# Patient Record
Sex: FEMALE | Race: BLACK OR AFRICAN AMERICAN | NOT HISPANIC OR LATINO | Employment: OTHER | URBAN - METROPOLITAN AREA
[De-identification: names, ages, dates, MRNs, and addresses within clinical notes are randomized per-mention and may not be internally consistent; named-entity substitution may affect disease eponyms.]

---

## 2017-11-13 DIAGNOSIS — R94.5 LIVER FUNCTION STUDY, ABNORMAL: Primary | ICD-10-CM

## 2017-11-13 DIAGNOSIS — E78.5 HYPERLIPIDEMIA: ICD-10-CM

## 2017-11-13 DIAGNOSIS — E11.9 DM (DIABETES MELLITUS): ICD-10-CM

## 2017-11-21 ENCOUNTER — HOSPITAL ENCOUNTER (OUTPATIENT)
Dept: RADIOLOGY | Facility: OTHER | Age: 52
Discharge: HOME | End: 2017-11-21
Attending: INTERNAL MEDICINE
Payer: COMMERCIAL

## 2017-11-21 DIAGNOSIS — E78.5 HYPERLIPIDEMIA: ICD-10-CM

## 2017-11-21 DIAGNOSIS — R94.5 LIVER FUNCTION STUDY, ABNORMAL: ICD-10-CM

## 2017-11-21 DIAGNOSIS — E11.9 DM (DIABETES MELLITUS): ICD-10-CM

## 2017-11-21 PROCEDURE — 76700 US EXAM ABDOM COMPLETE: CPT | Mod: 26,,, | Performed by: RADIOLOGY

## 2017-11-21 PROCEDURE — 76700 US EXAM ABDOM COMPLETE: CPT | Mod: TC

## 2017-12-16 ENCOUNTER — OFFICE VISIT (OUTPATIENT)
Dept: URGENT CARE | Facility: CLINIC | Age: 52
End: 2017-12-16
Payer: COMMERCIAL

## 2017-12-16 VITALS
DIASTOLIC BLOOD PRESSURE: 86 MMHG | SYSTOLIC BLOOD PRESSURE: 137 MMHG | RESPIRATION RATE: 16 BRPM | TEMPERATURE: 98 F | WEIGHT: 144 LBS | HEART RATE: 88 BPM | OXYGEN SATURATION: 98 % | BODY MASS INDEX: 21.33 KG/M2 | HEIGHT: 69 IN

## 2017-12-16 DIAGNOSIS — R51.9 ACUTE NONINTRACTABLE HEADACHE, UNSPECIFIED HEADACHE TYPE: Primary | ICD-10-CM

## 2017-12-16 DIAGNOSIS — J02.9 PHARYNGITIS, UNSPECIFIED ETIOLOGY: ICD-10-CM

## 2017-12-16 DIAGNOSIS — H57.89 REDNESS OF EYE, RIGHT: ICD-10-CM

## 2017-12-16 PROCEDURE — 96372 THER/PROPH/DIAG INJ SC/IM: CPT | Mod: S$GLB,,, | Performed by: EMERGENCY MEDICINE

## 2017-12-16 PROCEDURE — 99203 OFFICE O/P NEW LOW 30 MIN: CPT | Mod: 25,S$GLB,, | Performed by: EMERGENCY MEDICINE

## 2017-12-16 RX ORDER — KETOROLAC TROMETHAMINE 30 MG/ML
30 INJECTION, SOLUTION INTRAMUSCULAR; INTRAVENOUS
Status: COMPLETED | OUTPATIENT
Start: 2017-12-16 | End: 2017-12-16

## 2017-12-16 RX ORDER — ATORVASTATIN CALCIUM 40 MG/1
40 TABLET, FILM COATED ORAL DAILY
COMMUNITY
End: 2019-10-31 | Stop reason: CLARIF

## 2017-12-16 RX ORDER — NORETHINDRONE ACETATE AND ETHINYL ESTRADIOL 1.5-30(21)
1 KIT ORAL DAILY
COMMUNITY
End: 2022-04-07

## 2017-12-16 RX ORDER — METFORMIN HYDROCHLORIDE 500 MG/1
500 TABLET ORAL DAILY
COMMUNITY
End: 2021-02-17 | Stop reason: ALTCHOICE

## 2017-12-16 RX ORDER — BUTALBITAL, ACETAMINOPHEN AND CAFFEINE 50; 325; 40 MG/1; MG/1; MG/1
1 TABLET ORAL EVERY 4 HOURS PRN
COMMUNITY
End: 2019-10-31 | Stop reason: CLARIF

## 2017-12-16 RX ORDER — BUTALBITAL, ACETAMINOPHEN AND CAFFEINE 50; 325; 40 MG/1; MG/1; MG/1
1 TABLET ORAL EVERY 4 HOURS PRN
Qty: 20 TABLET | Refills: 0 | Status: SHIPPED | OUTPATIENT
Start: 2017-12-16 | End: 2018-01-15

## 2017-12-16 RX ORDER — CEFDINIR 300 MG/1
300 CAPSULE ORAL EVERY 12 HOURS
Qty: 14 CAPSULE | Refills: 0 | Status: SHIPPED | OUTPATIENT
Start: 2017-12-16 | End: 2017-12-23

## 2017-12-16 RX ORDER — CIPROFLOXACIN HYDROCHLORIDE 3 MG/ML
2 SOLUTION/ DROPS OPHTHALMIC EVERY 4 HOURS
Qty: 10 ML | Refills: 0 | Status: SHIPPED | OUTPATIENT
Start: 2017-12-16 | End: 2017-12-23

## 2017-12-16 RX ADMIN — KETOROLAC TROMETHAMINE 30 MG: 30 INJECTION, SOLUTION INTRAMUSCULAR; INTRAVENOUS at 04:12

## 2017-12-16 NOTE — PROGRESS NOTES
Subjective:       Patient ID: Teresa Trujillo is a 52 y.o. female.    Vitals:    12/16/17 1541   BP: 137/86   Pulse: 88   Resp: 16   Temp: 98.2 °F (36.8 °C)       Chief Complaint: Eye Problem    Pt states right eye redness and irritation and headache x 2 days. Reports sore throat, sinus congestion, head congestion and headache, as well as right sided eye redness, with light sensitivity (that does resolve with the alcaine drops). No hx of glaucoma, no hx of migraines, does occasionally wear contact but none recently.      Eye Problem    The right eye is affected. This is a new problem. The current episode started yesterday. The problem occurs constantly. The problem has been gradually worsening. The patient is experiencing no pain. Associated symptoms include an eye discharge (clear drainage), eye redness and photophobia. Pertinent negatives include no blurred vision, double vision, fever, nausea or vomiting. She has tried eye drops for the symptoms. The treatment provided mild relief.     Review of Systems   Constitution: Negative for chills and fever.   HENT: Positive for congestion, odynophagia and sore throat.    Eyes: Positive for discharge (clear drainage), photophobia, redness and visual disturbance (mild blurriness from the drainage and lightsensitivity. able to fully open after alcaiune with resolution of her synmptoms). Negative for blurred vision, double vision, pain and vision loss in right eye.   Musculoskeletal: Negative for joint pain, joint swelling, myalgias and neck pain.   Gastrointestinal: Negative for abdominal pain, nausea and vomiting.   Genitourinary: Negative for dysuria and hematuria.   Neurological: Positive for headaches. Negative for brief paralysis, difficulty with concentration, dizziness, focal weakness, loss of balance, numbness and sensory change.   Psychiatric/Behavioral: Negative for altered mental status.       Objective:      Physical Exam   Constitutional: She is oriented to  person, place, and time. She appears well-developed and well-nourished. No distress.   HENT:   Head: Normocephalic and atraumatic.   Right Ear: Hearing, external ear and ear canal normal. No drainage or tenderness. Tympanic membrane is not injected, not erythematous, not retracted and not bulging. No middle ear effusion.   Left Ear: Hearing, external ear and ear canal normal. No drainage or tenderness. Tympanic membrane is not injected, not erythematous, not retracted and not bulging.  No middle ear effusion.   Nose: Nose normal. No mucosal edema or rhinorrhea. Right sinus exhibits no maxillary sinus tenderness and no frontal sinus tenderness. Left sinus exhibits no maxillary sinus tenderness and no frontal sinus tenderness.   Mouth/Throat: Mucous membranes are normal. No dental abscesses or uvula swelling. No oropharyngeal exudate, posterior oropharyngeal edema or posterior oropharyngeal erythema. No tonsillar exudate.   Posterior op erythema  Sinus ttp of the maxillary sinuses bilaterally and the right frontal sinuses  No pain on palpation of the globe, periorbital rim nor periorbital region  Fluorescein exam shows no foreign bodies, shows no corneal abrasion  See MDM    Eyes: Conjunctivae and EOM are normal. Pupils are equal, round, and reactive to light. Right eye exhibits no discharge. Left eye exhibits no discharge.   Cardiovascular: Normal rate and regular rhythm.  Exam reveals no gallop and no friction rub.    No murmur heard.  Pulmonary/Chest: Breath sounds normal. No respiratory distress. She has no wheezes. She has no rales. She exhibits no tenderness.   Abdominal: Bowel sounds are normal. There is no tenderness.   Lymphadenopathy:        Head (right side): No submental adenopathy present.        Head (left side): No submental adenopathy present.     She has no cervical adenopathy.        Right cervical: No superficial cervical and no posterior cervical adenopathy present.       Left cervical: No  superficial cervical and no posterior cervical adenopathy present.   Neurological: She is alert and oriented to person, place, and time.   Skin: Skin is warm and dry. No rash noted.   Nursing note and vitals reviewed.      With painful red eye. With symptoms of pharyngitis, sinus infection and resolution of symptoms with alcaine drops, lack of corneal abrasion and fb, ddx includes viral vs bacterial conjunctivity, but also includes increased intra-ocular pressures. She does not have a cloudy cornea,eomi, and visual acuity normal and intact without deficits after alcain drops placed, leading towards superficial corneal/conjunctival irritation rather than increased iop or acute angle closure glaucome, but explained to the patientm her daughter, and her  that if she has worse headache, return of eye pain or worse eye pain, or weakness in the arms or legs, change or loss of vision or failure to improve in 24-36 hours that she would need to go direclt yot he er for slit lmp and specifically iop mneasurments. She understands this and voiced this to me and will see her eye doctor on Monday.  Assessment:       1. Acute nonintractable headache, unspecified headache type    2. Redness of eye, right    3. Pharyngitis, unspecified etiology        Plan:       Teresa was seen today for eye problem.    Diagnoses and all orders for this visit:    Acute nonintractable headache, unspecified headache type    Redness of eye, right    Pharyngitis, unspecified etiology    Other orders  -     ketorolac injection 30 mg; Inject 30 mg into the muscle one time.  -     butalbital-acetaminophen-caffeine -40 mg (FIORICET, ESGIC) -40 mg per tablet; Take 1 tablet by mouth every 4 (four) hours as needed for Pain.  -     ciprofloxacin HCl (CILOXAN) 0.3 % ophthalmic solution; Place 2 drops into the right eye every 4 (four) hours.  -     cefdinir (OMNICEF) 300 MG capsule; Take 1 capsule (300 mg total) by mouth every 12 (twelve)  "hours.          Patient Instructions   AS DISCUSSED IN LENGTH THE ONLY WAS TO RULE OUT ACUTE GLAUCOME IS TO HAVE INTRA-OCULAR PRESSURES READ. WE ARE UNABLE TO DO THIS IN THE CLINIC AT THIS TIME, AND THIS WOULD BE RULE OUT AND DONE IN THE EMERGENCY ROOM OR OPHTHALMOLOGY OFFICE.    IF NOT MUCH IMPROVED AND CERTAINLY IF WORSE DESPITE TREATMENT, PLEASE GO DIRECTLY TO THE EMERGENCY ROOM FOR FURTHER WORKUP AND TREATMENT AND RULING OUT GLAUCOMA. IF YOU HAVE WORSE HEADACHE, LOSS OF VISION, WORSE EYE PAIN, OR WORSENING OF CONDITION.    OTHERWISE, FOLLOW UP WITH OPHTHALMOLOGIST ON Monday FOR FULL EXAM AND PRESSURE CHECK.    30 MG TORADOL GIVEN IN CLINIC  FIORICET RX FOR HEADACHE  CILOXAN DROPS RX FOR THE RIGHT EYE  Headache, Unspecified    A number of things can cause headaches. The cause of your headache isnt clear. But it doesnt seem to be a sign of any serious illness.  You could have a tension headache or a migraine headache.  Stress can cause a tension headache. This can happen if you tense the muscles of your shoulders, neck, and scalp without knowing it. If this stress lasts long enough, you may develop a tension headache.  It is not clear why migraines occur, but certain things called" triggers" can raise the risk of having a migraine attack. Migraine triggers may include emotional stress or depression, or by hormone changes during the menstrual cycle. Other triggers include birth control pills and other medicines, alcohol or caffeine, foods with tyramine (such as aged cheese, wine), eyestrain, weather changes, missed meals, and lack of sleep or oversleeping.  Other causes of headache include:  · Viral illness with high fever  · Head injury with concussion  · Sinus, ear, or throat infection  · Dental pain and jaw joint (TMJ) pain  More serious but less common causes of headache include stroke, brain hemorrhage, brain tumor, meningitis, and encephalitis.  Home care  Follow these tips when taking care of yourself at " home:  · Dont drive yourself home if you were given pain medicine for your headache. Instead, have someone else drive you home. Try to sleep when you get home. You should feel much better when you wake up.  · Apply heat to the back of your neck to ease a neck muscle spasm. Take care of a migraine headache by putting an ice pack on your forehead or at the base of your skull.  · If you have nausea or vomiting, eat a light diet until your headache eases.  · If you have a migraine headache, use sunglasses when in the daylight or around bright indoor lighting until your symptoms get better. Bright glaring light can make this type of headache worse.  Follow-up care  Follow up with your healthcare provider, or as advised. Talk with your provider if you have frequent headaches. He or she can help figure out a treatment plan. By knowing the earliest signs of headache, and starting treatment right away, you may be able to stop the pain yourself.  When to seek medical advice  Call your healthcare provider right away if any of these occur:  · Your head pain suddenly gets worse after sexual intercourse or strenuous activity  · Your head pain doesnt get better within 24 hours  · You arent able to keep liquids down (repeated vomiting)  · Fever of 100.4ºF (38ºC) or higher, or as directed by your healthcare provider  · Stiff neck  · Extreme drowsiness, confusion, or fainting  · Dizziness or dizziness with spinning sensation (vertigo)  · Weakness in an arm or leg or one side of your face  · You have trouble talking or seeing  Date Last Reviewed: 8/1/2016 © 2000-2017 [employer]. [address] [place] [address], [place] [address]. All rights reserved. This information is not intended as a substitute for professional medical care. Always follow your healthcare professional's instructions.        Understanding Red Eye: Causes    Do your eyes sometimes get red and irritated? This could be a sign of irritation or infection. The  inside of your eyelid and the white of your eye are covered with a membrane called the conjunctiva. When your eye is irritated or infected, the blood vessels in this membrane swell. This condition is called conjunctivitis. It is also known as red eye or pink eye.  Irritation  Allergies and environmental irritants are common causes of inflamed eyes. Other causes can include:  · Injuries  · Objects in the eye  · Some eye drops  · Makeup  · Contact lenses  · Diseases inside the eye  Infection  Viruses and bacteria can cause eye infections. An infection may begin in your eye. It can also start somewhere else in your body, such as your nose or throat. Sexually transmitted diseases can also cause eye infections.  Date Last Reviewed: 8/9/2015  © 4983-1365 [employer]. [address] [place] [address], [place] [address]. All rights reserved. This information is not intended as a substitute for professional medical care. Always follow your healthcare professional's instructions.

## 2017-12-16 NOTE — PATIENT INSTRUCTIONS
"AS DISCUSSED IN LENGTH THE ONLY WAS TO RULE OUT ACUTE GLAUCOME IS TO HAVE INTRA-OCULAR PRESSURES READ. WE ARE UNABLE TO DO THIS IN THE CLINIC AT THIS TIME, AND THIS WOULD BE RULE OUT AND DONE IN THE EMERGENCY ROOM OR OPHTHALMOLOGY OFFICE.    IF NOT MUCH IMPROVED AND CERTAINLY IF WORSE DESPITE TREATMENT, PLEASE GO DIRECTLY TO THE EMERGENCY ROOM FOR FURTHER WORKUP AND TREATMENT AND RULING OUT GLAUCOMA. IF YOU HAVE WORSE HEADACHE, LOSS OF VISION, WORSE EYE PAIN, OR WORSENING OF CONDITION.    OTHERWISE, FOLLOW UP WITH OPHTHALMOLOGIST ON Monday FOR FULL EXAM AND PRESSURE CHECK.    30 MG TORADOL GIVEN IN CLINIC  FIORICET RX FOR HEADACHE  CILOXAN DROPS RX FOR THE RIGHT EYE  Headache, Unspecified    A number of things can cause headaches. The cause of your headache isnt clear. But it doesnt seem to be a sign of any serious illness.  You could have a tension headache or a migraine headache.  Stress can cause a tension headache. This can happen if you tense the muscles of your shoulders, neck, and scalp without knowing it. If this stress lasts long enough, you may develop a tension headache.  It is not clear why migraines occur, but certain things called" triggers" can raise the risk of having a migraine attack. Migraine triggers may include emotional stress or depression, or by hormone changes during the menstrual cycle. Other triggers include birth control pills and other medicines, alcohol or caffeine, foods with tyramine (such as aged cheese, wine), eyestrain, weather changes, missed meals, and lack of sleep or oversleeping.  Other causes of headache include:  · Viral illness with high fever  · Head injury with concussion  · Sinus, ear, or throat infection  · Dental pain and jaw joint (TMJ) pain  More serious but less common causes of headache include stroke, brain hemorrhage, brain tumor, meningitis, and encephalitis.  Home care  Follow these tips when taking care of yourself at home:  · Dont drive yourself home if " you were given pain medicine for your headache. Instead, have someone else drive you home. Try to sleep when you get home. You should feel much better when you wake up.  · Apply heat to the back of your neck to ease a neck muscle spasm. Take care of a migraine headache by putting an ice pack on your forehead or at the base of your skull.  · If you have nausea or vomiting, eat a light diet until your headache eases.  · If you have a migraine headache, use sunglasses when in the daylight or around bright indoor lighting until your symptoms get better. Bright glaring light can make this type of headache worse.  Follow-up care  Follow up with your healthcare provider, or as advised. Talk with your provider if you have frequent headaches. He or she can help figure out a treatment plan. By knowing the earliest signs of headache, and starting treatment right away, you may be able to stop the pain yourself.  When to seek medical advice  Call your healthcare provider right away if any of these occur:  · Your head pain suddenly gets worse after sexual intercourse or strenuous activity  · Your head pain doesnt get better within 24 hours  · You arent able to keep liquids down (repeated vomiting)  · Fever of 100.4ºF (38ºC) or higher, or as directed by your healthcare provider  · Stiff neck  · Extreme drowsiness, confusion, or fainting  · Dizziness or dizziness with spinning sensation (vertigo)  · Weakness in an arm or leg or one side of your face  · You have trouble talking or seeing  Date Last Reviewed: 8/1/2016  © 9522-4381 [employer]. [address] [place] [address], [place] [address]. All rights reserved. This information is not intended as a substitute for professional medical care. Always follow your healthcare professional's instructions.        Understanding Red Eye: Causes    Do your eyes sometimes get red and irritated? This could be a sign of irritation or infection. The inside of your eyelid and the white of  your eye are covered with a membrane called the conjunctiva. When your eye is irritated or infected, the blood vessels in this membrane swell. This condition is called conjunctivitis. It is also known as red eye or pink eye.  Irritation  Allergies and environmental irritants are common causes of inflamed eyes. Other causes can include:  · Injuries  · Objects in the eye  · Some eye drops  · Makeup  · Contact lenses  · Diseases inside the eye  Infection  Viruses and bacteria can cause eye infections. An infection may begin in your eye. It can also start somewhere else in your body, such as your nose or throat. Sexually transmitted diseases can also cause eye infections.  Date Last Reviewed: 8/9/2015  © 4879-2569 [employer]. [address] [place] [address], [place] [address]. All rights reserved. This information is not intended as a substitute for professional medical care. Always follow your healthcare professional's instructions.

## 2018-02-02 ENCOUNTER — TELEPHONE (OUTPATIENT)
Dept: TRANSPLANT | Facility: CLINIC | Age: 53
End: 2018-02-02

## 2018-03-24 ENCOUNTER — OFFICE VISIT (OUTPATIENT)
Dept: URGENT CARE | Facility: CLINIC | Age: 53
End: 2018-03-24
Payer: COMMERCIAL

## 2018-03-24 VITALS
OXYGEN SATURATION: 99 % | TEMPERATURE: 98 F | RESPIRATION RATE: 18 BRPM | SYSTOLIC BLOOD PRESSURE: 156 MMHG | HEART RATE: 83 BPM | BODY MASS INDEX: 21.03 KG/M2 | WEIGHT: 142 LBS | DIASTOLIC BLOOD PRESSURE: 87 MMHG | HEIGHT: 69 IN

## 2018-03-24 DIAGNOSIS — R05.9 COUGH: ICD-10-CM

## 2018-03-24 DIAGNOSIS — J40 BRONCHITIS: Primary | ICD-10-CM

## 2018-03-24 PROCEDURE — 96372 THER/PROPH/DIAG INJ SC/IM: CPT | Mod: S$GLB,,, | Performed by: FAMILY MEDICINE

## 2018-03-24 PROCEDURE — 99214 OFFICE O/P EST MOD 30 MIN: CPT | Mod: 25,S$GLB,, | Performed by: FAMILY MEDICINE

## 2018-03-24 RX ORDER — AZITHROMYCIN 250 MG/1
TABLET, FILM COATED ORAL
Qty: 6 TABLET | Refills: 0 | Status: SHIPPED | OUTPATIENT
Start: 2018-03-24 | End: 2019-10-31 | Stop reason: CLARIF

## 2018-03-24 RX ORDER — BENZONATATE 200 MG/1
200 CAPSULE ORAL 3 TIMES DAILY PRN
Qty: 30 CAPSULE | Refills: 0 | Status: SHIPPED | OUTPATIENT
Start: 2018-03-24 | End: 2018-04-03

## 2018-03-24 RX ORDER — BETAMETHASONE SODIUM PHOSPHATE AND BETAMETHASONE ACETATE 3; 3 MG/ML; MG/ML
9 INJECTION, SUSPENSION INTRA-ARTICULAR; INTRALESIONAL; INTRAMUSCULAR; SOFT TISSUE
Status: COMPLETED | OUTPATIENT
Start: 2018-03-24 | End: 2018-03-24

## 2018-03-24 RX ADMIN — BETAMETHASONE SODIUM PHOSPHATE AND BETAMETHASONE ACETATE 9 MG: 3; 3 INJECTION, SUSPENSION INTRA-ARTICULAR; INTRALESIONAL; INTRAMUSCULAR; SOFT TISSUE at 01:03

## 2018-03-24 NOTE — PROGRESS NOTES
"Subjective:       Patient ID: Teresa Trujillo is a 53 y.o. female.    Vitals:  height is 5' 9" (1.753 m) and weight is 64.4 kg (142 lb). Her temperature is 98.3 °F (36.8 °C). Her blood pressure is 156/87 (abnormal) and her pulse is 83. Her respiration is 18 and oxygen saturation is 99%.     Chief Complaint: Sore Throat    Sinus congestion and coughing sore throat       Sore Throat    This is a new problem. The current episode started in the past 7 days. The problem has been gradually worsening. There has been no fever. The pain is at a severity of 5/10. The pain is mild. Associated symptoms include congestion and coughing. Pertinent negatives include no abdominal pain, ear pain, headaches, hoarse voice or shortness of breath. The treatment provided mild relief.     Review of Systems   Constitution: Negative for chills, fever and malaise/fatigue.   HENT: Positive for congestion and sore throat. Negative for ear pain and hoarse voice.    Eyes: Negative for discharge and redness.   Cardiovascular: Negative for chest pain, dyspnea on exertion and leg swelling.   Respiratory: Positive for cough. Negative for shortness of breath, sputum production and wheezing.    Musculoskeletal: Negative for myalgias.   Gastrointestinal: Negative for abdominal pain and nausea.   Neurological: Negative for headaches.       Objective:      Physical Exam   Constitutional: She is oriented to person, place, and time. She appears well-developed and well-nourished.   HENT:   Head: Normocephalic and atraumatic.   Right Ear: External ear normal.   Left Ear: External ear normal.   Mouth/Throat: Oropharynx is clear and moist.   Eyes: EOM are normal. Pupils are equal, round, and reactive to light.   Neck: Normal range of motion. Neck supple. No JVD present. No tracheal deviation present. No thyromegaly present.   Cardiovascular: Normal rate, regular rhythm and normal heart sounds.  Exam reveals no gallop and no friction rub.    No murmur " heard.  Pulmonary/Chest: Breath sounds normal. No respiratory distress. She has no wheezes. She has no rales. She exhibits no tenderness.   Abdominal: Soft. Bowel sounds are normal. She exhibits no distension and no mass. There is no tenderness. There is no rebound and no guarding. No hernia.   Musculoskeletal: Normal range of motion. She exhibits no edema, tenderness or deformity.   Lymphadenopathy:     She has no cervical adenopathy.   Neurological: She is alert and oriented to person, place, and time. She displays normal reflexes. No cranial nerve deficit. She exhibits normal muscle tone. Coordination normal.   Skin: Skin is warm. Capillary refill takes less than 2 seconds. No rash noted. No erythema. No pallor.   Psychiatric: She has a normal mood and affect. Her behavior is normal. Judgment and thought content normal.   Vitals reviewed.      Assessment:       1. Bronchitis    2. Cough        Plan:         Bronchitis  -     azithromycin (Z-NIKIA) 250 MG tablet; Take 2 tablets by mouth x 1 for day 1 Then take 1 tablet by mouth daily for day 2 - 5  Dispense: 6 tablet; Refill: 0  -     betamethasone acetate-betamethasone sodium phosphate injection 9 mg; Inject 1.5 mLs (9 mg total) into the muscle one time.    Cough  -     benzonatate (TESSALON) 200 MG capsule; Take 1 capsule (200 mg total) by mouth 3 (three) times daily as needed for Cough.  Dispense: 30 capsule; Refill: 0      Follow up with your doctor in a few days as needed.  Return to the urgent care or go to the ER if symptoms get worse.    Silverio Luna MD

## 2018-03-24 NOTE — PATIENT INSTRUCTIONS
What Is Acute Bronchitis?  Acute bronchitis is when the airways in your lungs (bronchial tubes) become red and swollen (inflamed). It is usually caused by a viral infection. But it can also occur because of a bacteria or allergen. Symptoms include a cough that produces yellow or greenish mucus and can last for days or sometimes weeks.  Inside healthy lungs    Air travels in and out of the lungs through the airways. The linings of these airways produce sticky mucus. This mucus traps particles that enter the lungs. Tiny structures called cilia then sweep the particles out of the airways.     Healthy airway: Airways are normally open. Air moves in and out easily.      Healthy cilia: Tiny, hairlike cilia sweep mucus and particles up and out of the airways.   Lungs with bronchitis  Bronchitis often occurs with a cold or the flu virus. The airways become inflamed (red and swollen). There is a deep hacking cough from the extra mucus. Other symptoms may include:  · Wheezing  · Chest discomfort  · Shortness of breath  · Mild fever  A second infection, this time due to bacteria, may then occur. And airways irritated by allergens or smoke are more likely to get infected.        Inflamed airway: Inflammation and extra mucus narrow the airway, causing shortness of breath.      Impaired cilia: Extra mucus impairs cilia, causing congestion and wheezing. Smoking makes the problem worse.   Making a diagnosis  A physical exam, health history, and certain tests help your healthcare provider make the diagnosis.  Health history  Your healthcare provider will ask you about your symptoms.  The exam  Your provider listens to your chest for signs of congestion. He or she may also check your ears, nose, and throat.  Possible tests  · A sputum test for bacteria. This requires a sample of mucus from your lungs.  · A nasal or throat swab. This tests to see if you have a bacterial infection.  · A chest X-ray. This is done if your healthcare  provider thinks you have pneumonia.  · Tests to check for an underlying condition. Other tests may be done to check for things such as allergies, asthma, or COPD (chronic obstructive pulmonary disease). You may need to see a specialist for more lung function testing.  Treating a cough  The main treatment for bronchitis is easing symptoms. Avoiding smoke, allergens, and other things that trigger coughing can often help. If the infection is bacterial, you may be given antibiotics. During the illness, it's important to get plenty of sleep. To ease symptoms:  · Dont smoke. Also avoid secondhand smoke.  · Use a humidifier. Or try breathing in steam from a hot shower. This may help loosen mucus.  · Drink a lot of water and juice. They can soothe the throat and may help thin mucus.  · Sit up or use extra pillows when in bed. This helps to lessen coughing and congestion.  · Ask your provider about using medicine. Ask about using cough medicine, pain and fever medicine, or a decongestant.  Antibiotics  Most cases of bronchitis are caused by cold or flu viruses. They dont need antibiotics to treat them, even if your mucus is thick and green or yellow. Antibiotics dont treat viral illness and antibiotics have not been shown to have any benefit in cases of acute bronchitis. Taking antibiotics when they are not needed increases your risk of getting an infection later that is antibiotic-resistant. Antibiotics can also cause severe cases of diarrhea that require other antibiotics to treat.  It is important that you accept your healthcare provider's opinion to not use antibiotics. Your provider will prescribe antibiotics if the infection is caused by bacteria. If they are prescribed:  · Take all of the medicine. Take the medicine until it is used up, even if symptoms have improved. If you dont, the bronchitis may come back.  · Take the medicines as directed. For instance, some medicines should be taken with food.  · Ask about  side effects. Ask your provider or pharmacist what side effects are common, and what to do about them.  Follow-up care  You should see your provider again in 2 to 3 weeks. By this time, symptoms should have improved. An infection that lasts longer may mean you have a more serious problem.  Prevention  · Avoid tobacco smoke. If you smoke, quit. Stay away from smoky places. Ask friends and family not to smoke around you, or in your home or car.  · Get checked for allergies.  · Ask your provider about getting a yearly flu shot. Also ask about pneumococcal or pneumonia shots.  · Wash your hands often. This helps reduce the chance of picking up viruses that cause colds and flu.  Call your healthcare provider if:  · Symptoms worsen, or you have new symptoms  · Breathing problems worsen or  become severe  · Symptoms dont get better within a week, or within 3 days of taking antibiotics   Date Last Reviewed: 2/1/2017  © 4202-2718 [employer]. [address] [place] [address]. All rights reserved. This information is not intended as a substitute for professional medical care. Always follow your healthcare professional's instructions.      Follow up with your doctor in a few days as needed.  Return to the urgent care or go to the ER if symptoms get worse.    Silverio Luna MD

## 2019-10-24 ENCOUNTER — OFFICE VISIT (OUTPATIENT)
Dept: URGENT CARE | Facility: CLINIC | Age: 54
End: 2019-10-24
Payer: COMMERCIAL

## 2019-10-24 VITALS
HEART RATE: 118 BPM | OXYGEN SATURATION: 98 % | TEMPERATURE: 101 F | DIASTOLIC BLOOD PRESSURE: 84 MMHG | WEIGHT: 142 LBS | RESPIRATION RATE: 16 BRPM | SYSTOLIC BLOOD PRESSURE: 153 MMHG | HEIGHT: 69 IN | BODY MASS INDEX: 21.03 KG/M2

## 2019-10-24 DIAGNOSIS — R50.9 FEVER, UNSPECIFIED FEVER CAUSE: Primary | ICD-10-CM

## 2019-10-24 DIAGNOSIS — R05.9 COUGH: ICD-10-CM

## 2019-10-24 DIAGNOSIS — J02.9 SORE THROAT: ICD-10-CM

## 2019-10-24 LAB
CTP QC/QA: YES
CTP QC/QA: YES
FLUAV AG NPH QL: NEGATIVE
FLUBV AG NPH QL: NEGATIVE
S PYO RRNA THROAT QL PROBE: NEGATIVE

## 2019-10-24 PROCEDURE — 99214 OFFICE O/P EST MOD 30 MIN: CPT | Mod: S$GLB,,, | Performed by: NURSE PRACTITIONER

## 2019-10-24 PROCEDURE — 3008F PR BODY MASS INDEX (BMI) DOCUMENTED: ICD-10-PCS | Mod: CPTII,S$GLB,, | Performed by: NURSE PRACTITIONER

## 2019-10-24 PROCEDURE — 87880 STREP A ASSAY W/OPTIC: CPT | Mod: QW,S$GLB,, | Performed by: NURSE PRACTITIONER

## 2019-10-24 PROCEDURE — 87804 POCT INFLUENZA A/B: ICD-10-PCS | Mod: 59,QW,S$GLB, | Performed by: NURSE PRACTITIONER

## 2019-10-24 PROCEDURE — 99214 PR OFFICE/OUTPT VISIT, EST, LEVL IV, 30-39 MIN: ICD-10-PCS | Mod: S$GLB,,, | Performed by: NURSE PRACTITIONER

## 2019-10-24 PROCEDURE — 3008F BODY MASS INDEX DOCD: CPT | Mod: CPTII,S$GLB,, | Performed by: NURSE PRACTITIONER

## 2019-10-24 PROCEDURE — 87880 POCT RAPID STREP A: ICD-10-PCS | Mod: QW,S$GLB,, | Performed by: NURSE PRACTITIONER

## 2019-10-24 PROCEDURE — 87804 INFLUENZA ASSAY W/OPTIC: CPT | Mod: 59,QW,S$GLB, | Performed by: NURSE PRACTITIONER

## 2019-10-24 RX ORDER — PROMETHAZINE HYDROCHLORIDE AND CODEINE PHOSPHATE 6.25; 1 MG/5ML; MG/5ML
5 SOLUTION ORAL NIGHTLY PRN
Qty: 30 ML | Refills: 0 | Status: SHIPPED | OUTPATIENT
Start: 2019-10-24 | End: 2019-10-31 | Stop reason: CLARIF

## 2019-10-24 RX ORDER — ACETAMINOPHEN 500 MG
1000 TABLET ORAL
Status: COMPLETED | OUTPATIENT
Start: 2019-10-24 | End: 2019-10-24

## 2019-10-24 RX ORDER — BENZONATATE 100 MG/1
200 CAPSULE ORAL 3 TIMES DAILY PRN
Qty: 30 CAPSULE | Refills: 0 | Status: SHIPPED | OUTPATIENT
Start: 2019-10-24 | End: 2019-10-31 | Stop reason: CLARIF

## 2019-10-24 RX ADMIN — Medication 1000 MG: at 02:10

## 2019-10-24 NOTE — PROGRESS NOTES
"Subjective:       Patient ID: Teresa Trujillo is a 54 y.o. female.    Vitals:    10/24/19 1337   BP: (!) 153/84   Pulse: (!) 118   Resp: 16   Temp: (!) 101.3 °F (38.5 °C)   SpO2: 98%   Weight: 64.4 kg (142 lb)   Height: 5' 9" (1.753 m)       Chief Complaint: Sore Throat    Pt states sore throat with cough, fever, chills, and body aches x 4 days. Pt states symptoms have intensified over the past 2 days.  Pt had motrin around 10:00 am yesterday. Denies sick contacts. No n/v/d. Tolerating PO intake without difficulty. No resp distress.    Sore Throat    This is a new problem. The current episode started in the past 7 days. The problem has been gradually worsening. There has been no fever. Associated symptoms include coughing, headaches and a hoarse voice. Pertinent negatives include no abdominal pain, diarrhea, shortness of breath or vomiting. Treatments tried: dayquil, mucinex, ibuprofen. The treatment provided mild relief.     Review of Systems   Constitution: Positive for chills and fever.   HENT: Positive for hoarse voice, odynophagia and sore throat.    Eyes: Negative for blurred vision.   Cardiovascular: Negative for chest pain.   Respiratory: Positive for cough and sputum production (intermittent). Negative for shortness of breath.    Skin: Negative for rash.   Musculoskeletal: Positive for myalgias. Negative for back pain and joint pain.   Gastrointestinal: Negative for abdominal pain, diarrhea, nausea and vomiting.   Neurological: Positive for headaches.   Psychiatric/Behavioral: The patient is not nervous/anxious.    All other systems reviewed and are negative.      Objective:      Physical Exam   Constitutional: She is oriented to person, place, and time. She appears well-developed and well-nourished. She is cooperative.  Non-toxic appearance. She does not appear ill. No distress.   HENT:   Head: Normocephalic and atraumatic.   Right Ear: Hearing, tympanic membrane, external ear and ear canal normal. "   Left Ear: Hearing, tympanic membrane, external ear and ear canal normal.   Nose: Mucosal edema (boggy turbinates) present. No rhinorrhea or nasal deformity. No epistaxis. Right sinus exhibits no maxillary sinus tenderness and no frontal sinus tenderness. Left sinus exhibits no maxillary sinus tenderness and no frontal sinus tenderness.   Mouth/Throat: Uvula is midline and mucous membranes are normal. No trismus in the jaw. Normal dentition. No uvula swelling. Posterior oropharyngeal erythema (cobblestoning) and tonsillar abscesses present. No oropharyngeal exudate or posterior oropharyngeal edema.   Eyes: Pupils are equal, round, and reactive to light. Conjunctivae, EOM and lids are normal. No scleral icterus.   Sclera clear bilat   Neck: Trachea normal, normal range of motion, full passive range of motion without pain and phonation normal. Neck supple.   Cardiovascular: Normal rate, regular rhythm, normal heart sounds, intact distal pulses and normal pulses.   Pulmonary/Chest: Effort normal and breath sounds normal. No respiratory distress. She has no wheezes. She has no rales.   Abdominal: Soft. Normal appearance and bowel sounds are normal. She exhibits no distension. There is no tenderness.   Musculoskeletal: Normal range of motion. She exhibits no edema or deformity.   Lymphadenopathy:     She has no cervical adenopathy.   Neurological: She is alert and oriented to person, place, and time. She exhibits normal muscle tone. Coordination normal.   Skin: Skin is warm, dry and intact. No rash noted. She is not diaphoretic. No pallor.   Psychiatric: She has a normal mood and affect. Her speech is normal and behavior is normal. Judgment and thought content normal. Cognition and memory are normal.   Nursing note and vitals reviewed.      Assessment:       1. Fever, unspecified fever cause    2. Sore throat    3. Cough        Plan:       Teresa was seen today for sore throat.    Diagnoses and all orders for this  visit:    Fever, unspecified fever cause  -     POCT Influenza A/B  -     acetaminophen tablet 1,000 mg    Sore throat  -     POCT rapid strep A    Cough  -     benzonatate (TESSALON PERLES) 100 MG capsule; Take 2 capsules (200 mg total) by mouth 3 (three) times daily as needed for Cough.  -     promethazine-codeine 6.25-10 mg/5 ml (PHENERGAN WITH CODEINE) 6.25-10 mg/5 mL syrup; Take 5 mLs by mouth nightly as needed for Cough.      Results for orders placed or performed in visit on 10/24/19   POCT Influenza A/B   Result Value Ref Range    Rapid Influenza A Ag Negative Negative    Rapid Influenza B Ag Negative Negative     Acceptable Yes    POCT rapid strep A   Result Value Ref Range    Rapid Strep A Screen Negative Negative     Acceptable Yes      Patient Instructions   Please drink plenty of fluids.  Please get plenty of rest.  Please return here or go to the Emergency Department for any concerns or worsening of condition.  If you were prescribed a narcotic medication, do not drive or operate heavy equipment or machinery while taking these medications.  If you do not have Hypertension or any history of palpitations, it is ok to take over the counter Sudafed or Mucinex D or Allegra-D or Claritin-D or Zyrtec-D.  If you do take one of the above, it is ok to combine that with plain over the counter Mucinex or Allegra or Claritin or Zyrtec.  If for example you are taking Zyrtec -D, you can combine that with Mucinex, but not Mucinex-D.  If you are taking Mucinex-D, you can combine that with plain Allegra or Claritin or Zyrtec.   If you do have Hypertension or palpitations, it is safe to take Coricidin HBP for relief of sinus symptoms.  If not allergic, please take over the counter Tylenol (Acetaminophen) and/or Motrin (Ibuprofen) as directed for control of pain and/or fever.  Please follow up with your primary care doctor or specialist as needed.    If you  smoke, please stop  smoking.      Viral Upper Respiratory Illness (Adult)  You have a viral upper respiratory illness (URI), which is another term for the common cold. This illness is contagious during the first few days. It is spread through the air by coughing and sneezing. It may also be spread by direct contact (touching the sick person and then touching your own eyes, nose, or mouth). Frequent handwashing will decrease risk of spread. Most viral illnesses go away within 7 to 10 days with rest and simple home remedies. Sometimes the illness may last for several weeks. Antibiotics will not kill a virus, and they are generally not prescribed for this condition.    Home care  · If symptoms are severe, rest at home for the first 2 to 3 days. When you resume activity, don't let yourself get too tired.  · Avoid being exposed to cigarette smoke (yours or others).  · You may use acetaminophen or ibuprofen to control pain and fever, unless another medicine was prescribed. (Note: If you have chronic liver or kidney disease, have ever had a stomach ulcer or gastrointestinal bleeding, or are taking blood-thinning medicines, talk with your healthcare provider before using these medicines.) Aspirin should never be given to anyone under 18 years of age who is ill with a viral infection or fever. It may cause severe liver or brain damage.  · Your appetite may be poor, so a light diet is fine. Avoid dehydration by drinking 6 to 8 glasses of fluids per day (water, soft drinks, juices, tea, or soup). Extra fluids will help loosen secretions in the nose and lungs.  · Over-the-counter cold medicines will not shorten the length of time youre sick, but they may be helpful for the following symptoms: cough, sore throat, and nasal and sinus congestion. (Note: Do not use decongestants if you have high blood pressure.)  Follow-up care  Follow up with your healthcare provider, or as advised.  When to seek medical advice  Call your healthcare provider right  away if any of these occur:  · Cough with lots of colored sputum (mucus)  · Severe headache; face, neck, or ear pain  · Difficulty swallowing due to throat pain  · Fever of 100.4°F (38°C)  Call 911, or get immediate medical care  Call emergency services right away if any of these occur:  · Chest pain, shortness of breath, wheezing, or difficulty breathing  · Coughing up blood  · Inability to swallow due to throat pain  Date Last Reviewed: 9/13/2015 © 2000-2017 [employer]. [address] [place] [id number]. All rights reserved. This information is not intended as a substitute for professional medical care. Always follow your healthcare professional's instructions.

## 2019-10-24 NOTE — PATIENT INSTRUCTIONS
Please drink plenty of fluids.  Please get plenty of rest.  Please return here or go to the Emergency Department for any concerns or worsening of condition.  If you were prescribed a narcotic medication, do not drive or operate heavy equipment or machinery while taking these medications.  If you do not have Hypertension or any history of palpitations, it is ok to take over the counter Sudafed or Mucinex D or Allegra-D or Claritin-D or Zyrtec-D.  If you do take one of the above, it is ok to combine that with plain over the counter Mucinex or Allegra or Claritin or Zyrtec.  If for example you are taking Zyrtec -D, you can combine that with Mucinex, but not Mucinex-D.  If you are taking Mucinex-D, you can combine that with plain Allegra or Claritin or Zyrtec.   If you do have Hypertension or palpitations, it is safe to take Coricidin HBP for relief of sinus symptoms.  If not allergic, please take over the counter Tylenol (Acetaminophen) and/or Motrin (Ibuprofen) as directed for control of pain and/or fever.  Please follow up with your primary care doctor or specialist as needed.    If you  smoke, please stop smoking.      Viral Upper Respiratory Illness (Adult)  You have a viral upper respiratory illness (URI), which is another term for the common cold. This illness is contagious during the first few days. It is spread through the air by coughing and sneezing. It may also be spread by direct contact (touching the sick person and then touching your own eyes, nose, or mouth). Frequent handwashing will decrease risk of spread. Most viral illnesses go away within 7 to 10 days with rest and simple home remedies. Sometimes the illness may last for several weeks. Antibiotics will not kill a virus, and they are generally not prescribed for this condition.    Home care  · If symptoms are severe, rest at home for the first 2 to 3 days. When you resume activity, don't let yourself get too tired.  · Avoid being exposed to  cigarette smoke (yours or others).  · You may use acetaminophen or ibuprofen to control pain and fever, unless another medicine was prescribed. (Note: If you have chronic liver or kidney disease, have ever had a stomach ulcer or gastrointestinal bleeding, or are taking blood-thinning medicines, talk with your healthcare provider before using these medicines.) Aspirin should never be given to anyone under 18 years of age who is ill with a viral infection or fever. It may cause severe liver or brain damage.  · Your appetite may be poor, so a light diet is fine. Avoid dehydration by drinking 6 to 8 glasses of fluids per day (water, soft drinks, juices, tea, or soup). Extra fluids will help loosen secretions in the nose and lungs.  · Over-the-counter cold medicines will not shorten the length of time youre sick, but they may be helpful for the following symptoms: cough, sore throat, and nasal and sinus congestion. (Note: Do not use decongestants if you have high blood pressure.)  Follow-up care  Follow up with your healthcare provider, or as advised.  When to seek medical advice  Call your healthcare provider right away if any of these occur:  · Cough with lots of colored sputum (mucus)  · Severe headache; face, neck, or ear pain  · Difficulty swallowing due to throat pain  · Fever of 100.4°F (38°C)  Call 911, or get immediate medical care  Call emergency services right away if any of these occur:  · Chest pain, shortness of breath, wheezing, or difficulty breathing  · Coughing up blood  · Inability to swallow due to throat pain  Date Last Reviewed: 9/13/2015 © 2000-2017 [employer]. [address] [place] [address], [place] [address]. All rights reserved. This information is not intended as a substitute for professional medical care. Always follow your healthcare professional's instructions.

## 2019-10-28 NOTE — H&P (VIEW-ONLY)
History & Physical    SUBJECTIVE:     History of Present Illness:  Patient is a 54 y.o. female with history of epigastric, RUQ pain. No fever, chills, jaundice.  U/S shows gallstones.       Review of patient's allergies indicates:  No Known Allergies    Current Outpatient Medications   Medication Sig Dispense Refill    atorvastatin (LIPITOR) 40 MG tablet Take 40 mg by mouth once daily.      azithromycin (Z-NIKIA) 250 MG tablet Take 2 tablets by mouth x 1 for day 1 Then take 1 tablet by mouth daily for day 2 - 5 (Patient not taking: Reported on 10/24/2019) 6 tablet 0    benzonatate (TESSALON PERLES) 100 MG capsule Take 2 capsules (200 mg total) by mouth 3 (three) times daily as needed for Cough. 30 capsule 0    butalbital-acetaminophen-caffeine -40 mg (FIORICET, ESGIC) -40 mg per tablet Take 1 tablet by mouth every 4 (four) hours as needed for Pain.      metFORMIN (GLUCOPHAGE) 500 MG tablet Take 500 mg by mouth 2 (two) times daily with meals.      norethindrone-ethinyl estradiol-iron (MICROGESTIN FE1.5/30) 1.5 mg-30 mcg (21)/75 mg (7) tablet Take 1 tablet by mouth once daily.      promethazine-codeine 6.25-10 mg/5 ml (PHENERGAN WITH CODEINE) 6.25-10 mg/5 mL syrup Take 5 mLs by mouth nightly as needed for Cough. 30 mL 0     No current facility-administered medications for this visit.        Past Medical History:   Diagnosis Date    Diabetes mellitus, type 2     Hyperlipidemia      History reviewed. No pertinent surgical history.  History reviewed. No pertinent family history.  Social History     Tobacco Use    Smoking status: Never Smoker    Smokeless tobacco: Never Used   Substance Use Topics    Alcohol use: Yes     Comment: casually    Drug use: No        Review of Systems:  Review of Systems   HENT: Negative.    Respiratory: Negative.    Cardiovascular: Negative.    Gastrointestinal: Negative.    Genitourinary: Negative.    Musculoskeletal: Negative.    Skin: Negative.        OBJECTIVE:  "    Vital Signs (Most Recent)  Pulse: 88 (10/28/19 1006)  BP: 135/81 (10/28/19 1006)  5' 9" (1.753 m)  67.6 kg (149 lb)     Physical Exam:  Physical Exam   Constitutional: She is oriented to person, place, and time. She appears well-developed and well-nourished.   HENT:   Head: Normocephalic and atraumatic.   Eyes: EOM are normal. No scleral icterus.   Neck: Normal range of motion. Neck supple.   Cardiovascular: Normal rate, regular rhythm and normal heart sounds.   Pulmonary/Chest: Effort normal and breath sounds normal.   Abdominal: Soft. Bowel sounds are normal. She exhibits no distension and no mass. There is no tenderness. There is no guarding.   Musculoskeletal: Normal range of motion.   Neurological: She is alert and oriented to person, place, and time.   Skin: Skin is warm and dry.       Laboratory      Diagnostic Results:  US Abdomen Complete   Order: [id number]   Status:  Final result   Visible to patient:  No (Not Released) Next appt:  None Dx:  Hyperlipidemia; DM (diabetes mellitus...   Details     Reading Physician Reading Date Result Priority   Damir Trujillo MD 11/21/2017       Narrative     Comparison: None    Technique: Ultrasound abdomen complete.    Findings: Visualized portion of the pancreas, aorta and IVC demonstrate no significant abnormality.  There are multiple mobile stones within the gallbladder.  There is no gallbladder wall thickening or inflammatory changes.  There is no evidence of sonographic Martinez sign.  Common bile duct is normal in caliber measuring 0.3 cm.  Liver is enlarged measuring 20.2 cm and extending below the costal margin.  Liver has a homogeneous echotexture with no focal lesions.  Right kidney measures 10.3 cm and left kidney measures 10.8 cm.  No evidence of hydronephrosis.  There is no ascites.  Spleen is normal in size measuring 7.4 cm.      Impression         Cholelithiasis.  No sonographic evidence of acute cholecystitis.    Hepatomegaly.      Electronically signed " by: VIRGIL TABOR MD  Date: 11/21/17  Time: 09:40                ASSESSMENT/PLAN:     Cholelithiasis     PLAN:Plan     Darleen

## 2019-10-31 ENCOUNTER — ANESTHESIA EVENT (OUTPATIENT)
Dept: SURGERY | Facility: OTHER | Age: 54
End: 2019-10-31
Payer: COMMERCIAL

## 2019-10-31 ENCOUNTER — HOSPITAL ENCOUNTER (OUTPATIENT)
Dept: PREADMISSION TESTING | Facility: OTHER | Age: 54
Discharge: HOME | End: 2019-10-31
Attending: SPECIALIST
Payer: COMMERCIAL

## 2019-10-31 VITALS
BODY MASS INDEX: 21.92 KG/M2 | DIASTOLIC BLOOD PRESSURE: 82 MMHG | TEMPERATURE: 99 F | HEART RATE: 75 BPM | SYSTOLIC BLOOD PRESSURE: 129 MMHG | OXYGEN SATURATION: 99 % | HEIGHT: 69 IN | WEIGHT: 148 LBS

## 2019-10-31 DIAGNOSIS — Z01.818 PREOP TESTING: Primary | ICD-10-CM

## 2019-10-31 LAB
ALBUMIN SERPL BCP-MCNC: 3.2 G/DL (ref 3.5–5.2)
ALP SERPL-CCNC: 75 U/L (ref 55–135)
ALT SERPL W/O P-5'-P-CCNC: 15 U/L (ref 10–44)
ANION GAP SERPL CALC-SCNC: 10 MMOL/L (ref 8–16)
AST SERPL-CCNC: 19 U/L (ref 10–40)
BASOPHILS # BLD AUTO: 0.04 K/UL (ref 0–0.2)
BASOPHILS NFR BLD: 0.8 % (ref 0–1.9)
BILIRUB SERPL-MCNC: 0.4 MG/DL (ref 0.1–1)
BUN SERPL-MCNC: 4 MG/DL (ref 6–20)
CALCIUM SERPL-MCNC: 9.4 MG/DL (ref 8.7–10.5)
CHLORIDE SERPL-SCNC: 103 MMOL/L (ref 95–110)
CO2 SERPL-SCNC: 27 MMOL/L (ref 23–29)
CREAT SERPL-MCNC: 0.7 MG/DL (ref 0.5–1.4)
DIFFERENTIAL METHOD: ABNORMAL
EOSINOPHIL # BLD AUTO: 0.1 K/UL (ref 0–0.5)
EOSINOPHIL NFR BLD: 1 % (ref 0–8)
ERYTHROCYTE [DISTWIDTH] IN BLOOD BY AUTOMATED COUNT: 13.8 % (ref 11.5–14.5)
EST. GFR  (AFRICAN AMERICAN): >60 ML/MIN/1.73 M^2
EST. GFR  (NON AFRICAN AMERICAN): >60 ML/MIN/1.73 M^2
GLUCOSE SERPL-MCNC: 143 MG/DL (ref 70–110)
HCT VFR BLD AUTO: 32.4 % (ref 37–48.5)
HGB BLD-MCNC: 10.2 G/DL (ref 12–16)
IMM GRANULOCYTES # BLD AUTO: 0.03 K/UL (ref 0–0.04)
IMM GRANULOCYTES NFR BLD AUTO: 0.6 % (ref 0–0.5)
LYMPHOCYTES # BLD AUTO: 1.8 K/UL (ref 1–4.8)
LYMPHOCYTES NFR BLD: 35 % (ref 18–48)
MCH RBC QN AUTO: 29 PG (ref 27–31)
MCHC RBC AUTO-ENTMCNC: 31.5 G/DL (ref 32–36)
MCV RBC AUTO: 92 FL (ref 82–98)
MONOCYTES # BLD AUTO: 0.7 K/UL (ref 0.3–1)
MONOCYTES NFR BLD: 12.9 % (ref 4–15)
NEUTROPHILS # BLD AUTO: 2.5 K/UL (ref 1.8–7.7)
NEUTROPHILS NFR BLD: 49.7 % (ref 38–73)
NRBC BLD-RTO: 0 /100 WBC
PLATELET # BLD AUTO: 233 K/UL (ref 150–350)
PMV BLD AUTO: 11.7 FL (ref 9.2–12.9)
POTASSIUM SERPL-SCNC: 3.4 MMOL/L (ref 3.5–5.1)
PROT SERPL-MCNC: 7.6 G/DL (ref 6–8.4)
RBC # BLD AUTO: 3.52 M/UL (ref 4–5.4)
SODIUM SERPL-SCNC: 140 MMOL/L (ref 136–145)
WBC # BLD AUTO: 5.11 K/UL (ref 3.9–12.7)

## 2019-10-31 PROCEDURE — 85025 COMPLETE CBC W/AUTO DIFF WBC: CPT

## 2019-10-31 PROCEDURE — 80053 COMPREHEN METABOLIC PANEL: CPT

## 2019-10-31 PROCEDURE — 36415 COLL VENOUS BLD VENIPUNCTURE: CPT

## 2019-10-31 RX ORDER — SODIUM CHLORIDE, SODIUM LACTATE, POTASSIUM CHLORIDE, CALCIUM CHLORIDE 600; 310; 30; 20 MG/100ML; MG/100ML; MG/100ML; MG/100ML
INJECTION, SOLUTION INTRAVENOUS CONTINUOUS
Status: CANCELLED | OUTPATIENT
Start: 2019-10-31

## 2019-10-31 RX ORDER — CELECOXIB 200 MG/1
400 CAPSULE ORAL
Status: CANCELLED | OUTPATIENT
Start: 2019-10-31 | End: 2019-10-31

## 2019-10-31 RX ORDER — LIDOCAINE HYDROCHLORIDE 10 MG/ML
0.5 INJECTION, SOLUTION EPIDURAL; INFILTRATION; INTRACAUDAL; PERINEURAL ONCE
Status: CANCELLED | OUTPATIENT
Start: 2019-10-31 | End: 2019-10-31

## 2019-10-31 RX ORDER — PREGABALIN 75 MG/1
75 CAPSULE ORAL
Status: CANCELLED | OUTPATIENT
Start: 2019-10-31 | End: 2019-10-31

## 2019-10-31 RX ORDER — MIDAZOLAM HYDROCHLORIDE 1 MG/ML
2 INJECTION INTRAMUSCULAR; INTRAVENOUS
Status: CANCELLED | OUTPATIENT
Start: 2019-10-31 | End: 2019-10-31

## 2019-10-31 RX ORDER — ACETAMINOPHEN 500 MG
1000 TABLET ORAL
Status: CANCELLED | OUTPATIENT
Start: 2019-10-31 | End: 2019-10-31

## 2019-10-31 NOTE — ANESTHESIA PREPROCEDURE EVALUATION
10/31/2019  Teresa Trujillo is a 54 y.o., female.    Anesthesia Evaluation    I have reviewed the Patient Summary Reports.    I have reviewed the Nursing Notes.   I have reviewed the Medications.     Review of Systems  Anesthesia Hx:  No problems with previous Anesthesia  History of prior surgery of interest to airway management or planning: Denies Family Hx of Anesthesia complications.   Denies Personal Hx of Anesthesia complications.   Social:  Non-Smoker    Hematology/Oncology:     Oncology Normal     EENT/Dental:EENT/Dental Normal   Cardiovascular:   Exercise tolerance: good    Pulmonary:  Pulmonary Normal    Renal/:  Renal/ Normal     Hepatic/GI:  Hepatic/GI Normal    Musculoskeletal:  Musculoskeletal Normal    Neurological:  Neurology Normal    Endocrine:   Diabetes, type 2    Dermatological:  Skin Normal    Psych:  Psychiatric Normal           Physical Exam  General:  Well nourished    Airway/Jaw/Neck:  Airway Findings: Mouth Opening: Normal Tongue: Normal  General Airway Assessment: Adult  Mallampati: I      Dental:  Dental Findings: In tact        Mental Status:  Mental Status Findings:  Cooperative, Alert and Oriented         Anesthesia Plan  Type of Anesthesia, risks & benefits discussed:  Anesthesia Type:  general  Patient's Preference:   Intra-op Monitoring Plan:   Intra-op Monitoring Plan Comments:   Post Op Pain Control Plan: multimodal analgesia and per primary service following discharge from PACU  Post Op Pain Control Plan Comments:   Induction:   IV  Beta Blocker:         Informed Consent: Patient understands risks and agrees with Anesthesia plan.  Questions answered. Anesthesia consent signed with patient.  ASA Score: 2     Day of Surgery Review of History & Physical:    H&P update referred to the surgeon.     Anesthesia Plan Notes: Labs today        Ready For Surgery From  Anesthesia Perspective.

## 2019-10-31 NOTE — DISCHARGE INSTRUCTIONS
PRE-ADMIT TESTING -  [phone]    [address]  [place] [facility]          Your surgery has been scheduled at [facility]. We are pleased to have the opportunity to serve you. For Further Information please call [phone].    On the day of surgery please report to the Information Desk on the 1st floor.    · CONTACT YOUR PHYSICIAN'S OFFICE THE DAY PRIOR TO YOUR SURGERY TO OBTAIN YOUR ARRIVAL TIME.     · The evening before surgery do not eat anything after 9 p.m. ( this includes hard candy, chewing gum and mints).  You may only have GATORADE, POWERADE AND WATER  from 9 p.m. until you leave your home.   DO NOT DRINK ANY LIQUIDS ON THE WAY TO THE HOSPITAL.      SPECIAL MEDICATION INSTRUCTIONS: TAKE medications checked off by the Anesthesiologist on your Medication List.    Angiogram Patients: Take medications as instructed by your physician, including aspirin.     Surgery Patients:    If you take ASPIRIN - Your PHYSICIAN/SURGEON will need to inform you IF/OR when you need to stop taking aspirin prior to your surgery.     Do Not take any medications containing IBUPROFEN.  Do Not Wear any make-up or dark nail polish   (especially eye make-up) to surgery. If you come to surgery with makeup on you will be required to remove the makeup or nail polish.    Do not shave your surgical area at least 5 days prior to your surgery. The surgical prep will be performed at the hospital according to Infection Control regulations.    Leave all valuables at home.   Do Not wear any jewelry or watches, including any metal in body piercings. Jewelry must be removed prior to coming to the hospital.  There is a possibility that rings that are unable to be removed may be cut off if they are on the surgical extremity.    Contact Lens must be removed before surgery. Either do not wear the contact lens or bring a case and solution for storage.  Please bring a container for eyeglasses or dentures as required.  Bring  any paperwork your physician has provided, such as consent forms,  history and physicals, doctor's orders, etc.   Bring comfortable clothes that are loose fitting to wear upon discharge. Take into consideration the type of surgery being performed.  Maintain your diet as advised per your physician the day prior to surgery.      Adequate rest the night before surgery is advised.   Park in the Parking lot behind the hospital or in the [facility] across the street from the parking lot. Parking is complimentary.  If you will be discharged the same day as your procedure, please arrange for a responsible adult to drive you home or to accompany you if traveling by taxi.   YOU WILL NOT BE PERMITTED TO DRIVE OR TO LEAVE THE HOSPITAL ALONE AFTER SURGERY.   It is strongly recommended that you arrange for someone to remain with you for the first 24 hrs following your surgery.    The Surgeon will speak to your family/visitor after your surgery regarding the outcome of your surgery and post op care.  The Surgeon may speak to you after your surgery, but there is a possibility you may not remember the details.  Please check with your family members regarding the conversation with the Surgeon.    We strongly recommend whoever is bringing you home be present for discharge instructions.  This will ensure a thorough understanding for your post op home care.      Thank you for your cooperation.  The Staff of [facility].                Bathing Instructions with Hibiclens     Shower the evening before and morning of your procedure with Hibiclens:   Wash your face with water and your regular face wash/soap   Apply Hibiclens directly on your skin or on a wet washcloth and wash gently. When showering: Move away from the shower stream when applying Hibiclens to avoid rinsing off too soon.   Rinse thoroughly with warm water   Do not dilute Hibiclens         Dry off as usual, do not use any deodorant,  powder, body lotions, perfume, after shave or cologne.

## 2019-11-07 ENCOUNTER — HOSPITAL ENCOUNTER (OUTPATIENT)
Facility: OTHER | Age: 54
Discharge: HOME | End: 2019-11-07
Attending: SPECIALIST | Admitting: SPECIALIST
Payer: COMMERCIAL

## 2019-11-07 ENCOUNTER — ANESTHESIA (OUTPATIENT)
Dept: SURGERY | Facility: OTHER | Age: 54
End: 2019-11-07
Payer: COMMERCIAL

## 2019-11-07 VITALS
BODY MASS INDEX: 21.92 KG/M2 | TEMPERATURE: 99 F | RESPIRATION RATE: 16 BRPM | OXYGEN SATURATION: 95 % | WEIGHT: 148 LBS | DIASTOLIC BLOOD PRESSURE: 57 MMHG | SYSTOLIC BLOOD PRESSURE: 114 MMHG | HEIGHT: 69 IN | HEART RATE: 96 BPM

## 2019-11-07 DIAGNOSIS — K82.9 GALLBLADDER ATTACK: Primary | ICD-10-CM

## 2019-11-07 LAB
B-HCG UR QL: NEGATIVE
CTP QC/QA: YES
POCT GLUCOSE: 129 MG/DL (ref 70–110)

## 2019-11-07 PROCEDURE — 90471 IMMUNIZATION ADMIN: CPT | Performed by: SPECIALIST

## 2019-11-07 PROCEDURE — 63600175 PHARM REV CODE 636 W HCPCS: Performed by: ANESTHESIOLOGY

## 2019-11-07 PROCEDURE — 71000015 HC POSTOP RECOV 1ST HR: Performed by: SPECIALIST

## 2019-11-07 PROCEDURE — 88304 TISSUE EXAM BY PATHOLOGIST: CPT | Performed by: PATHOLOGY

## 2019-11-07 PROCEDURE — 36000708 HC OR TIME LEV III 1ST 15 MIN: Performed by: SPECIALIST

## 2019-11-07 PROCEDURE — 63600175 PHARM REV CODE 636 W HCPCS: Performed by: NURSE ANESTHETIST, CERTIFIED REGISTERED

## 2019-11-07 PROCEDURE — 36000709 HC OR TIME LEV III EA ADD 15 MIN: Performed by: SPECIALIST

## 2019-11-07 PROCEDURE — 71000039 HC RECOVERY, EACH ADD'L HOUR: Performed by: SPECIALIST

## 2019-11-07 PROCEDURE — 27201423 OPTIME MED/SURG SUP & DEVICES STERILE SUPPLY: Performed by: SPECIALIST

## 2019-11-07 PROCEDURE — 63600175 PHARM REV CODE 636 W HCPCS: Performed by: SPECIALIST

## 2019-11-07 PROCEDURE — 90686 IIV4 VACC NO PRSV 0.5 ML IM: CPT | Performed by: SPECIALIST

## 2019-11-07 PROCEDURE — 37000008 HC ANESTHESIA 1ST 15 MINUTES: Performed by: SPECIALIST

## 2019-11-07 PROCEDURE — 82962 GLUCOSE BLOOD TEST: CPT | Performed by: SPECIALIST

## 2019-11-07 PROCEDURE — 25000003 PHARM REV CODE 250: Performed by: SPECIALIST

## 2019-11-07 PROCEDURE — 88304 TISSUE SPECIMEN TO PATHOLOGY - SURGERY: ICD-10-PCS | Mod: 26,,, | Performed by: PATHOLOGY

## 2019-11-07 PROCEDURE — 88304 TISSUE EXAM BY PATHOLOGIST: CPT | Mod: 26,,, | Performed by: PATHOLOGY

## 2019-11-07 PROCEDURE — 37000009 HC ANESTHESIA EA ADD 15 MINS: Performed by: SPECIALIST

## 2019-11-07 PROCEDURE — 25000003 PHARM REV CODE 250: Performed by: ANESTHESIOLOGY

## 2019-11-07 PROCEDURE — 71000033 HC RECOVERY, INTIAL HOUR: Performed by: SPECIALIST

## 2019-11-07 PROCEDURE — 71000016 HC POSTOP RECOV ADDL HR: Performed by: SPECIALIST

## 2019-11-07 PROCEDURE — 25000003 PHARM REV CODE 250: Performed by: NURSE ANESTHETIST, CERTIFIED REGISTERED

## 2019-11-07 PROCEDURE — 81025 URINE PREGNANCY TEST: CPT | Performed by: ANESTHESIOLOGY

## 2019-11-07 RX ORDER — NEOSTIGMINE METHYLSULFATE 1 MG/ML
INJECTION, SOLUTION INTRAVENOUS
Status: DISCONTINUED | OUTPATIENT
Start: 2019-11-07 | End: 2019-11-07

## 2019-11-07 RX ORDER — LIDOCAINE HYDROCHLORIDE 10 MG/ML
0.5 INJECTION, SOLUTION EPIDURAL; INFILTRATION; INTRACAUDAL; PERINEURAL ONCE
Status: DISCONTINUED | OUTPATIENT
Start: 2019-11-07 | End: 2019-11-07 | Stop reason: HOSPADM

## 2019-11-07 RX ORDER — OXYCODONE AND ACETAMINOPHEN 7.5; 325 MG/1; MG/1
1 TABLET ORAL EVERY 6 HOURS PRN
Qty: 28 TABLET | Refills: 0 | Status: SHIPPED | OUTPATIENT
Start: 2019-11-07 | End: 2020-01-03 | Stop reason: CLARIF

## 2019-11-07 RX ORDER — ACETAMINOPHEN 500 MG
1000 TABLET ORAL
Status: COMPLETED | OUTPATIENT
Start: 2019-11-07 | End: 2019-11-07

## 2019-11-07 RX ORDER — ACETAMINOPHEN 325 MG/1
650 TABLET ORAL EVERY 4 HOURS PRN
Status: DISCONTINUED | OUTPATIENT
Start: 2019-11-07 | End: 2019-11-07 | Stop reason: HOSPADM

## 2019-11-07 RX ORDER — MIDAZOLAM HYDROCHLORIDE 1 MG/ML
2 INJECTION INTRAMUSCULAR; INTRAVENOUS
Status: COMPLETED | OUTPATIENT
Start: 2019-11-07 | End: 2019-11-07

## 2019-11-07 RX ORDER — GLYCOPYRROLATE 0.2 MG/ML
INJECTION INTRAMUSCULAR; INTRAVENOUS
Status: DISCONTINUED | OUTPATIENT
Start: 2019-11-07 | End: 2019-11-07

## 2019-11-07 RX ORDER — MEPERIDINE HYDROCHLORIDE 25 MG/ML
12.5 INJECTION INTRAMUSCULAR; INTRAVENOUS; SUBCUTANEOUS ONCE AS NEEDED
Status: DISCONTINUED | OUTPATIENT
Start: 2019-11-07 | End: 2019-11-07 | Stop reason: HOSPADM

## 2019-11-07 RX ORDER — MIDAZOLAM HYDROCHLORIDE 1 MG/ML
INJECTION INTRAMUSCULAR; INTRAVENOUS
Status: DISCONTINUED | OUTPATIENT
Start: 2019-11-07 | End: 2019-11-07

## 2019-11-07 RX ORDER — ONDANSETRON 2 MG/ML
INJECTION INTRAMUSCULAR; INTRAVENOUS
Status: DISCONTINUED | OUTPATIENT
Start: 2019-11-07 | End: 2019-11-07

## 2019-11-07 RX ORDER — BUPIVACAINE HCL/EPINEPHRINE 0.25-.0005
VIAL (ML) INJECTION
Status: DISCONTINUED | OUTPATIENT
Start: 2019-11-07 | End: 2019-11-07 | Stop reason: HOSPADM

## 2019-11-07 RX ORDER — CELECOXIB 200 MG/1
400 CAPSULE ORAL
Status: COMPLETED | OUTPATIENT
Start: 2019-11-07 | End: 2019-11-07

## 2019-11-07 RX ORDER — LIDOCAINE HCL/PF 100 MG/5ML
SYRINGE (ML) INTRAVENOUS
Status: DISCONTINUED | OUTPATIENT
Start: 2019-11-07 | End: 2019-11-07

## 2019-11-07 RX ORDER — PREGABALIN 75 MG/1
75 CAPSULE ORAL
Status: COMPLETED | OUTPATIENT
Start: 2019-11-07 | End: 2019-11-07

## 2019-11-07 RX ORDER — PROPOFOL 10 MG/ML
VIAL (ML) INTRAVENOUS
Status: DISCONTINUED | OUTPATIENT
Start: 2019-11-07 | End: 2019-11-07

## 2019-11-07 RX ORDER — LIDOCAINE HYDROCHLORIDE 10 MG/ML
1 INJECTION, SOLUTION EPIDURAL; INFILTRATION; INTRACAUDAL; PERINEURAL ONCE
Status: ACTIVE | OUTPATIENT
Start: 2019-11-07

## 2019-11-07 RX ORDER — ROCURONIUM BROMIDE 10 MG/ML
INJECTION, SOLUTION INTRAVENOUS
Status: DISCONTINUED | OUTPATIENT
Start: 2019-11-07 | End: 2019-11-07

## 2019-11-07 RX ORDER — FENTANYL CITRATE 50 UG/ML
INJECTION, SOLUTION INTRAMUSCULAR; INTRAVENOUS
Status: DISCONTINUED | OUTPATIENT
Start: 2019-11-07 | End: 2019-11-07

## 2019-11-07 RX ORDER — SODIUM CHLORIDE 0.9 % (FLUSH) 0.9 %
3 SYRINGE (ML) INJECTION
Status: DISCONTINUED | OUTPATIENT
Start: 2019-11-07 | End: 2019-11-07 | Stop reason: HOSPADM

## 2019-11-07 RX ORDER — OXYCODONE HYDROCHLORIDE 5 MG/1
5 TABLET ORAL EVERY 4 HOURS PRN
Status: DISCONTINUED | OUTPATIENT
Start: 2019-11-07 | End: 2019-11-07 | Stop reason: HOSPADM

## 2019-11-07 RX ORDER — HYDROMORPHONE HYDROCHLORIDE 2 MG/ML
0.4 INJECTION, SOLUTION INTRAMUSCULAR; INTRAVENOUS; SUBCUTANEOUS EVERY 5 MIN PRN
Status: DISCONTINUED | OUTPATIENT
Start: 2019-11-07 | End: 2019-11-07 | Stop reason: HOSPADM

## 2019-11-07 RX ORDER — ONDANSETRON 2 MG/ML
4 INJECTION INTRAMUSCULAR; INTRAVENOUS DAILY PRN
Status: DISCONTINUED | OUTPATIENT
Start: 2019-11-07 | End: 2019-11-07 | Stop reason: HOSPADM

## 2019-11-07 RX ORDER — ONDANSETRON 8 MG/1
8 TABLET, ORALLY DISINTEGRATING ORAL EVERY 8 HOURS PRN
Status: DISCONTINUED | OUTPATIENT
Start: 2019-11-07 | End: 2019-11-07 | Stop reason: HOSPADM

## 2019-11-07 RX ORDER — OXYCODONE HYDROCHLORIDE 5 MG/1
10 TABLET ORAL EVERY 4 HOURS PRN
Status: DISCONTINUED | OUTPATIENT
Start: 2019-11-07 | End: 2019-11-07 | Stop reason: HOSPADM

## 2019-11-07 RX ORDER — SODIUM CHLORIDE, SODIUM LACTATE, POTASSIUM CHLORIDE, CALCIUM CHLORIDE 600; 310; 30; 20 MG/100ML; MG/100ML; MG/100ML; MG/100ML
INJECTION, SOLUTION INTRAVENOUS CONTINUOUS
Status: DISCONTINUED | OUTPATIENT
Start: 2019-11-07 | End: 2019-11-07 | Stop reason: HOSPADM

## 2019-11-07 RX ORDER — OXYCODONE HYDROCHLORIDE 5 MG/1
5 TABLET ORAL
Status: DISCONTINUED | OUTPATIENT
Start: 2019-11-07 | End: 2019-11-07 | Stop reason: HOSPADM

## 2019-11-07 RX ORDER — CEFAZOLIN SODIUM 2 G/50ML
2 SOLUTION INTRAVENOUS
Status: COMPLETED | OUTPATIENT
Start: 2019-11-07 | End: 2019-11-07

## 2019-11-07 RX ORDER — SODIUM CHLORIDE 9 MG/ML
INJECTION, SOLUTION INTRAVENOUS CONTINUOUS
Status: ACTIVE | OUTPATIENT
Start: 2019-11-07

## 2019-11-07 RX ADMIN — HYDROMORPHONE HYDROCHLORIDE 0.4 MG: 2 INJECTION, SOLUTION INTRAMUSCULAR; INTRAVENOUS; SUBCUTANEOUS at 09:11

## 2019-11-07 RX ADMIN — SODIUM CHLORIDE, SODIUM LACTATE, POTASSIUM CHLORIDE, AND CALCIUM CHLORIDE: 600; 310; 30; 20 INJECTION, SOLUTION INTRAVENOUS at 06:11

## 2019-11-07 RX ADMIN — CELECOXIB 400 MG: 200 CAPSULE ORAL at 06:11

## 2019-11-07 RX ADMIN — ROCURONIUM BROMIDE 40 MG: 10 INJECTION, SOLUTION INTRAVENOUS at 07:11

## 2019-11-07 RX ADMIN — CEFAZOLIN SODIUM 2 G: 2 SOLUTION INTRAVENOUS at 07:11

## 2019-11-07 RX ADMIN — OXYCODONE HYDROCHLORIDE 5 MG: 5 TABLET ORAL at 09:11

## 2019-11-07 RX ADMIN — HYDROMORPHONE HYDROCHLORIDE 0.4 MG: 2 INJECTION, SOLUTION INTRAMUSCULAR; INTRAVENOUS; SUBCUTANEOUS at 10:11

## 2019-11-07 RX ADMIN — PREGABALIN 75 MG: 75 CAPSULE ORAL at 06:11

## 2019-11-07 RX ADMIN — SODIUM CHLORIDE, SODIUM LACTATE, POTASSIUM CHLORIDE, AND CALCIUM CHLORIDE: 600; 310; 30; 20 INJECTION, SOLUTION INTRAVENOUS at 11:11

## 2019-11-07 RX ADMIN — GLYCOPYRROLATE 0.6 MG: 0.2 INJECTION, SOLUTION INTRAMUSCULAR; INTRAVENOUS at 09:11

## 2019-11-07 RX ADMIN — SODIUM CHLORIDE, SODIUM LACTATE, POTASSIUM CHLORIDE, AND CALCIUM CHLORIDE: 600; 310; 30; 20 INJECTION, SOLUTION INTRAVENOUS at 08:11

## 2019-11-07 RX ADMIN — FENTANYL CITRATE 50 MCG: 50 INJECTION, SOLUTION INTRAMUSCULAR; INTRAVENOUS at 08:11

## 2019-11-07 RX ADMIN — ROCURONIUM BROMIDE 5 MG: 10 INJECTION, SOLUTION INTRAVENOUS at 08:11

## 2019-11-07 RX ADMIN — ACETAMINOPHEN 1000 MG: 500 TABLET, FILM COATED ORAL at 06:11

## 2019-11-07 RX ADMIN — FENTANYL CITRATE 100 MCG: 50 INJECTION, SOLUTION INTRAMUSCULAR; INTRAVENOUS at 07:11

## 2019-11-07 RX ADMIN — LIDOCAINE HYDROCHLORIDE 100 MG: 20 INJECTION, SOLUTION INTRAVENOUS at 07:11

## 2019-11-07 RX ADMIN — NEOSTIGMINE METHYLSULFATE 5 MG: 1 INJECTION INTRAVENOUS at 09:11

## 2019-11-07 RX ADMIN — ONDANSETRON 4 MG: 2 INJECTION INTRAMUSCULAR; INTRAVENOUS at 07:11

## 2019-11-07 RX ADMIN — INFLUENZA VIRUS VACCINE 0.5 ML: 15; 15; 15; 15 SUSPENSION INTRAMUSCULAR at 11:11

## 2019-11-07 RX ADMIN — PROPOFOL 200 MG: 10 INJECTION, EMULSION INTRAVENOUS at 07:11

## 2019-11-07 RX ADMIN — FENTANYL CITRATE 50 MCG: 50 INJECTION, SOLUTION INTRAMUSCULAR; INTRAVENOUS at 07:11

## 2019-11-07 RX ADMIN — MIDAZOLAM HYDROCHLORIDE 2 MG: 1 INJECTION, SOLUTION INTRAMUSCULAR; INTRAVENOUS at 07:11

## 2019-11-07 RX ADMIN — MIDAZOLAM HYDROCHLORIDE 2 MG: 1 INJECTION, SOLUTION INTRAMUSCULAR; INTRAVENOUS at 06:11

## 2019-11-07 NOTE — PLAN OF CARE
Teresa Trujillo has met all discharge criteria from Phase II. Vital Signs are stable, ambulating  without difficulty. Discharge instructions given, patient verbalized understanding. Discharged from facility via wheelchair in stable condition.     Awaiting [facility] ambulance for

## 2019-11-07 NOTE — OP NOTE
[facility]  Operative Note    SUMMARY     Surgery Date: 11/7/2019     Surgeon(s) and Role:     * Juan Miguel Quinn Jr., MD - Primary    Assisting Surgeon: None    Pre-op Diagnosis:  Disease of gallbladder [K82.9]    Post-op Diagnosis:  Post-Op Diagnosis Codes:     * Disease of gallbladder [K82.9]    Procedure(s) (LRB):  CHOLECYSTECTOMY, LAPAROSCOPIC (N/A)    Anesthesia: General    Description of Procedure:  The patient brought the operating room and placed in supine position, the abdomen prepped draped in sterile fashion. Small incision made in left upper quadrant , a Veress needle inserted and a pneumoperitoneum achieved. A 5 mm Optiview trocar was inserted in right upper quadrant in the midclavicular.  An additional 5 mm trocar was inserted in the upper midline. A 3rd 5 mm trocar was inserted in the anterior axillary line in right upper quadrant. 10 mm Optiview trocar was inserted at the umbilicus. The gallbladder was visualized and seen to be chronically inflamed using blunt dissection adhesions to the gallbladder were taken down exposing the cystic artery and duct. These were doubly clamped proximally and then transected.  The gallbladder then removed from liver bed using electrocautery Bovie.  Under direct observation with the aid of an Endo-Catch the gallbladder was removed through the periumbilical port. The peritoneal cavity then inspected and irrigated. The pneumoperitoneum released. The fascia the umbilicus closed with 0 Vicryl. The skin with running 4 Monocryl. The patient tolerated the procedure well left the operating room in good condition. At the end the procedure all sponge lap and instrument counts were correct.    Estimated Blood Loss:  50 cc         Specimens:   Specimen (12h ago, onward)     Start     Ordered    11/07/19 0816  Specimen to Pathology - Surgery  Once     Comments:  1 gallbladder      11/07/19 0845                    SUMMARY     Admit Date:     Discharge Date and  Time:  11/07/2019 9:40 AM    Hospital Course (synopsis of major diagnoses, care, treatment, and services provided during the course of the hospital stay):  Benign     Final Diagnosis: Post-Op Diagnosis Codes:     * Disease of gallbladder [K82.9]    Disposition: Home or Self Care    Follow Up/Patient Instructions:     Medications:  Reconciled Home Medications:      Medication List      START taking these medications    oxyCODONE-acetaminophen 7.5-325 mg per tablet  Commonly known as:  PERCOCET  Take 1 tablet by mouth every 6 (six) hours as needed.        CONTINUE taking these medications    metFORMIN 500 MG tablet  Commonly known as:  GLUCOPHAGE  Take 500 mg by mouth 2 (two) times daily with meals.     norethindrone-ethinyl estradiol-iron 1.5 mg-30 mcg (21)/75 mg (7) tablet  Commonly known as:  MICROGESTIN FE1.5/30  Take 1 tablet by mouth once daily.          Discharge Procedure Orders   Diet general     Call MD for:  persistent nausea and vomiting     Call MD for:  severe uncontrolled pain     Call MD for:  redness, tenderness, or signs of infection (pain, swelling, redness, odor or green/yellow discharge around incision site)     Lifting restrictions   Order Comments: 10 Lb's     Wound care routine (specify)   Order Comments: Wound care routine:   Leave dressing intact  May shower     Follow-up Information     Juan Miguel Quinn Jr, MD.    Specialties:  Vascular Surgery, General Surgery  Contact information:  [address]  [address] [id number] [phone]

## 2019-11-07 NOTE — ANESTHESIA POSTPROCEDURE EVALUATION
Anesthesia Post Evaluation    Patient: Teresa Trujillo    Procedure(s) Performed: Procedure(s) (LRB):  CHOLECYSTECTOMY, LAPAROSCOPIC (N/A)    Final Anesthesia Type: general  Patient location during evaluation: PACU  Patient participation: Yes- Able to Participate  Level of consciousness: awake and alert  Post-procedure vital signs: reviewed and stable  Pain management: adequate  Airway patency: patent  PONV status at discharge: No PONV  Anesthetic complications: no      Cardiovascular status: blood pressure returned to baseline  Respiratory status: unassisted and spontaneous ventilation  Hydration status: euvolemic  Follow-up not needed.          Vitals Value Taken Time   /59 11/7/2019 10:50 AM   Temp 37.1 °C (98.8 °F) 11/7/2019 10:45 AM   Pulse 88 11/7/2019 10:59 AM   Resp 16 11/7/2019 10:35 AM   SpO2 95 % 11/7/2019 10:59 AM   Vitals shown include unvalidated device data.      No case tracking events are documented in the log.      Pain/Samantha Score: Pain Rating Prior to Med Admin: 9 (11/7/2019 10:20 AM)  Pain Rating Post Med Admin: 6 (11/7/2019 10:39 AM)  Samantha Score: 9 (11/7/2019 10:39 AM)

## 2019-11-07 NOTE — PLAN OF CARE
Teresa Trujillo has met all discharge criteria from Phase II. Vital Signs are stable, ambulating  without difficulty. Discharge instructions given, patient verbalized understanding. Discharged from facility via wheelchair in stable condition.

## 2019-11-07 NOTE — DISCHARGE INSTRUCTIONS
Discharge Instructions for Laparoscopic Cholecystectomy  You have had a procedure known as a laparoscopic cholecystectomy. A laparoscopic cholecystectomy is a procedure to remove your gallbladder. People who have this procedure usually recover more quickly and have less pain than with open gallbladder surgery (called open cholecystectomy). Many surgeons recommend a low-fat diet, avoiding fried food in particular, for the first month after surgery.   You can live a full and healthy life without your gallbladder. This includes eating the foods and doing the things you enjoyed before your gallbladder problems started.    Home care  Recommendations for home care include the following:   · Ask someone to drive you to your appointments for the next 3 days. Dont drive until you are no longer taking pain medicine and are able to step on the brake pedal without hesitation.   · Wash the skin around your incision daily with mild soap and water. It's OK to shower the day after your surgery.  · Eat your regular diet. It is wise to stay away from rich, greasy, or spicy food for a few days.  · Remember, it takes at least 1 week for you to get most of your strength and energy back.  · Make an office visit to talk to your healthcare provider if the following symptoms dont go away within a week after your surgery:  ¨ Fatigue  ¨ Pain around the incision  ¨ Diarrhea or constipation  ¨ Loss of appetite     When to call your healthcare provider  Call your healthcare provider immediately if you have any of the following:  · Yellowing of your eyes or skin (jaundice)  · Chills  · Fever of 100.4°F (38.0°C) or higher, or as directed by your healthcare provider   · Redness, swelling, increasing pain, pus, or a foul smell at the incision site  · Dark or rust-colored urine  · Stool that is brett-colored or light in color instead of brown  · Increasing belly pain  · Rectal bleeding  · Leg swelling or shortness of breath         Anesthesia:  After Your Surgery  Youve just had surgery. During surgery, you received medication called anesthesia to keep you comfortable and pain-free. After surgery, you may experience some pain or nausea. This is common. Here are some tips for feeling better and recovering after surgery.    Going home  Your doctor or nurse will show you how to take care of yourself when you go home. He or she will also answer your questions. Have an adult family member or friend drive you home. For the first 24 hours after your surgery:    · Do not drive or use heavy equipment.  · Do not make important decisions or sign legal documents.  · Avoid alcohol.  · Have someone stay with you, if needed. He or she can watch for problems and help keep you safe.  · Take your time getting up from a seated or lying position. You may experience dizziness for 24 hours.    Be sure to keep all follow-up appointments with your doctor. And rest after your procedure for as long as your doctor tells you to.    Coping with pain  If you have pain after surgery, pain medication will help you feel better. Take it as directed, before pain becomes severe. Also, ask your doctor or pharmacist about other ways to control pain, such as with heat, ice, and relaxation. And follow any other instructions your surgeon or nurse gives you.    URINARY RETENTION  Should you experience a decrease in your urine output or are unable to urinate following surgery, this can be due to the medications given during surgery.  We recommend you going to the nearest Emergency Department.    Tips for taking pain medication  To get the best relief possible, remember these points:    · Pain medications can upset your stomach. Taking them with a little food may help.  · Most pain relievers taken by mouth need at least 20 to 30 minutes to take effect.  · Taking medication on a schedule can help you remember to take it. Try to time your medication so that you can take it before beginning an  activity, such as dressing, walking, or sitting down for dinner.  · Constipation is a common side effect of pain medications. Contact your doctor before taking any medications like laxatives or stool softeners to help relieve constipation. Also ask about any dietary restrictions, because drinking lots of fluids and eating foods like fruits and vegetables that are high in fiber can also help. Remember, dont take laxatives unless your surgeon has prescribed them.  · Mixing alcohol and pain medication can cause dizziness and slow your breathing. It can even be fatal. Dont drink alcohol while taking pain medication.  · Pain medication can slow your reflexes. Dont drive or operate machinery while taking pain medication.    If your health care provider tells you to take acetaminophen to help relieve your pain, ask him or her how much you are supposed to take each day. (Acetaminophen is the generic name for Tylenol and other brand-name pain relievers.) Acetaminophen or other pain relievers may interact with your prescription medicines or other over-the-counter (OTC) drugs. Some prescription medications contain acetaminophen along with other active ingredients. Using both prescription and OTC acetaminophen for pain can cause you to overdose. The FDA recommends that you read the labels on your OTC medications carefully. This will help you to clearly understand the list of active ingredients, dosing instructions, and any warnings. It may also help you avoid taking too much acetaminophen. If you have questions or don't understand the information, ask your pharmacist or health care provider to explain it to you before you take the OTC medication.    Managing nausea  Some people have an upset stomach after surgery. This is often due to anesthesia, pain, pain medications, or the stress of surgery. The following tips will help you manage nausea and get good nutrition as you recover. If you were on a special diet before surgery,  ask your doctor if you should follow it during recovery. These tips may help:    · Dont push yourself to eat. Your body will tell you when to eat and how much.  · Start off with clear liquids and soup. They are easier to digest.  · Progress to semi-solid foods (mashed potatoes, applesauce, and gelatin) as you feel ready.  · Slowly move to solid foods. Dont eat fatty, rich, or spicy foods at first.  · Dont force yourself to have three large meals a day. Instead, eat smaller amounts more often.  · Take pain medications with a small amount of solid food, such as crackers or toast to avoid nausea.      Call your surgeon if    · You feel too sleepy, dizzy, or groggy (medication may be too strong).  · You have side effects like nausea, vomiting, or skin changes (rash, itching, or hives).     © 9296-7746 The "[employer]. [address] [place] [address], [place] [address]. All rights reserved. This information is not intended as a substitute for professional medical care. Always follow your healthcare professional's instructions.    PLEASE FOLLOW ANY OTHER INSTRUCTIONS PROVIDED TO YOU BY DR. PEGUERO!

## 2019-11-07 NOTE — TRANSFER OF CARE
"Anesthesia Transfer of Care Note    Patient: Teresa Trujillo    Procedure(s) Performed: Procedure(s) (LRB):  CHOLECYSTECTOMY, LAPAROSCOPIC (N/A)    Patient location: PACU    Anesthesia Type: general    Transport from OR: Transported from OR on 2-3 L/min O2 by NC with adequate spontaneous ventilation    Post pain: adequate analgesia    Post assessment: no apparent anesthetic complications    Post vital signs: stable    Level of consciousness: awake and alert    Nausea/Vomiting: no nausea/vomiting    Complications: none    Transfer of care protocol was followed      Last vitals:   Visit Vitals  BP (!) 141/77 (BP Location: Right arm, Patient Position: Lying)   Pulse 70   Temp 36.9 °C (98.5 °F) (Oral)   Resp 18   Ht 5' 9" (1.753 m)   Wt 67.1 kg (148 lb)   LMP 10/17/2019   SpO2 98%   Breastfeeding? No   BMI 21.86 kg/m²     "

## 2019-12-02 ENCOUNTER — HOSPITAL ENCOUNTER (EMERGENCY)
Facility: OTHER | Age: 54
Discharge: HOME | End: 2019-12-03
Attending: EMERGENCY MEDICINE
Payer: COMMERCIAL

## 2019-12-02 DIAGNOSIS — R10.11 RIGHT UPPER QUADRANT ABDOMINAL PAIN: ICD-10-CM

## 2019-12-02 DIAGNOSIS — K59.00 CONSTIPATION, UNSPECIFIED CONSTIPATION TYPE: Primary | ICD-10-CM

## 2019-12-02 DIAGNOSIS — R93.5 ABNORMAL CT OF THE ABDOMEN: ICD-10-CM

## 2019-12-02 LAB
ALBUMIN SERPL BCP-MCNC: 4.2 G/DL (ref 3.5–5.2)
ALP SERPL-CCNC: 72 U/L (ref 55–135)
ALT SERPL W/O P-5'-P-CCNC: 10 U/L (ref 10–44)
ANION GAP SERPL CALC-SCNC: 12 MMOL/L (ref 8–16)
AST SERPL-CCNC: 29 U/L (ref 10–40)
BASOPHILS # BLD AUTO: 0.05 K/UL (ref 0–0.2)
BASOPHILS NFR BLD: 1.1 % (ref 0–1.9)
BILIRUB SERPL-MCNC: 0.5 MG/DL (ref 0.1–1)
BILIRUB UR QL STRIP: NEGATIVE
BUN SERPL-MCNC: 6 MG/DL (ref 6–20)
CALCIUM SERPL-MCNC: 9.9 MG/DL (ref 8.7–10.5)
CHLORIDE SERPL-SCNC: 103 MMOL/L (ref 95–110)
CLARITY UR: CLEAR
CO2 SERPL-SCNC: 24 MMOL/L (ref 23–29)
COLOR UR: YELLOW
CREAT SERPL-MCNC: 0.8 MG/DL (ref 0.5–1.4)
DIFFERENTIAL METHOD: ABNORMAL
EOSINOPHIL # BLD AUTO: 0.3 K/UL (ref 0–0.5)
EOSINOPHIL NFR BLD: 7.4 % (ref 0–8)
ERYTHROCYTE [DISTWIDTH] IN BLOOD BY AUTOMATED COUNT: 13.7 % (ref 11.5–14.5)
EST. GFR  (AFRICAN AMERICAN): >60 ML/MIN/1.73 M^2
EST. GFR  (NON AFRICAN AMERICAN): >60 ML/MIN/1.73 M^2
GLUCOSE SERPL-MCNC: 100 MG/DL (ref 70–110)
GLUCOSE UR QL STRIP: NEGATIVE
HCT VFR BLD AUTO: 36.2 % (ref 37–48.5)
HGB BLD-MCNC: 11.3 G/DL (ref 12–16)
HGB UR QL STRIP: ABNORMAL
IMM GRANULOCYTES # BLD AUTO: 0.01 K/UL (ref 0–0.04)
IMM GRANULOCYTES NFR BLD AUTO: 0.2 % (ref 0–0.5)
KETONES UR QL STRIP: NEGATIVE
LEUKOCYTE ESTERASE UR QL STRIP: NEGATIVE
LIPASE SERPL-CCNC: 21 U/L (ref 4–60)
LYMPHOCYTES # BLD AUTO: 2.6 K/UL (ref 1–4.8)
LYMPHOCYTES NFR BLD: 57 % (ref 18–48)
MCH RBC QN AUTO: 29 PG (ref 27–31)
MCHC RBC AUTO-ENTMCNC: 31.2 G/DL (ref 32–36)
MCV RBC AUTO: 93 FL (ref 82–98)
MONOCYTES # BLD AUTO: 0.4 K/UL (ref 0.3–1)
MONOCYTES NFR BLD: 9.8 % (ref 4–15)
NEUTROPHILS # BLD AUTO: 1.1 K/UL (ref 1.8–7.7)
NEUTROPHILS NFR BLD: 24.5 % (ref 38–73)
NITRITE UR QL STRIP: NEGATIVE
NRBC BLD-RTO: 0 /100 WBC
PH UR STRIP: 6 [PH] (ref 5–8)
PLATELET # BLD AUTO: 247 K/UL (ref 150–350)
PLATELET BLD QL SMEAR: ABNORMAL
PMV BLD AUTO: 12.7 FL (ref 9.2–12.9)
POTASSIUM SERPL-SCNC: 4.8 MMOL/L (ref 3.5–5.1)
PROT SERPL-MCNC: 8.7 G/DL (ref 6–8.4)
PROT UR QL STRIP: NEGATIVE
RBC # BLD AUTO: 3.89 M/UL (ref 4–5.4)
SODIUM SERPL-SCNC: 139 MMOL/L (ref 136–145)
SP GR UR STRIP: <=1.005 (ref 1–1.03)
URN SPEC COLLECT METH UR: ABNORMAL
UROBILINOGEN UR STRIP-ACNC: NEGATIVE EU/DL
WBC # BLD AUTO: 4.47 K/UL (ref 3.9–12.7)

## 2019-12-02 PROCEDURE — 96374 THER/PROPH/DIAG INJ IV PUSH: CPT

## 2019-12-02 PROCEDURE — 63600175 PHARM REV CODE 636 W HCPCS: Performed by: EMERGENCY MEDICINE

## 2019-12-02 PROCEDURE — 85025 COMPLETE CBC W/AUTO DIFF WBC: CPT

## 2019-12-02 PROCEDURE — 80053 COMPREHEN METABOLIC PANEL: CPT

## 2019-12-02 PROCEDURE — 81003 URINALYSIS AUTO W/O SCOPE: CPT

## 2019-12-02 PROCEDURE — 99285 EMERGENCY DEPT VISIT HI MDM: CPT | Mod: 25

## 2019-12-02 PROCEDURE — 83690 ASSAY OF LIPASE: CPT

## 2019-12-02 RX ORDER — KETOROLAC TROMETHAMINE 30 MG/ML
30 INJECTION, SOLUTION INTRAMUSCULAR; INTRAVENOUS
Status: COMPLETED | OUTPATIENT
Start: 2019-12-02 | End: 2019-12-02

## 2019-12-02 RX ADMIN — KETOROLAC TROMETHAMINE 30 MG: 30 INJECTION, SOLUTION INTRAMUSCULAR; INTRAVENOUS at 11:12

## 2019-12-02 RX ADMIN — IOHEXOL 75 ML: 350 INJECTION, SOLUTION INTRAVENOUS at 11:12

## 2019-12-03 VITALS
OXYGEN SATURATION: 98 % | RESPIRATION RATE: 17 BRPM | DIASTOLIC BLOOD PRESSURE: 76 MMHG | SYSTOLIC BLOOD PRESSURE: 132 MMHG | HEIGHT: 69 IN | WEIGHT: 148 LBS | HEART RATE: 82 BPM | TEMPERATURE: 98 F | BODY MASS INDEX: 21.92 KG/M2

## 2019-12-03 PROCEDURE — 25500020 PHARM REV CODE 255: Performed by: EMERGENCY MEDICINE

## 2019-12-03 RX ORDER — NAPROXEN 500 MG/1
500 TABLET ORAL 2 TIMES DAILY WITH MEALS
Qty: 40 TABLET | Refills: 0 | Status: SHIPPED | OUTPATIENT
Start: 2019-12-03 | End: 2019-12-23

## 2019-12-03 NOTE — ED TRIAGE NOTES
Pt arrives to Ed with c/o right sided flank pain with onset Thursday. Pt reports taking OTC medications with no relief of symptoms. Pt lying in bed, respirations even, unlabored, eyes open spontaneously, NAD noted, AAOx4, answering questions appropriately.

## 2019-12-03 NOTE — DISCHARGE INSTRUCTIONS
Take magnesium citrate as directed for constipation.  Follow up with Dr. Quinn in 2 days for re-evaluation.  Take naproxen as needed for pain.

## 2019-12-03 NOTE — ED PROVIDER NOTES
Encounter Date: 2019    SCRIBE #1 NOTE: I, Brian Keith, am scribing for, and in the presence of, Dr. Moses.       History     Chief Complaint   Patient presents with    Flank Pain     lower back/ right flank pain for a few weeks. pt had gallstones and gallbladder removed in November. Denies fever, nv     Time seen by provider: 9:21 PM    This is a 54 y.o. female who presents with complaint of waxing and waning right sided flank pain that radiates underneath her right breast which began a few weeks ago. The patient reports pain was most severe four days ago. She reports laying down on her right side worsens the pain but denies alleviating factors. The patient reports decreased appetite but denies fever, chills, nausea, vomiting, diarrhea, or rash anywhere on her skin. She reports having a cholecystectomy on 2019 and following up on 2019 with no complications. The patient reports taking pain medication following the surgery but reports no relief in pain. She reports being seen today by Dr. Bernal because she thought the pain was a pulled muscle. She states having imaging done and told pain be related to her kidneys but denies being treated today. The patient reports she follows up with GI on .    The history is provided by the patient.     Review of patient's allergies indicates:   Allergen Reactions    Atorvastatin      Past Medical History:   Diagnosis Date    Diabetes mellitus, type 2     Hyperlipidemia      Past Surgical History:   Procedure Laterality Date     SECTION      LAPAROSCOPIC CHOLECYSTECTOMY N/A 2019    Procedure: CHOLECYSTECTOMY, LAPAROSCOPIC;  Surgeon: Juan Miguel Quinn Jr., MD;  Location: [facility];  Service: General;  Laterality: N/A;    LIVER BIOPSY       No family history on file.  Social History     Tobacco Use    Smoking status: Never Smoker    Smokeless tobacco: Never Used   Substance Use Topics    Alcohol use: Yes     Comment: casually    Drug use: No      Review of Systems   Constitutional: Positive for appetite change (Decreased.). Negative for chills and fever.   HENT: Negative for sore throat.    Respiratory: Negative for shortness of breath.    Cardiovascular: Negative for chest pain.   Gastrointestinal: Negative for diarrhea, nausea and vomiting.   Genitourinary: Positive for flank pain (Right.). Negative for dysuria.   Skin: Negative for rash.   Neurological: Negative for weakness.   Hematological: Does not bruise/bleed easily.   All other systems reviewed and are negative.      Physical Exam     Initial Vitals [12/02/19 2102]   BP Pulse Resp Temp SpO2   (!) 167/82 81 16 98.6 °F (37 °C) 99 %      MAP       --         Physical Exam    Nursing note and vitals reviewed.  Constitutional: She appears well-developed and well-nourished. She is not diaphoretic. No distress.   HENT:   Head: Normocephalic and atraumatic.   Mouth/Throat: Oropharynx is clear and moist.   Eyes: Conjunctivae and EOM are normal.   Neck: Normal range of motion. Neck supple.   Cardiovascular: Normal rate, regular rhythm and normal heart sounds.   Pulmonary/Chest: Breath sounds normal. No respiratory distress. She has no wheezes. She has no rhonchi. She has no rales.   Abdominal: Soft. There is no rebound and no guarding.   Mild epigastric, periumbilical, and suprapubic tenderness.   Musculoskeletal: Normal range of motion.   Right lumbar/lower thoracic tenderness to palpation.   Neurological: She is alert and oriented to person, place, and time.   Skin: Skin is warm and dry.   No overlying skin changes.         ED Course   Procedures  Labs Reviewed   CBC W/ AUTO DIFFERENTIAL - Abnormal; Notable for the following components:       Result Value    RBC 3.89 (*)     Hemoglobin 11.3 (*)     Hematocrit 36.2 (*)     Mean Corpuscular Hemoglobin Conc 31.2 (*)     Gran # (ANC) 1.1 (*)     Gran% 24.5 (*)     Lymph% 57.0 (*)     Platelet Estimate Clumped (*)     All other components within normal  limits   COMPREHENSIVE METABOLIC PANEL - Abnormal; Notable for the following components:    Total Protein 8.7 (*)     All other components within normal limits   URINALYSIS, REFLEX TO URINE CULTURE - Abnormal; Notable for the following components:    Specific Gravity, UA <=1.005 (*)     Occult Blood UA Trace (*)     All other components within normal limits    Narrative:     Preferred Collection Type->Urine, Clean Catch   LIPASE          Imaging Results          CT Abdomen Pelvis With Contrast (Final result)  Result time 12/02/19 23:36:08    Final result by Kathleen Austin MD (12/02/19 23:36:08)                 Impression:      Small amount of herniated liver into the right lower thoracic cavity.  Question any history of diaphragmatic trauma or injury.    Cholecystectomy    Moderately large colonic stool.      Electronically signed by: Kathleen Austin  Date:    12/02/2019  Time:    23:36             Narrative:    EXAMINATION:  CT OF ABDOMEN PELVIS WITH    CLINICAL HISTORY:  Abdominal distension;ruq pain;    TECHNIQUE:  5 mm enhanced axial images were obtained from the lung bases through the greater trochanters.  Seventy-five mL of Omnipaque 350 was injected.    COMPARISON:  None.    FINDINGS:  The right lobe of the liver is elongated, which may suggest a Riedel's lobe versus mild hepatomegaly. There is a small amount of the hepatic dome herniated through the diaphragm into the lower thorax.    The spleen, pancreas, kidneys, and adrenal glands are unremarkable. The gallbladder is surgically absent.    There is no definite evidence for abdominal adenopathy or ascites.    Moderately large colonic stool is present.  There are no pelvic masses or adenopathy.    There is no free fluid in the pelvis.    There is mild bibasilar linear atelectasis.                                 Medical Decision Making:   History:   Old Medical Records: I decided to obtain old medical records.  Clinical Tests:   Lab Tests: Ordered and  "Reviewed    Additional MDM:   Comments: 54-year-old female presents complaining of constant right-sided lumbar paraspinal pain that radiates to her RUQ since her lap cholecystectomy 3.5 weeks ago.  She denies any associated sx.  On exam she had lumbar tenderness to light touch.  She had no focal abd ttp.  No overlying skin changes concerning for zoster.  Labs, UA and CT of abd/pel with contrast obtained.  Labs and UA without significant abnormalities.  CT + for "Small amount of herniated liver into the right lower thoracic cavity" as well as large amount of stool.  The case was d/w Dr. Quinn who recommended clinic follow up on 12/4.  Results were also d/w the patient as well as the plan to have her take magnesium citrate at home in the morning.  Rx for naproxen given for pain since she experienced pain relief in the ED with toradol.  Patient verbalized understanding and agreement with the plan and was d/c'ed home in stable condition with her .  .          Scribe Attestation:   Scribe #1: I performed the above scribed service and the documentation accurately describes the services I performed. I attest to the accuracy of the note.    Attending Attestation:           Physician Attestation for Scribe:  Physician Attestation Statement for Scribe #1: I, Dr. Moses, reviewed documentation, as scribed by Brian Parker in my presence, and it is both accurate and complete.                               Clinical Impression:     1. Constipation, unspecified constipation type    2. Abnormal CT of the abdomen    3. Right upper quadrant abdominal pain                                Leann Moses MD  12/03/19 0044    "

## 2019-12-04 ENCOUNTER — PES CALL (OUTPATIENT)
Dept: ADMINISTRATIVE | Facility: CLINIC | Age: 54
End: 2019-12-04

## 2019-12-05 ENCOUNTER — DOCUMENTATION ONLY (OUTPATIENT)
Dept: BARIATRICS | Facility: CLINIC | Age: 54
End: 2019-12-05
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
## 2019-12-10 ENCOUNTER — HOSPITAL ENCOUNTER (OUTPATIENT)
Dept: RADIOLOGY | Facility: OTHER | Age: 54
Discharge: HOME | End: 2019-12-10
Attending: INTERNAL MEDICINE
Payer: COMMERCIAL

## 2019-12-10 DIAGNOSIS — M54.6 THORACIC BACK PAIN: ICD-10-CM

## 2019-12-10 DIAGNOSIS — R31.9 BLOOD IN URINE: ICD-10-CM

## 2019-12-10 PROCEDURE — 74176 CT ABD & PELVIS W/O CONTRAST: CPT | Mod: 26,,, | Performed by: RADIOLOGY

## 2019-12-10 PROCEDURE — 74176 CT ABD & PELVIS W/O CONTRAST: CPT | Mod: TC

## 2019-12-10 PROCEDURE — 74176 CT ABDOMEN PELVIS WITHOUT CONTRAST: ICD-10-PCS | Mod: 26,,, | Performed by: RADIOLOGY

## 2019-12-16 ENCOUNTER — TELEPHONE (OUTPATIENT)
Dept: SURGERY | Facility: CLINIC | Age: 54
End: 2019-12-16

## 2019-12-16 NOTE — TELEPHONE ENCOUNTER
----- Message from Gianna Crocker sent at 12/16/2019 12:09 PM CST -----  Contact: Pt   Reason: Pt was referral by  pertaining to her abdominal area she's looking to get a sooner appt.    Communication: [phone]

## 2019-12-16 NOTE — TELEPHONE ENCOUNTER
Left message to contact me regarding an earlier appointment, left my number and a possible Thursday slot.

## 2019-12-19 ENCOUNTER — OFFICE VISIT (OUTPATIENT)
Dept: SURGERY | Facility: CLINIC | Age: 54
End: 2019-12-19
Payer: COMMERCIAL

## 2019-12-19 VITALS
HEIGHT: 69 IN | HEART RATE: 82 BPM | WEIGHT: 147.5 LBS | DIASTOLIC BLOOD PRESSURE: 79 MMHG | SYSTOLIC BLOOD PRESSURE: 142 MMHG | BODY MASS INDEX: 21.84 KG/M2

## 2019-12-19 DIAGNOSIS — K44.9 DIAPHRAGMATIC HERNIA WITHOUT OBSTRUCTION AND WITHOUT GANGRENE: ICD-10-CM

## 2019-12-19 PROBLEM — K82.9 GALLBLADDER ATTACK: Status: RESOLVED | Noted: 2019-11-07 | Resolved: 2019-12-19

## 2019-12-19 PROCEDURE — 99204 PR OFFICE/OUTPT VISIT, NEW, LEVL IV, 45-59 MIN: ICD-10-PCS | Mod: S$GLB,,, | Performed by: SURGERY

## 2019-12-19 PROCEDURE — 99204 OFFICE O/P NEW MOD 45 MIN: CPT | Mod: S$GLB,,, | Performed by: SURGERY

## 2019-12-19 PROCEDURE — 3008F BODY MASS INDEX DOCD: CPT | Mod: CPTII,S$GLB,, | Performed by: SURGERY

## 2019-12-19 PROCEDURE — 99999 PR PBB SHADOW E&M-EST. PATIENT-LVL III: ICD-10-PCS | Mod: PBBFAC,,, | Performed by: SURGERY

## 2019-12-19 PROCEDURE — 3008F PR BODY MASS INDEX (BMI) DOCUMENTED: ICD-10-PCS | Mod: CPTII,S$GLB,, | Performed by: SURGERY

## 2019-12-19 PROCEDURE — 99999 PR PBB SHADOW E&M-EST. PATIENT-LVL III: CPT | Mod: PBBFAC,,, | Performed by: SURGERY

## 2019-12-19 RX ORDER — PANTOPRAZOLE SODIUM 40 MG/1
40 TABLET, DELAYED RELEASE ORAL DAILY
COMMUNITY
End: 2023-01-12

## 2019-12-19 NOTE — PROGRESS NOTES
Patient ID: Teresa Trujillo is a 54 y.o. female.    Chief Complaint: Consult (diaphragmatic hernia)      HPI:  Ms. Trujillo is a 53 yo female here in clinic today for diaphragmatic hernia consult. Patient states that on thanksgiving day after dinner she began having severe back pain, rated as a 13/10 in middle of her back mostly on the right side. Pain did not radiate anywhere else. Pain did not subside and so she went to ED on 12/2 where they did a CT with contrast which showed a hernia. Today pain is 2/10, managed with naproxen. She states she also had a CT without contrast done to rule out kidney stones.    Review of Systems   Constitutional: Negative for chills, fever and unexpected weight change.   HENT: Negative for sinus pressure, sinus pain, sore throat and trouble swallowing.    Respiratory: Negative for cough, choking and shortness of breath.    Cardiovascular: Negative for chest pain and palpitations.   Gastrointestinal: Positive for abdominal pain. Negative for blood in stool, constipation, diarrhea, nausea and vomiting.        Mild LUQ pain since lap trupti 11/2019   Genitourinary: Negative for difficulty urinating, dysuria, frequency and urgency.   Musculoskeletal: Positive for back pain.        Mid back pain on R side   Skin: Negative for color change.   Neurological: Positive for headaches. Negative for dizziness.        Has history migraines   Hematological: Does not bruise/bleed easily.       Current Outpatient Medications   Medication Sig Dispense Refill    metFORMIN (GLUCOPHAGE) 500 MG tablet Take 500 mg by mouth 2 (two) times daily with meals.      naproxen (NAPROSYN) 500 MG tablet Take 1 tablet (500 mg total) by mouth 2 (two) times daily with meals. for 20 days 40 tablet 0    naproxen (NAPROSYN) 500 MG tablet Take 1 tablet (500 mg total) by mouth 2 (two) times daily. 40 tablet 0    norethindrone-ethinyl estradiol-iron (MICROGESTIN FE1.5/30) 1.5 mg-30 mcg (21)/75 mg (7) tablet Take 1  tablet by mouth once daily.      pantoprazole (PROTONIX) 20 MG tablet Take 20 mg by mouth once daily.      oxyCODONE-acetaminophen (PERCOCET) 7.5-325 mg per tablet Take 1 tablet by mouth every 6 (six) hours as needed. (Patient not taking: Reported on 2019) 28 tablet 0    oxyCODONE-acetaminophen (PERCOCET) 7.5-325 mg per tablet Take 1 tablet by mouth every 6 (six) hours as needed. (Patient not taking: Reported on 2019) 20 tablet 0     No current facility-administered medications for this visit.      Facility-Administered Medications Ordered in Other Visits   Medication Dose Route Frequency Provider Last Rate Last Dose    0.9%  NaCl infusion   Intravenous Continuous Juan Miguel Quinn Jr., MD        lidocaine (PF) 10 mg/ml (1%) injection 10 mg  1 mL Intradermal Once Juan Miguel Quinn Jr., MD           Review of patient's allergies indicates:   Allergen Reactions    Atorvastatin        Past Medical History:   Diagnosis Date    Diabetes mellitus, type 2     Hyperlipidemia        Past Surgical History:   Procedure Laterality Date     SECTION      LAPAROSCOPIC CHOLECYSTECTOMY N/A 2019    Procedure: CHOLECYSTECTOMY, LAPAROSCOPIC;  Surgeon: Juan Miguel Quinn Jr., MD;  Location: [facility];  Service: General;  Laterality: N/A;    LIVER BIOPSY         No family history on file.    Social History     Socioeconomic History    Marital status:      Spouse name: Not on file    Number of children: Not on file    Years of education: Not on file    Highest education level: Not on file   Occupational History    Not on file   Social Needs    Financial resource strain: Not on file    Food insecurity:     Worry: Not on file     Inability: Not on file    Transportation needs:     Medical: Not on file     Non-medical: Not on file   Tobacco Use    Smoking status: Never Smoker    Smokeless tobacco: Never Used   Substance and Sexual Activity    Alcohol use: Yes     Comment: casually    Drug use: No     Sexual activity: Not on file   Lifestyle    Physical activity:     Days per week: Not on file     Minutes per session: Not on file    Stress: Not on file   Relationships    Social connections:     Talks on phone: Not on file     Gets together: Not on file     Attends Protestant service: Not on file     Active member of club or organization: Not on file     Attends meetings of clubs or organizations: Not on file     Relationship status: Not on file   Other Topics Concern    Not on file   Social History Narrative    Not on file       Vitals:    12/19/19 1323   BP: (!) 142/79   Pulse: 82       Physical Exam   Constitutional: She is oriented to person, place, and time. She appears well-developed and well-nourished. She does not appear ill.   HENT:   Head: Normocephalic and atraumatic.   Cardiovascular: Normal rate, regular rhythm, normal heart sounds and intact distal pulses.   Pulmonary/Chest: Effort normal and breath sounds normal.   Abdominal: Soft. Bowel sounds are normal. There is no tenderness.   Neurological: She is alert and oriented to person, place, and time. GCS eye subscore is 4. GCS verbal subscore is 5. GCS motor subscore is 6.   Skin: Skin is warm and dry. Capillary refill takes less than 2 seconds. No erythema.   Psychiatric: She has a normal mood and affect. Her behavior is normal.   Vitals reviewed.    Labs have been reviewed.    Imaging:  CT abdomen pelvis w/ contrast 12/2/19:  Impression:  Small amount of herniated liver into the right lower thoracic cavity.  Question any history of diaphragmatic trauma or injury.  Cholecystectomy  Moderately large colonic stool.    CT w/out contrast 12/6/19:  Impression:  No acute findings.  Unchanged diaphragmatic hernia with a portion of liver herniated into the right lower chest.  This was better evaluated on contrasted CT.      Assessment & Plan:   55 yo female with diaphragmatic hernia  - laparoscopic repair with or without mesh

## 2019-12-19 NOTE — LETTER
[facility] - General Surgery  [address]  [place] [address]  Phone: [phone] December 19, 2019      Halima Laughlin MD  [address] [facility]  [place] [address]    Patient: Teresa Trujillo   MR Number: [id number]   YOB: 1965   Date of Visit: 12/19/2019     Dear Dr. Laughlin:    Thank you for referring Teresa Trujillo to me for evaluation. Attached you will find relevant portions of my assessment and plan of care.    Ms. Trujillo presents with right diaphragmatic hernia in the mid dome.  This is likely traumatic as it is not a named hernia.  This is symptomatic and she has right mid back pain that has improved, although she is taking NSAIDs regularly for it.      We will plan for laparoscopic repair with or without mesh. She will need a 23 hour stay.    If you have questions, please do not hesitate to call me. I look forward to following Teresa Trujillo along with you.    Sincerely,      Alexander Trujillo MD  Professor, [employer]  Section Head, General Surgery  [facility]    WSR/afw    CC  Juan Miguel Quinn Jr., MD

## 2019-12-19 NOTE — H&P (VIEW-ONLY)
I have seen the patient, reviewed the Student's history and physical, assessment and plan. I have personally interviewed and examined the patient at bedside and: agree with the findings.     Right diaphragmatic hernia in the mid dome.  This is likely traumatic as it is not a named hernia.  This is symptomatic and she has right mid back pain that has improved although she is taking nsaids regularly for it.  For laparoscopic repair with or without mesh. 23 hour stay.

## 2020-01-03 ENCOUNTER — TELEPHONE (OUTPATIENT)
Dept: SURGERY | Facility: CLINIC | Age: 55
End: 2020-01-03

## 2020-01-03 RX ORDER — LISINOPRIL 5 MG/1
5 TABLET ORAL DAILY
COMMUNITY
End: 2020-07-17

## 2020-01-03 NOTE — PRE-PROCEDURE INSTRUCTIONS
PreOp Instructions given:     - Verbal medication information (what to hold and what to take)   - NPO guidelines   - Arrival place directions given; ARRIVAL TO [place] [address]  - Bathing with antibacterial soap   - Don't wear any jewelry or bring any valuables AM of surgery   - No makeup or moisturizer to face   - No perfume/cologne, powder, lotions or aftershave   Pt. verbalized understanding.   Pt denies any h/o Anesthesia/Sedation complications or side effects.    ARRIVAL TO [place] [address]  PT'S  - [facility] WILL BE PROVIDING TRANSPORTATION HOME UPON DISCHARGE.

## 2020-01-03 NOTE — TELEPHONE ENCOUNTER
----- Message from Santy Brooks sent at 1/3/2020 11:59 AM CST -----  Contact: Patient  Returning a call     Caller name:: Pt     Who Left Message for Patient:: Jose    Communication preference:: [phone]    Additional info::

## 2020-01-06 ENCOUNTER — ANESTHESIA (OUTPATIENT)
Dept: SURGERY | Facility: HOSPITAL | Age: 55
End: 2020-01-06
Payer: COMMERCIAL

## 2020-01-06 ENCOUNTER — ANESTHESIA EVENT (OUTPATIENT)
Dept: SURGERY | Facility: HOSPITAL | Age: 55
End: 2020-01-06
Payer: COMMERCIAL

## 2020-01-06 ENCOUNTER — HOSPITAL ENCOUNTER (OUTPATIENT)
Facility: HOSPITAL | Age: 55
Discharge: HOME | End: 2020-01-07
Attending: SURGERY | Admitting: SURGERY
Payer: COMMERCIAL

## 2020-01-06 DIAGNOSIS — K44.9 DIAPHRAGMATIC HERNIA WITHOUT OBSTRUCTION AND WITHOUT GANGRENE: Primary | ICD-10-CM

## 2020-01-06 DIAGNOSIS — K44.9 DIAPHRAGMATIC HERNIA: ICD-10-CM

## 2020-01-06 LAB
B-HCG UR QL: NEGATIVE
CTP QC/QA: YES
POCT GLUCOSE: 118 MG/DL (ref 70–110)
POCT GLUCOSE: 124 MG/DL (ref 70–110)
POCT GLUCOSE: 134 MG/DL (ref 70–110)
POCT GLUCOSE: 146 MG/DL (ref 70–110)
POCT GLUCOSE: 178 MG/DL (ref 70–110)

## 2020-01-06 PROCEDURE — 25000003 PHARM REV CODE 250: Performed by: STUDENT IN AN ORGANIZED HEALTH CARE EDUCATION/TRAINING PROGRAM

## 2020-01-06 PROCEDURE — 82962 GLUCOSE BLOOD TEST: CPT | Performed by: SURGERY

## 2020-01-06 PROCEDURE — 81025 URINE PREGNANCY TEST: CPT | Performed by: SURGERY

## 2020-01-06 PROCEDURE — 36000709 HC OR TIME LEV III EA ADD 15 MIN: Performed by: SURGERY

## 2020-01-06 PROCEDURE — 43280 PR LAP,ESOPHAGOGAST FUNDOPLASTY: ICD-10-PCS | Mod: ,,, | Performed by: SURGERY

## 2020-01-06 PROCEDURE — 63600175 PHARM REV CODE 636 W HCPCS: Performed by: SURGERY

## 2020-01-06 PROCEDURE — 63600175 PHARM REV CODE 636 W HCPCS: Performed by: STUDENT IN AN ORGANIZED HEALTH CARE EDUCATION/TRAINING PROGRAM

## 2020-01-06 PROCEDURE — 36000708 HC OR TIME LEV III 1ST 15 MIN: Performed by: SURGERY

## 2020-01-06 PROCEDURE — D9220A PRA ANESTHESIA: ICD-10-PCS | Mod: ,,, | Performed by: ANESTHESIOLOGY

## 2020-01-06 PROCEDURE — 63600175 PHARM REV CODE 636 W HCPCS: Performed by: ANESTHESIOLOGY

## 2020-01-06 PROCEDURE — 43280 LAPAROSCOPY FUNDOPLASTY: CPT | Mod: ,,, | Performed by: SURGERY

## 2020-01-06 PROCEDURE — 27201423 OPTIME MED/SURG SUP & DEVICES STERILE SUPPLY: Performed by: SURGERY

## 2020-01-06 PROCEDURE — 25000003 PHARM REV CODE 250: Performed by: ANESTHESIOLOGY

## 2020-01-06 PROCEDURE — 94761 N-INVAS EAR/PLS OXIMETRY MLT: CPT

## 2020-01-06 PROCEDURE — 37000009 HC ANESTHESIA EA ADD 15 MINS: Performed by: SURGERY

## 2020-01-06 PROCEDURE — 37000008 HC ANESTHESIA 1ST 15 MINUTES: Performed by: SURGERY

## 2020-01-06 PROCEDURE — 25000003 PHARM REV CODE 250: Performed by: SURGERY

## 2020-01-06 PROCEDURE — D9220A PRA ANESTHESIA: Mod: ,,, | Performed by: ANESTHESIOLOGY

## 2020-01-06 PROCEDURE — 71000033 HC RECOVERY, INTIAL HOUR: Performed by: SURGERY

## 2020-01-06 RX ORDER — OXYCODONE HYDROCHLORIDE 10 MG/1
10 TABLET ORAL EVERY 6 HOURS PRN
Status: DISCONTINUED | OUTPATIENT
Start: 2020-01-06 | End: 2020-01-07 | Stop reason: HOSPADM

## 2020-01-06 RX ORDER — BUPIVACAINE HYDROCHLORIDE 2.5 MG/ML
INJECTION, SOLUTION EPIDURAL; INFILTRATION; INTRACAUDAL
Status: DISCONTINUED | OUTPATIENT
Start: 2020-01-06 | End: 2020-01-06 | Stop reason: HOSPADM

## 2020-01-06 RX ORDER — SODIUM CHLORIDE 0.9 % (FLUSH) 0.9 %
3 SYRINGE (ML) INJECTION
Status: DISCONTINUED | OUTPATIENT
Start: 2020-01-06 | End: 2020-01-06

## 2020-01-06 RX ORDER — ACETAMINOPHEN 500 MG
500 TABLET ORAL EVERY 6 HOURS PRN
Status: DISCONTINUED | OUTPATIENT
Start: 2020-01-06 | End: 2020-01-07 | Stop reason: HOSPADM

## 2020-01-06 RX ORDER — KETAMINE HYDROCHLORIDE 10 MG/ML
INJECTION, SOLUTION INTRAMUSCULAR; INTRAVENOUS
Status: DISCONTINUED | OUTPATIENT
Start: 2020-01-06 | End: 2020-01-06

## 2020-01-06 RX ORDER — ONDANSETRON 2 MG/ML
INJECTION INTRAMUSCULAR; INTRAVENOUS
Status: DISCONTINUED | OUTPATIENT
Start: 2020-01-06 | End: 2020-01-06

## 2020-01-06 RX ORDER — PANTOPRAZOLE SODIUM 40 MG/1
40 TABLET, DELAYED RELEASE ORAL DAILY
Status: DISCONTINUED | OUTPATIENT
Start: 2020-01-07 | End: 2020-01-07 | Stop reason: HOSPADM

## 2020-01-06 RX ORDER — CEFAZOLIN SODIUM 1 G/3ML
INJECTION, POWDER, FOR SOLUTION INTRAMUSCULAR; INTRAVENOUS
Status: DISCONTINUED | OUTPATIENT
Start: 2020-01-06 | End: 2020-01-06

## 2020-01-06 RX ORDER — PROPOFOL 10 MG/ML
VIAL (ML) INTRAVENOUS
Status: DISCONTINUED | OUTPATIENT
Start: 2020-01-06 | End: 2020-01-06

## 2020-01-06 RX ORDER — ACETAMINOPHEN 10 MG/ML
INJECTION, SOLUTION INTRAVENOUS
Status: DISCONTINUED | OUTPATIENT
Start: 2020-01-06 | End: 2020-01-06

## 2020-01-06 RX ORDER — LIDOCAINE HYDROCHLORIDE 10 MG/ML
1 INJECTION, SOLUTION EPIDURAL; INFILTRATION; INTRACAUDAL; PERINEURAL ONCE
Status: DISCONTINUED | OUTPATIENT
Start: 2020-01-06 | End: 2020-01-06

## 2020-01-06 RX ORDER — SODIUM CHLORIDE 9 MG/ML
INJECTION, SOLUTION INTRAVENOUS CONTINUOUS
Status: DISCONTINUED | OUTPATIENT
Start: 2020-01-06 | End: 2020-01-06

## 2020-01-06 RX ORDER — PHENYLEPHRINE HYDROCHLORIDE 10 MG/ML
INJECTION INTRAVENOUS
Status: DISCONTINUED | OUTPATIENT
Start: 2020-01-06 | End: 2020-01-06

## 2020-01-06 RX ORDER — FENTANYL CITRATE 50 UG/ML
INJECTION, SOLUTION INTRAMUSCULAR; INTRAVENOUS
Status: DISCONTINUED | OUTPATIENT
Start: 2020-01-06 | End: 2020-01-06

## 2020-01-06 RX ORDER — LIDOCAINE HCL/PF 100 MG/5ML
SYRINGE (ML) INTRAVENOUS
Status: DISCONTINUED | OUTPATIENT
Start: 2020-01-06 | End: 2020-01-06

## 2020-01-06 RX ORDER — HYDROMORPHONE HYDROCHLORIDE 1 MG/ML
0.2 INJECTION, SOLUTION INTRAMUSCULAR; INTRAVENOUS; SUBCUTANEOUS EVERY 5 MIN PRN
Status: DISCONTINUED | OUTPATIENT
Start: 2020-01-06 | End: 2020-01-06 | Stop reason: HOSPADM

## 2020-01-06 RX ORDER — OXYCODONE HYDROCHLORIDE 5 MG/1
5 TABLET ORAL EVERY 6 HOURS PRN
Status: DISCONTINUED | OUTPATIENT
Start: 2020-01-06 | End: 2020-01-07 | Stop reason: HOSPADM

## 2020-01-06 RX ORDER — SUCCINYLCHOLINE CHLORIDE 20 MG/ML
INJECTION INTRAMUSCULAR; INTRAVENOUS
Status: DISCONTINUED | OUTPATIENT
Start: 2020-01-06 | End: 2020-01-06

## 2020-01-06 RX ORDER — ROCURONIUM BROMIDE 10 MG/ML
INJECTION, SOLUTION INTRAVENOUS
Status: DISCONTINUED | OUTPATIENT
Start: 2020-01-06 | End: 2020-01-06

## 2020-01-06 RX ORDER — ONDANSETRON 2 MG/ML
4 INJECTION INTRAMUSCULAR; INTRAVENOUS EVERY 6 HOURS PRN
Status: DISCONTINUED | OUTPATIENT
Start: 2020-01-06 | End: 2020-01-07 | Stop reason: HOSPADM

## 2020-01-06 RX ORDER — DIPHENHYDRAMINE HCL 25 MG
25 CAPSULE ORAL EVERY 6 HOURS PRN
Status: DISCONTINUED | OUTPATIENT
Start: 2020-01-06 | End: 2020-01-07 | Stop reason: HOSPADM

## 2020-01-06 RX ORDER — SODIUM CHLORIDE 9 MG/ML
INJECTION, SOLUTION INTRAVENOUS CONTINUOUS
Status: DISCONTINUED | OUTPATIENT
Start: 2020-01-06 | End: 2020-01-07

## 2020-01-06 RX ORDER — MIDAZOLAM HYDROCHLORIDE 1 MG/ML
INJECTION INTRAMUSCULAR; INTRAVENOUS
Status: DISCONTINUED | OUTPATIENT
Start: 2020-01-06 | End: 2020-01-06

## 2020-01-06 RX ORDER — SODIUM CHLORIDE 9 MG/ML
INJECTION, SOLUTION INTRAVENOUS CONTINUOUS PRN
Status: DISCONTINUED | OUTPATIENT
Start: 2020-01-06 | End: 2020-01-06

## 2020-01-06 RX ORDER — DEXAMETHASONE SODIUM PHOSPHATE 4 MG/ML
INJECTION, SOLUTION INTRA-ARTICULAR; INTRALESIONAL; INTRAMUSCULAR; INTRAVENOUS; SOFT TISSUE
Status: DISCONTINUED | OUTPATIENT
Start: 2020-01-06 | End: 2020-01-06

## 2020-01-06 RX ORDER — CEFAZOLIN SODIUM 1 G/3ML
2 INJECTION, POWDER, FOR SOLUTION INTRAMUSCULAR; INTRAVENOUS
Status: DISCONTINUED | OUTPATIENT
Start: 2020-01-06 | End: 2020-01-06

## 2020-01-06 RX ADMIN — HYDROMORPHONE HYDROCHLORIDE 0.2 MG: 1 INJECTION, SOLUTION INTRAMUSCULAR; INTRAVENOUS; SUBCUTANEOUS at 10:01

## 2020-01-06 RX ADMIN — MINERAL OIL AND WHITE PETROLATUM 2 G: 150; 830 OINTMENT OPHTHALMIC at 08:01

## 2020-01-06 RX ADMIN — OXYCODONE HYDROCHLORIDE 10 MG: 10 TABLET ORAL at 10:01

## 2020-01-06 RX ADMIN — KETAMINE HYDROCHLORIDE 10 MG: 10 INJECTION, SOLUTION INTRAMUSCULAR; INTRAVENOUS at 09:01

## 2020-01-06 RX ADMIN — ROCURONIUM BROMIDE 10 MG: 10 INJECTION, SOLUTION INTRAVENOUS at 09:01

## 2020-01-06 RX ADMIN — DIPHENHYDRAMINE HYDROCHLORIDE 25 MG: 25 CAPSULE ORAL at 06:01

## 2020-01-06 RX ADMIN — PHENYLEPHRINE HYDROCHLORIDE 100 MCG: 10 INJECTION INTRAVENOUS at 09:01

## 2020-01-06 RX ADMIN — FENTANYL CITRATE 50 MCG: 50 INJECTION, SOLUTION INTRAMUSCULAR; INTRAVENOUS at 09:01

## 2020-01-06 RX ADMIN — ACETAMINOPHEN 500 MG: 500 TABLET ORAL at 05:01

## 2020-01-06 RX ADMIN — LIDOCAINE HYDROCHLORIDE 100 MG: 20 INJECTION, SOLUTION INTRAVENOUS at 08:01

## 2020-01-06 RX ADMIN — CEFAZOLIN 2 G: 330 INJECTION, POWDER, FOR SOLUTION INTRAMUSCULAR; INTRAVENOUS at 08:01

## 2020-01-06 RX ADMIN — SUCCINYLCHOLINE CHLORIDE 160 MG: 20 INJECTION, SOLUTION INTRAMUSCULAR; INTRAVENOUS at 08:01

## 2020-01-06 RX ADMIN — KETAMINE HYDROCHLORIDE 20 MG: 10 INJECTION, SOLUTION INTRAMUSCULAR; INTRAVENOUS at 08:01

## 2020-01-06 RX ADMIN — SODIUM CHLORIDE: 0.9 INJECTION, SOLUTION INTRAVENOUS at 07:01

## 2020-01-06 RX ADMIN — SODIUM CHLORIDE: 0.9 INJECTION, SOLUTION INTRAVENOUS at 10:01

## 2020-01-06 RX ADMIN — ONDANSETRON 4 MG: 2 INJECTION INTRAMUSCULAR; INTRAVENOUS at 09:01

## 2020-01-06 RX ADMIN — DEXAMETHASONE SODIUM PHOSPHATE 4 MG: 4 INJECTION, SOLUTION INTRAMUSCULAR; INTRAVENOUS at 09:01

## 2020-01-06 RX ADMIN — ACETAMINOPHEN 1000 MG: 10 INJECTION, SOLUTION INTRAVENOUS at 09:01

## 2020-01-06 RX ADMIN — OXYCODONE HYDROCHLORIDE 10 MG: 10 TABLET ORAL at 05:01

## 2020-01-06 RX ADMIN — PROPOFOL 150 MG: 10 INJECTION, EMULSION INTRAVENOUS at 08:01

## 2020-01-06 RX ADMIN — ONDANSETRON 4 MG: 2 INJECTION INTRAMUSCULAR; INTRAVENOUS at 06:01

## 2020-01-06 RX ADMIN — SODIUM CHLORIDE: 0.9 INJECTION, SOLUTION INTRAVENOUS at 08:01

## 2020-01-06 RX ADMIN — OXYCODONE HYDROCHLORIDE 10 MG: 10 TABLET ORAL at 11:01

## 2020-01-06 RX ADMIN — ROCURONIUM BROMIDE 35 MG: 10 INJECTION, SOLUTION INTRAVENOUS at 08:01

## 2020-01-06 RX ADMIN — MIDAZOLAM HYDROCHLORIDE 2 MG: 1 INJECTION, SOLUTION INTRAMUSCULAR; INTRAVENOUS at 08:01

## 2020-01-06 RX ADMIN — FENTANYL CITRATE 100 MCG: 50 INJECTION, SOLUTION INTRAMUSCULAR; INTRAVENOUS at 08:01

## 2020-01-06 RX ADMIN — SODIUM CHLORIDE, SODIUM GLUCONATE, SODIUM ACETATE, POTASSIUM CHLORIDE, MAGNESIUM CHLORIDE, SODIUM PHOSPHATE, DIBASIC, AND POTASSIUM PHOSPHATE: .53; .5; .37; .037; .03; .012; .00082 INJECTION, SOLUTION INTRAVENOUS at 09:01

## 2020-01-06 RX ADMIN — ROCURONIUM BROMIDE 5 MG: 10 INJECTION, SOLUTION INTRAVENOUS at 08:01

## 2020-01-06 RX ADMIN — SUGAMMADEX 200 MG: 100 INJECTION, SOLUTION INTRAVENOUS at 09:01

## 2020-01-06 NOTE — TRANSFER OF CARE
"Anesthesia Transfer of Care Note    Patient: Teresa Trujillo    Procedure(s) Performed: Procedure(s) (LRB):  LAPAROSCOPY, DIAGNOSTIC w/ possible right diaphgramatic hernia repair (Right)    Patient location: PACU    Anesthesia Type: general    Transport from OR: Transported from OR on 6-10 L/min O2 by face mask with adequate spontaneous ventilation    Post pain: adequate analgesia    Post assessment: no apparent anesthetic complications and tolerated procedure well    Post vital signs: stable    Level of consciousness: sedated    Nausea/Vomiting: no nausea/vomiting    Complications: none    Transfer of care protocol was followed      Last vitals:   Visit Vitals  BP (!) 144/75 (BP Location: Left arm, Patient Position: Lying)   Pulse 95   Temp 36.4 °C (97.5 °F) (Temporal)   Resp 16   Ht 5' 9" (1.753 m)   Wt 66.7 kg (147 lb)   SpO2 100%   Breastfeeding? No   BMI 21.71 kg/m²     "

## 2020-01-06 NOTE — BRIEF OP NOTE
Operative Note       Surgery Date: 1/6/2020     Surgeon(s) and Role:     * Alexander Trujillo MD - Primary     * Shahriar Wheeler MD - Resident - Assisting    Pre-op Diagnosis:  Diaphragmatic hernia without obstruction and without gangrene [K44.9]    Post-op Diagnosis:  Diaphragmatic hernia without obstruction and without gangrene [K44.9]    Procedure(s) (LRB):  LAPAROSCOPY, DIAGNOSTIC w/ possible right diaphgramatic hernia repair (Right)    Anesthesia: General    Procedure in Detail/Findings:  1.5 inch defect with separate smaller defect.  Repaired with an endostitch    Estimated Blood Loss: Minimal           Specimens (From admission, onward)    None        Implants: * No implants in log *           Disposition: PACU - hemodynamically stable.           Condition: Good    Attestation:  I was present and scrubbed for the entire procedure.

## 2020-01-06 NOTE — OP NOTE
DATE OF PROCEDURE: 1/6/2020    PRE OP DIAGNOSIS: Diaphragmatic hernia without obstruction and without gangrene [K44.9]    POST OP DIAGNOSIS: Diaphragmatic hernia without obstruction and without gangrene [K44.9]    PROCEDURE: Procedure(s) (LRB):  LAPAROSCOPY, DIAGNOSTIC w/ possible right diaphgramatic hernia repair (Right)    Surgeon(s) and Role:     * Aelxander Trujillo MD - Primary     * Shahriar Wheeler MD - Resident - Assisting    ANESTHESIA: General.     Procedure:    The patient was placed under general anesthesia and a splayed lip leg table with the legs abducted.  The abdomen was prepped and draped in usual manner.  Access to the peritoneum was gained through umbilicus using the Opti vu trocar under direct vision and pneumoperitoneum to 15 mm mercury with CO2 gas was obtained.  Three 5 mm trocars were placed subcostally under the right costal margin at midline midclavicular line and anterior axillary lines.  The midclavicular trocar was later replaced with a 10 mm trocar. A segment of liver was incarcerated through the diaphragmatic defect and was dissected free into the abdominal cavity with blunt and sharp dissection. There were 2 defects.  One was approximately 1-1/2 cm and the other was a smaller defect that was posterior and lateral to the primary defect.  Using a 0 permanent endo-stitch these were both repaired with running suture. Trocars were removed under direct vision prior to last trocar and pneumoperitoneum was allowed to escape the fascia of the navel was closed with 0 Vicryl and the skin incisions were infiltrated with Marcaine and closed with 4 0 plain catgut and reinforced with Dermabond.  The patient tolerated the procedure well and was brought to the recovery room in stable condition.  Sponge and needle counts were correct at the end of the case. Blood loss was minimal, complications none pathology none.    This dictation was done with voice recognition software.

## 2020-01-06 NOTE — ANESTHESIA PREPROCEDURE EVALUATION
2020  Pre-operative evaluation for Procedure(s) (LRB):  LAPAROSCOPY, DIAGNOSTIC w/ possible right diaphgramatic hernia repair (Right)    Teresa Trujillo is a 54 y.o. female     Patient Active Problem List   Diagnosis    Diaphragmatic hernia without obstruction and without gangrene    Diaphragmatic hernia       Review of patient's allergies indicates:   Allergen Reactions    Atorvastatin        Current Facility-Administered Medications on File Prior to Encounter   Medication Dose Route Frequency Provider Last Rate Last Dose    0.9%  NaCl infusion   Intravenous Continuous Juan Miguel Quinn Jr., MD        lidocaine (PF) 10 mg/ml (1%) injection 10 mg  1 mL Intradermal Once Juan Miguel Quinn Jr., MD         Current Outpatient Medications on File Prior to Encounter   Medication Sig Dispense Refill    lisinopril (PRINIVIL,ZESTRIL) 5 MG tablet Take 5 mg by mouth once daily.      metFORMIN (GLUCOPHAGE) 500 MG tablet Take 500 mg by mouth once daily.       naproxen (NAPROSYN) 500 MG tablet Take 1 tablet (500 mg total) by mouth 2 (two) times daily. 40 tablet 0    norethindrone-ethinyl estradiol-iron (MICROGESTIN FE1.5/30) 1.5 mg-30 mcg (21)/75 mg (7) tablet Take 1 tablet by mouth once daily.      pantoprazole (PROTONIX) 40 MG tablet Take 40 mg by mouth once daily.       oxyCODONE-acetaminophen (PERCOCET) 7.5-325 mg per tablet Take 1 tablet by mouth every 6 (six) hours as needed. (Patient not taking: Reported on 2019) 20 tablet 0       Past Surgical History:   Procedure Laterality Date     SECTION      CHOLECYSTECTOMY      LAPAROSCOPIC CHOLECYSTECTOMY N/A 2019    Procedure: CHOLECYSTECTOMY, LAPAROSCOPIC;  Surgeon: Juan Miguel Quinn Jr., MD;  Location: [facility];  Service: General;  Laterality: N/A;    LIVER BIOPSY         Social History     Socioeconomic History    Marital status:       Spouse name: Not on file    Number of children: Not on file    Years of education: Not on file    Highest education level: Not on file   Occupational History    Not on file   Social Needs    Financial resource strain: Not on file    Food insecurity:     Worry: Not on file     Inability: Not on file    Transportation needs:     Medical: Not on file     Non-medical: Not on file   Tobacco Use    Smoking status: Never Smoker    Smokeless tobacco: Never Used   Substance and Sexual Activity    Alcohol use: Yes     Comment: casually    Drug use: No    Sexual activity: Not on file   Lifestyle    Physical activity:     Days per week: Not on file     Minutes per session: Not on file    Stress: Not on file   Relationships    Social connections:     Talks on phone: Not on file     Gets together: Not on file     Attends Druze service: Not on file     Active member of club or organization: Not on file     Attends meetings of clubs or organizations: Not on file     Relationship status: Not on file   Other Topics Concern    Not on file   Social History Narrative    Not on file         CBC: No results for input(s): WBC, RBC, HGB, HCT, PLT, MCV, MCH, MCHC in the last 72 hours.    CMP: No results for input(s): NA, K, CL, CO2, BUN, CREATININE, GLU, MG, PHOS, CALCIUM, ALBUMIN, PROT, ALKPHOS, ALT, AST, BILITOT in the last 72 hours.    INR  No results for input(s): PT, INR, PROTIME, APTT in the last 72 hours.        Diagnostic Studies:      EKD Echo:  No results found for this or any previous visit.      Anesthesia Evaluation    I have reviewed the Patient Summary Reports.     I have reviewed the Medications.     Review of Systems  Anesthesia Hx:  History of prior surgery of interest to airway management or planning: Denies Family Hx of Anesthesia complications.   Denies Personal Hx of Anesthesia complications.       Physical Exam  General:  Well nourished    Airway/Jaw/Neck:  Airway Findings: Mouth Opening:  Normal Tongue: Normal  General Airway Assessment: Adult  Mallampati: II  Improves to I with phonation.  TM Distance: Normal, at least 6 cm      Dental:  Dental Findings: In tact   Chest/Lungs:  Chest/Lungs Findings: Clear to auscultation, Normal Respiratory Rate         Mental Status:  Mental Status Findings:  Cooperative, Alert and Oriented         Anesthesia Plan  Type of Anesthesia, risks & benefits discussed:  Anesthesia Type:  general  Patient's Preference:   Intra-op Monitoring Plan: standard ASA monitors  Intra-op Monitoring Plan Comments:   Post Op Pain Control Plan: multimodal analgesia  Post Op Pain Control Plan Comments:   Induction:   IV  Beta Blocker:  Patient is not currently on a Beta-Blocker (No further documentation required).       Informed Consent: Patient understands risks and agrees with Anesthesia plan.  Questions answered. Anesthesia consent signed with patient.  ASA Score: 3     Day of Surgery Review of History & Physical:    H&P update referred to the surgeon.         Ready For Surgery From Anesthesia Perspective.

## 2020-01-06 NOTE — NURSING TRANSFER
Nursing Transfer Note      1/6/2020     Transfer To: 544A    Transfer via bed    Transfer with IV pole     Transported by PCT    Medicines sent: none    Chart send with patient: Yes    Notified: spouse    Patient reassessed at: 1120

## 2020-01-06 NOTE — BRIEF OP NOTE
[facility]  Brief Operative Note    SUMMARY     Surgery Date: 1/6/2020     Surgeon(s) and Role:     * Alexander Trujillo MD - Primary     * Shahriar Wheeler MD - Resident - Assisting        Pre-op Diagnosis:  Diaphragmatic hernia without obstruction and without gangrene [K44.9]    Post-op Diagnosis:  Post-Op Diagnosis Codes:     * Diaphragmatic hernia without obstruction and without gangrene [K44.9]    Procedure(s) (LRB):  LAPAROSCOPY, DIAGNOSTIC w/ possible right diaphgramatic hernia repair (Right)    Anesthesia: General    Description of Procedure: Primary closure of right-sided diaphragmatic hernia    Description of the findings of the procedure: Identified right diaphragmatic hernia in the mid dome. Primary closure of the defect was performed.      Estimated Blood Loss: * No values recorded between 1/6/2020  9:07 AM and 1/6/2020 10:03 AM *         Specimens:   Specimen (12h ago, onward)    None

## 2020-01-06 NOTE — PLAN OF CARE
Halima Laughlin MD     [id number] - [place], [place] - [address] AT [address] [place] [address]  [address]  [facility] [address]  Phone: [phone] Fax: [phone]    [id number] [place], [place] - [address] AT [facility] & [address]  [address]  [facility] [address]  Phone: [phone] Fax: [phone]    Payor: [email or web address] BLUE [employer] / Plan: BCBS ALL OUT OF STATE / Product Type: PPO /       01/06/20 1445   Discharge Assessment   Assessment Type Discharge Planning Assessment   Confirmed/corrected address and phone number on facesheet? Yes   Assessment information obtained from? Patient   Expected Length of Stay (days) 1   Communicated expected length of stay with patient/caregiver yes   Prior to hospitilization cognitive status: Alert/Oriented   Prior to hospitalization functional status: Independent   Current cognitive status: Alert/Oriented   Current Functional Status: Independent   Lives With spouse   Is patient able to care for self after discharge? Yes   Who are your caregiver(s) and their phone number(s)? Carlos Trujillo-Spouse    [phone]   Patient's perception of discharge disposition home or selfcare   Readmission Within the Last 30 Days no previous admission in last 30 days   Patient currently being followed by outpatient case management? No   Patient currently receives any other outside agency services? No   Equipment Currently Used at Home glucometer   Do you have any problems affording any of your prescribed medications? No   Is the patient taking medications as prescribed? yes   Does the patient have transportation home? Yes   Transportation Anticipated family or friend will provide   Dialysis Name and Scheduled days N/A   Does the patient receive services at the Coumadin Clinic? No   Discharge Plan A Home with family   Discharge Plan B Home   DME Needed Upon Discharge  none    Patient/Family in Agreement with Plan yes

## 2020-01-06 NOTE — INTERVAL H&P NOTE
The patient has been examined and the H&P has been reviewed:    I concur with the findings and no changes have occurred since H&P was written.    Anesthesia/Surgery risks, benefits and alternative options discussed and understood by patient/family.          Active Hospital Problems    Diagnosis  POA    Diaphragmatic hernia [K44.9]  Yes      Resolved Hospital Problems   No resolved problems to display.

## 2020-01-06 NOTE — ANESTHESIA POSTPROCEDURE EVALUATION
Anesthesia Post Evaluation    Patient: Teresa Trujillo    Procedure(s) Performed: Procedure(s) (LRB):  LAPAROSCOPY, DIAGNOSTIC w/ possible right diaphgramatic hernia repair (Right)    Final Anesthesia Type: general    Patient location during evaluation: PACU  Patient participation: Yes- Able to Participate  Level of consciousness: awake and alert and oriented  Post-procedure vital signs: reviewed and stable  Pain management: adequate  Airway patency: patent  FERMIN mitigation strategies: Intraoperative administration of CPAP, nasopharyngeal airway, or oral appliance during sedation, Multimodal analgesia, Extubation while patient is awake, Verification of full reversal of neuromuscular block and Extubation and recovery carried out in lateral, semiupright, or other nonsupine position  PONV status at discharge: No PONV  Anesthetic complications: no      Cardiovascular status: hemodynamically stable  Respiratory status: unassisted  Hydration status: euvolemic  Follow-up not needed.          Vitals Value Taken Time   /72 1/6/2020 11:40 AM   Temp 36.2 °C (97.1 °F) 1/6/2020 11:40 AM   Pulse 73 1/6/2020 11:40 AM   Resp 17 1/6/2020 11:40 AM   SpO2 95 % 1/6/2020 11:40 AM         Event Time     Out of Recovery 11:00:00          Pain/Samantha Score: Pain Rating Prior to Med Admin: 8 (1/6/2020 10:55 AM)  Samantha Score: 9 (1/6/2020 11:15 AM)

## 2020-01-07 VITALS
DIASTOLIC BLOOD PRESSURE: 82 MMHG | OXYGEN SATURATION: 97 % | TEMPERATURE: 98 F | HEART RATE: 72 BPM | RESPIRATION RATE: 18 BRPM | WEIGHT: 147 LBS | SYSTOLIC BLOOD PRESSURE: 153 MMHG | BODY MASS INDEX: 21.77 KG/M2 | HEIGHT: 69 IN

## 2020-01-07 LAB
ANION GAP SERPL CALC-SCNC: 6 MMOL/L (ref 8–16)
BASOPHILS # BLD AUTO: 0.02 K/UL (ref 0–0.2)
BASOPHILS NFR BLD: 0.3 % (ref 0–1.9)
BUN SERPL-MCNC: 6 MG/DL (ref 6–20)
CALCIUM SERPL-MCNC: 8.6 MG/DL (ref 8.7–10.5)
CHLORIDE SERPL-SCNC: 105 MMOL/L (ref 95–110)
CO2 SERPL-SCNC: 26 MMOL/L (ref 23–29)
CREAT SERPL-MCNC: 0.6 MG/DL (ref 0.5–1.4)
DIFFERENTIAL METHOD: ABNORMAL
EOSINOPHIL # BLD AUTO: 0 K/UL (ref 0–0.5)
EOSINOPHIL NFR BLD: 0 % (ref 0–8)
ERYTHROCYTE [DISTWIDTH] IN BLOOD BY AUTOMATED COUNT: 15.1 % (ref 11.5–14.5)
EST. GFR  (AFRICAN AMERICAN): >60 ML/MIN/1.73 M^2
EST. GFR  (NON AFRICAN AMERICAN): >60 ML/MIN/1.73 M^2
GLUCOSE SERPL-MCNC: 103 MG/DL (ref 70–110)
HCT VFR BLD AUTO: 29.9 % (ref 37–48.5)
HGB BLD-MCNC: 9.9 G/DL (ref 12–16)
IMM GRANULOCYTES # BLD AUTO: 0.01 K/UL (ref 0–0.04)
IMM GRANULOCYTES NFR BLD AUTO: 0.2 % (ref 0–0.5)
LYMPHOCYTES # BLD AUTO: 1.9 K/UL (ref 1–4.8)
LYMPHOCYTES NFR BLD: 29.1 % (ref 18–48)
MAGNESIUM SERPL-MCNC: 1.9 MG/DL (ref 1.6–2.6)
MCH RBC QN AUTO: 32.2 PG (ref 27–31)
MCHC RBC AUTO-ENTMCNC: 33.1 G/DL (ref 32–36)
MCV RBC AUTO: 97 FL (ref 82–98)
MONOCYTES # BLD AUTO: 0.8 K/UL (ref 0.3–1)
MONOCYTES NFR BLD: 11.6 % (ref 4–15)
NEUTROPHILS # BLD AUTO: 3.8 K/UL (ref 1.8–7.7)
NEUTROPHILS NFR BLD: 58.8 % (ref 38–73)
NRBC BLD-RTO: 0 /100 WBC
PHOSPHATE SERPL-MCNC: 2.2 MG/DL (ref 2.7–4.5)
PLATELET # BLD AUTO: 208 K/UL (ref 150–350)
PMV BLD AUTO: 12.9 FL (ref 9.2–12.9)
POTASSIUM SERPL-SCNC: 3.7 MMOL/L (ref 3.5–5.1)
RBC # BLD AUTO: 3.07 M/UL (ref 4–5.4)
SODIUM SERPL-SCNC: 137 MMOL/L (ref 136–145)
WBC # BLD AUTO: 6.53 K/UL (ref 3.9–12.7)

## 2020-01-07 PROCEDURE — 84100 ASSAY OF PHOSPHORUS: CPT

## 2020-01-07 PROCEDURE — 83735 ASSAY OF MAGNESIUM: CPT

## 2020-01-07 PROCEDURE — 25000003 PHARM REV CODE 250: Performed by: STUDENT IN AN ORGANIZED HEALTH CARE EDUCATION/TRAINING PROGRAM

## 2020-01-07 PROCEDURE — 85025 COMPLETE CBC W/AUTO DIFF WBC: CPT

## 2020-01-07 PROCEDURE — 36415 COLL VENOUS BLD VENIPUNCTURE: CPT

## 2020-01-07 PROCEDURE — 80048 BASIC METABOLIC PNL TOTAL CA: CPT

## 2020-01-07 RX ORDER — OXYCODONE AND ACETAMINOPHEN 5; 325 MG/1; MG/1
1 TABLET ORAL EVERY 4 HOURS PRN
Qty: 15 TABLET | Refills: 0 | Status: SHIPPED | OUTPATIENT
Start: 2020-01-07 | End: 2021-10-14

## 2020-01-07 RX ADMIN — ACETAMINOPHEN 500 MG: 500 TABLET ORAL at 12:01

## 2020-01-07 RX ADMIN — PANTOPRAZOLE SODIUM 40 MG: 40 TABLET, DELAYED RELEASE ORAL at 09:01

## 2020-01-07 RX ADMIN — OXYCODONE HYDROCHLORIDE 10 MG: 10 TABLET ORAL at 06:01

## 2020-01-07 RX ADMIN — ACETAMINOPHEN 500 MG: 500 TABLET ORAL at 06:01

## 2020-01-07 RX ADMIN — OXYCODONE HYDROCHLORIDE 10 MG: 10 TABLET ORAL at 12:01

## 2020-01-07 NOTE — PLAN OF CARE
01/07/20 1238   Post-Acute Status   Post-Acute Authorization Placement;Other   Post-Acute Placement Status Set-up Complete

## 2020-01-07 NOTE — PLAN OF CARE
01/07/20 1008   Post-Acute Status   Post-Acute Authorization Placement   Post-Acute Placement Status Awaiting Internal Medical Clearance     Patient is not medically ready at this time, Post acute needs to be determined. SW will continue to follow up.    Siena Shukla LMSW  [facility]   [id number]

## 2020-01-07 NOTE — SUBJECTIVE & OBJECTIVE
Interval History: no acute events overnight. Doing very well, tolerating clears no nausea, pain well controlled.    Medications:  Continuous Infusions:  Scheduled Meds:   pantoprazole  40 mg Oral Daily     PRN Meds:acetaminophen, diphenhydrAMINE, ondansetron, oxyCODONE, oxyCODONE     Review of patient's allergies indicates:   Allergen Reactions    Atorvastatin      Objective:     Vital Signs (Most Recent):  Temp: 98.7 °F (37.1 °C) (01/07/20 0557)  Pulse: 74 (01/07/20 0557)  Resp: 16 (01/07/20 0557)  BP: (!) 151/74 (01/07/20 0557)  SpO2: 95 % (01/07/20 0557) Vital Signs (24h Range):  Temp:  [97.1 °F (36.2 °C)-98.7 °F (37.1 °C)] 98.7 °F (37.1 °C)  Pulse:  [70-95] 74  Resp:  [13-17] 16  SpO2:  [93 %-100 %] 95 %  BP: (131-168)/(65-81) 151/74     Weight: 66.7 kg (147 lb)  Body mass index is 21.71 kg/m².    Intake/Output - Last 3 Shifts       01/05 0700 - 01/06 0659 01/06 0700 - 01/07 0659 01/07 0700 - 01/08 0659    P.O.  120     I.V. (mL/kg)  1000 (15)     Total Intake(mL/kg)  1120 (16.8)     Net  +1120                  Physical Exam   Constitutional: She is oriented to person, place, and time. She appears well-developed and well-nourished. No distress.   Cardiovascular: Normal rate.   Pulmonary/Chest: Effort normal.   Abdominal: Soft. She exhibits no distension. There is tenderness (appropriate surgical. incisions covered with dermabond).   Musculoskeletal: Normal range of motion.   Neurological: She is alert and oriented to person, place, and time.   Skin: Skin is warm and dry.   Nursing note and vitals reviewed.      Significant Labs:  CBC:   Recent Labs   Lab 01/07/20  0343   WBC 6.53   RBC 3.07*   HGB 9.9*   HCT 29.9*      MCV 97   MCH 32.2*   MCHC 33.1     CMP:   Recent Labs   Lab 01/07/20  0343      CALCIUM 8.6*      K 3.7   CO2 26      BUN 6   CREATININE 0.6       Significant Diagnostics:  CXR pending

## 2020-01-07 NOTE — ASSESSMENT & PLAN NOTE
54 y.o. female 1 Day Post-Op for Procedure(s) (LRB):  LAPAROSCOPY, DIAGNOSTIC w/ possible right diaphgramatic hernia repair (Right)    Doing well  Likely d/c home after CXR this morning

## 2020-01-07 NOTE — DISCHARGE SUMMARY
[facility]  General Surgery  Discharge Summary      Patient Name: Teresa Trujillo  MRN: [id number]  Admission Date: 1/6/2020  Hospital Length of Stay: 0 days  Discharge Date and Time:  01/07/2020 1300  Attending Physician: Alexander Trujillo MD   Discharging Provider: Lars Ndiaye MD  Primary Care Provider: Halima Laughlin MD     HPI:   Ms. Trujillo is a 53 yo female here in clinic today for diaphragmatic hernia consult. Patient states that on thanksgiving day after dinner she began having severe back pain, rated as a 13/10 in middle of her back mostly on the right side. Pain did not radiate anywhere else. Pain did not subside and so she went to ED on 12/2 where they did a CT with contrast which showed a diaphragmatic hernia.  She states she also had a CT without contrast done to rule out kidney stones.     Procedure(s) (LRB):  LAPAROSCOPY, DIAGNOSTIC w/ possible right diaphgramatic hernia repair (Right)     Hospital Course: Patient presented to [facility] with a right diaphragmatic hernia. She underwent: Procedure(s) (LRB):  LAPAROSCOPY, DIAGNOSTIC w/ possible right diaphgramatic hernia repair (Right). She tolerated the procedure well and was transferred to the floor after recovery from anesthesia. Please see the operative note for further procedure details. Vitals remained stable, and she was afebrile all throughout the post operative period. Labs were reviewed and electrolytes were replaced appropriately. Physical exam was appropriate for post operative state.  Diet was advanced, and she was able to tolerate a regular diet prior to discharge. She was ambulating without difficulty and had normal bowel function prior to discharge. Patient was deemed suitable for discharge on 1 Day Post-Op. She was discharged in good condition after a formal chest x ray was performed and reviewed with medications and instructions as below. She voiced understanding of the instructions prior to discharge.     For  more thorough information, please refer to the hospital record and operative report.      PEX:  A&O, NAD  RRR  No Resp Distress SARITA  ABD: appTTP, incisions c/d/i with dermabond. Non distended     Consults:     Significant Diagnostic Studies: CXR    Pending Diagnostic Studies:     Procedure Component Value Units Date/Time    X-Ray Chest PA And Lateral [[id number]]     Order Status:  Sent Lab Status:  No result         Final Active Diagnoses:    Diagnosis Date Noted POA    PRINCIPAL PROBLEM:  Diaphragmatic hernia [K44.9] 01/06/2020 Yes      Problems Resolved During this Admission:      Discharged Condition: good    Disposition:     Follow Up:  Follow-up Information     Alexander Trujillo MD. Schedule an appointment as soon as possible for a visit in 2 weeks.    Specialties:  General Surgery, Bariatrics  Why:  For wound re-check  Contact information:  [address]  [facility] [address]  [phone]                 Patient Instructions:      Call MD for:  temperature >100.4     Call MD for:  persistent nausea and vomiting or diarrhea     Call MD for:  severe uncontrolled pain     Call MD for:  difficulty breathing or increased cough     Lifting restrictions   Order Comments: Nothing over 10Lbs for the next 6 weeks     No dressing needed   Order Comments: Surgical glue was applied to your incision, it will wear off on its own over the next 2 weeks.     Shower on day dressing removed (No bath)   Order Comments: You can start showering 24 hours after surgery, avoid soaking in tub or pool for at least 2 weeks     Medications:  Reconciled Home Medications:      Medication List      START taking these medications    oxyCODONE-acetaminophen 5-325 mg per tablet  Commonly known as:  PERCOCET  Take 1 tablet by mouth every 4 (four) hours as needed for Pain.  Replaces:  oxyCODONE-acetaminophen 7.5-325 mg per tablet        CONTINUE taking these medications    lisinopril 5 MG tablet  Commonly known as:   PRINIVIL,ZESTRIL  Take 5 mg by mouth once daily.     metFORMIN 500 MG tablet  Commonly known as:  GLUCOPHAGE  Take 500 mg by mouth once daily.     naproxen 500 MG tablet  Commonly known as:  NAPROSYN  Take 1 tablet (500 mg total) by mouth 2 (two) times daily.     norethindrone-ethinyl estradiol-iron 1.5 mg-30 mcg (21)/75 mg (7) tablet  Commonly known as:  MICROGESTIN FE1.5/30  Take 1 tablet by mouth once daily.     pantoprazole 40 MG tablet  Commonly known as:  PROTONIX  Take 40 mg by mouth once daily.        STOP taking these medications    oxyCODONE-acetaminophen 7.5-325 mg per tablet  Commonly known as:  PERCOCET  Replaced by:  oxyCODONE-acetaminophen 5-325 mg per tablet            Lars Ndiaye MD  General Surgery  [facility]

## 2020-01-07 NOTE — PLAN OF CARE
Plan of care reviewed with pt. Pt aox4, VS as charted. Purposeful rounding for pt care and safety. Pain controlled with PRN medication. No reports of NV. Lap sites w/dermabond CDI. No falls/injury reported this shift. SCD in place. Safety precautions maintained - bed in low position, call light in reach, side rails up x2.

## 2020-01-07 NOTE — PLAN OF CARE
01/07/20 1238   Post-Acute Status   Post-Acute Authorization Placement;Other   Other Status No Post-Acute Service Needs     Patient expected to discharge home today with no needs.     Siena Shukla LMSW  [facility]   [id number]

## 2020-01-23 ENCOUNTER — OFFICE VISIT (OUTPATIENT)
Dept: SURGERY | Facility: CLINIC | Age: 55
End: 2020-01-23
Payer: COMMERCIAL

## 2020-01-23 VITALS
WEIGHT: 144 LBS | HEIGHT: 69 IN | SYSTOLIC BLOOD PRESSURE: 119 MMHG | DIASTOLIC BLOOD PRESSURE: 69 MMHG | BODY MASS INDEX: 21.33 KG/M2 | HEART RATE: 104 BPM

## 2020-01-23 DIAGNOSIS — Z09 POSTOP CHECK: Primary | ICD-10-CM

## 2020-01-23 PROBLEM — K44.9 DIAPHRAGMATIC HERNIA: Status: RESOLVED | Noted: 2020-01-06 | Resolved: 2020-01-23

## 2020-01-23 PROBLEM — K44.9 DIAPHRAGMATIC HERNIA WITHOUT OBSTRUCTION AND WITHOUT GANGRENE: Status: RESOLVED | Noted: 2019-12-19 | Resolved: 2020-01-23

## 2020-01-23 PROCEDURE — 99999 PR PBB SHADOW E&M-EST. PATIENT-LVL III: ICD-10-PCS | Mod: PBBFAC,,, | Performed by: SURGERY

## 2020-01-23 PROCEDURE — 99999 PR PBB SHADOW E&M-EST. PATIENT-LVL III: CPT | Mod: PBBFAC,,, | Performed by: SURGERY

## 2020-01-23 PROCEDURE — 99024 PR POST-OP FOLLOW-UP VISIT: ICD-10-PCS | Mod: S$GLB,,, | Performed by: SURGERY

## 2020-01-23 PROCEDURE — 99024 POSTOP FOLLOW-UP VISIT: CPT | Mod: S$GLB,,, | Performed by: SURGERY

## 2020-01-23 RX ORDER — LISINOPRIL 5 MG/1
5 TABLET ORAL
COMMUNITY
Start: 2019-12-27 | End: 2020-07-17

## 2020-01-23 RX ORDER — LANCETS 33 GAUGE
EACH MISCELLANEOUS
Refills: 3 | COMMUNITY
Start: 2019-12-05 | End: 2021-02-17 | Stop reason: SDUPTHER

## 2020-01-23 RX ORDER — OMEPRAZOLE 20 MG/1
CAPSULE, DELAYED RELEASE ORAL
Refills: 0 | COMMUNITY
Start: 2019-10-31 | End: 2022-04-07

## 2020-01-23 RX ORDER — NORETHINDRONE ACETATE AND ETHINYL ESTRADIOL 1MG-20(21)
KIT ORAL
COMMUNITY
Start: 2019-01-24 | End: 2022-04-07

## 2020-01-23 RX ORDER — METFORMIN HYDROCHLORIDE 500 MG/1
250 TABLET ORAL
COMMUNITY
Start: 2019-12-27 | End: 2021-02-17 | Stop reason: ALTCHOICE

## 2020-01-23 NOTE — PROGRESS NOTES
I have seen the patient, reviewed the Resident's history and physical, assessment and plan. I have personally interviewed and examined the patient at bedside and: agree with the findings.     Doing well after diaphragmatic hernia repair.  Light duty until Feb 17 and follow up prn.

## 2020-01-23 NOTE — PROGRESS NOTES
Patient ID: Teresa Trujillo is a 54 y.o. female.    Chief Complaint: Follow-up      HPI:  Ms. Trujillo is a 53 yo female here in clinic today for diaphragmatic hernia consult. Patient states that on thanksgiving day after dinner she began having severe back pain, rated as a 13/10 in middle of her back mostly on the right side. Pain did not radiate anywhere else. Pain did not subside and so she went to ED on 12/2 where they did a CT with contrast which showed a hernia. Today pain is 2/10, managed with naproxen. She states she also had a CT without contrast done to rule out kidney stones.    Interval Update 1/23/20: she is doing well postop. She notes incisional pain occasionally at two of her incisions, but otherwise denies significant abd pain. She is taking Tylenol BID currently. She is no longer having the R flank/back pain that she experienced preop. She denies any nausea, vomiting, diarrhea, or constipation. She is tolerating a regular diet.     Review of Systems   Constitutional: Negative for chills, fever and unexpected weight change.   HENT: Negative for sinus pressure, sinus pain, sore throat and trouble swallowing.    Respiratory: Negative for cough, choking and shortness of breath.    Cardiovascular: Negative for chest pain and palpitations.   Gastrointestinal: Positive for abdominal pain. Negative for blood in stool, constipation, diarrhea, nausea and vomiting.        Mild LUQ pain since lap trupti 11/2019   Genitourinary: Negative for difficulty urinating, dysuria, frequency and urgency.   Musculoskeletal: Positive for back pain.        Mid back pain on R side   Skin: Negative for color change.   Neurological: Positive for headaches. Negative for dizziness.        Has history migraines   Hematological: Does not bruise/bleed easily.       Current Outpatient Medications   Medication Sig Dispense Refill    blood sugar diagnostic (ONETOUCH VERIO) Strp USE TO TEST FOUR TIMES DAILY      lisinopril  (PRINIVIL,ZESTRIL) 5 MG tablet Take 5 mg by mouth.      metFORMIN (GLUCOPHAGE) 500 MG tablet Take 250 mg by mouth.      norethindrone-ethinyl estradiol (JUNEL FE , ,) 1 mg-20 mcg (21)/75 mg (7) per tablet       lisinopril (PRINIVIL,ZESTRIL) 5 MG tablet Take 5 mg by mouth once daily.      metFORMIN (GLUCOPHAGE) 500 MG tablet Take 500 mg by mouth once daily.       naproxen (NAPROSYN) 500 MG tablet Take 1 tablet (500 mg total) by mouth 2 (two) times daily. 40 tablet 0    norethindrone-ethinyl estradiol-iron (MICROGESTIN FE1.5/30) 1.5 mg-30 mcg (21)/75 mg (7) tablet Take 1 tablet by mouth once daily.      omeprazole (PRILOSEC) 20 MG capsule TK ONE C PO  BID FOR 14 DAYS  0    ONETOUCH DELICA PLUS LANCET 33 gauge Misc USE ONCE D.  3    oxyCODONE-acetaminophen (PERCOCET) 5-325 mg per tablet Take 1 tablet by mouth every 4 (four) hours as needed for Pain. 15 tablet 0    pantoprazole (PROTONIX) 40 MG tablet Take 40 mg by mouth once daily.        No current facility-administered medications for this visit.      Facility-Administered Medications Ordered in Other Visits   Medication Dose Route Frequency Provider Last Rate Last Dose    0.9%  NaCl infusion   Intravenous Continuous Juan Miguel Quinn Jr., MD        lidocaine (PF) 10 mg/ml (1%) injection 10 mg  1 mL Intradermal Once Juan Miguel Quinn Jr., MD           Review of patient's allergies indicates:   Allergen Reactions    Atorvastatin        Past Medical History:   Diagnosis Date    Diabetes mellitus, type 2     Hyperlipidemia     Migraine        Past Surgical History:   Procedure Laterality Date     SECTION      CHOLECYSTECTOMY      DIAGNOSTIC LAPAROSCOPY Right 2020    Procedure: LAPAROSCOPY, DIAGNOSTIC w/ possible right diaphgramatic hernia repair;  Surgeon: Alexander Trujillo MD;  Location: [facility] OR [address] [place] [address];  Service: General;  Laterality: Right;  tramatic diaphgramtic    LAPAROSCOPIC CHOLECYSTECTOMY N/A 2019    Procedure:  CHOLECYSTECTOMY, LAPAROSCOPIC;  Surgeon: Juan Miguel Quinn Jr., MD;  Location: [facility];  Service: General;  Laterality: N/A;    LIVER BIOPSY         Family History   Problem Relation Age of Onset    Heart disease Sister     Cancer Brother        Social History     Socioeconomic History    Marital status:      Spouse name: Not on file    Number of children: Not on file    Years of education: Not on file    Highest education level: Not on file   Occupational History    Not on file   Social Needs    Financial resource strain: Not on file    Food insecurity:     Worry: Not on file     Inability: Not on file    Transportation needs:     Medical: Not on file     Non-medical: Not on file   Tobacco Use    Smoking status: Never Smoker    Smokeless tobacco: Never Used   Substance and Sexual Activity    Alcohol use: Yes     Comment: casually    Drug use: No    Sexual activity: Not on file   Lifestyle    Physical activity:     Days per week: Not on file     Minutes per session: Not on file    Stress: Not on file   Relationships    Social connections:     Talks on phone: Not on file     Gets together: Not on file     Attends Hoahaoism service: Not on file     Active member of club or organization: Not on file     Attends meetings of clubs or organizations: Not on file     Relationship status: Not on file   Other Topics Concern    Not on file   Social History Narrative    Not on file       Vitals:    01/23/20 1452   BP: 119/69   Pulse: 104       Physical Exam   Constitutional: She is oriented to person, place, and time. She appears well-developed and well-nourished. She does not appear ill.   HENT:   Head: Normocephalic and atraumatic.   Cardiovascular: Normal rate, regular rhythm, normal heart sounds and intact distal pulses.   Pulmonary/Chest: Effort normal and breath sounds normal.   Abdominal: Soft. Bowel sounds are normal. There is no tenderness.   Incisions are c/d/i, overlying dermabond  No  surrounding erythema, drainage, dehiscence   Neurological: She is alert and oriented to person, place, and time. GCS eye subscore is 4. GCS verbal subscore is 5. GCS motor subscore is 6.   Skin: Skin is warm and dry. Capillary refill takes less than 2 seconds. No erythema.   Psychiatric: She has a normal mood and affect. Her behavior is normal.   Vitals reviewed.    Labs have been reviewed.    Imaging:  CT abdomen pelvis w/ contrast 12/2/19:  Impression:  Small amount of herniated liver into the right lower thoracic cavity.  Question any history of diaphragmatic trauma or injury.  Cholecystectomy  Moderately large colonic stool.    CT w/out contrast 12/6/19:  Impression:  No acute findings.  Unchanged diaphragmatic hernia with a portion of liver herniated into the right lower chest.  This was better evaluated on contrasted CT.      Assessment & Plan:   55 yo female with diaphragmatic hernia s/p lap diaphragmatic hernia repair on 1/7/20.   - Maintain light duty until 2/7/20  - F/u prn    Shahriar Wheeler MD  General Surgery Resident - PGY1  Pager: [phone]

## 2020-07-17 ENCOUNTER — LAB VISIT (OUTPATIENT)
Dept: LAB | Facility: OTHER | Age: 55
End: 2020-07-17
Attending: INTERNAL MEDICINE
Payer: COMMERCIAL

## 2020-07-17 ENCOUNTER — OFFICE VISIT (OUTPATIENT)
Dept: ENDOCRINOLOGY | Facility: CLINIC | Age: 55
End: 2020-07-17
Payer: COMMERCIAL

## 2020-07-17 VITALS
HEIGHT: 69 IN | DIASTOLIC BLOOD PRESSURE: 74 MMHG | SYSTOLIC BLOOD PRESSURE: 145 MMHG | WEIGHT: 150.81 LBS | BODY MASS INDEX: 22.34 KG/M2 | HEART RATE: 74 BPM | TEMPERATURE: 99 F

## 2020-07-17 DIAGNOSIS — E11.9 TYPE 2 DIABETES MELLITUS WITHOUT COMPLICATION, WITHOUT LONG-TERM CURRENT USE OF INSULIN: Primary | ICD-10-CM

## 2020-07-17 DIAGNOSIS — I10 ESSENTIAL HYPERTENSION: ICD-10-CM

## 2020-07-17 DIAGNOSIS — E11.9 TYPE 2 DIABETES MELLITUS WITHOUT COMPLICATION, WITHOUT LONG-TERM CURRENT USE OF INSULIN: ICD-10-CM

## 2020-07-17 LAB
ANION GAP SERPL CALC-SCNC: 11 MMOL/L (ref 8–16)
BUN SERPL-MCNC: 8 MG/DL (ref 6–20)
C PEPTIDE SERPL-MCNC: 0.9 NG/ML (ref 0.78–5.19)
CALCIUM SERPL-MCNC: 9 MG/DL (ref 8.7–10.5)
CHLORIDE SERPL-SCNC: 106 MMOL/L (ref 95–110)
CO2 SERPL-SCNC: 22 MMOL/L (ref 23–29)
CREAT SERPL-MCNC: 0.7 MG/DL (ref 0.5–1.4)
EST. GFR  (AFRICAN AMERICAN): >60 ML/MIN/1.73 M^2
EST. GFR  (NON AFRICAN AMERICAN): >60 ML/MIN/1.73 M^2
GLUCOSE SERPL-MCNC: 98 MG/DL (ref 70–110)
POTASSIUM SERPL-SCNC: 3.5 MMOL/L (ref 3.5–5.1)
SODIUM SERPL-SCNC: 139 MMOL/L (ref 136–145)
TSH SERPL DL<=0.005 MIU/L-ACNC: 0.66 UIU/ML (ref 0.4–4)

## 2020-07-17 PROCEDURE — 83036 HEMOGLOBIN GLYCOSYLATED A1C: CPT

## 2020-07-17 PROCEDURE — 36415 COLL VENOUS BLD VENIPUNCTURE: CPT

## 2020-07-17 PROCEDURE — 99203 PR OFFICE/OUTPT VISIT, NEW, LEVL III, 30-44 MIN: ICD-10-PCS | Mod: S$GLB,,, | Performed by: INTERNAL MEDICINE

## 2020-07-17 PROCEDURE — 84443 ASSAY THYROID STIM HORMONE: CPT

## 2020-07-17 PROCEDURE — 80048 BASIC METABOLIC PNL TOTAL CA: CPT

## 2020-07-17 PROCEDURE — 84681 ASSAY OF C-PEPTIDE: CPT

## 2020-07-17 PROCEDURE — 99203 OFFICE O/P NEW LOW 30 MIN: CPT | Mod: S$GLB,,, | Performed by: INTERNAL MEDICINE

## 2020-07-17 RX ORDER — LISINOPRIL 10 MG/1
10 TABLET ORAL DAILY
Qty: 90 TABLET | Refills: 3 | Status: SHIPPED | OUTPATIENT
Start: 2020-07-17 | End: 2021-02-17 | Stop reason: SDUPTHER

## 2020-07-17 NOTE — ASSESSMENT & PLAN NOTE
bp high today   - on pretty low dose medication   - increase to 10 mg   - encourage pt to keep f/u with PCP, monitor BP, may need additional adjustments

## 2020-07-17 NOTE — LETTER
July 17, 2020      Halima Laughlin MD  [address] [facility]  [place] [address]           [id number]  [address] [place] [address], [address] [address]  Phone: [phone]  Fax: [phone]          Patient: Teresa Trujillo   MR Number: [id number]   YOB: 1965   Date of Visit: 7/17/2020       Dear Dr. Halima Laughlin:    Thank you for referring Teresa Trujillo to me for evaluation. Attached you will find relevant portions of my assessment and plan of care.    This is probably the best controlled diabetes patient that I've been referred. When I saw her lab before meeting her was thinking stop metformin, start GLP1 to help with the weight, but she isn't even obese/overweight or anything. Hoping her repeat A1C is still good so she can stop the metformin. She does seem motivated, so trying to get her over to meet with my diabetes education folks to ideally set her up good diet habits to stay in the diet controlled diabetes range for a long time.    If you have questions, please do not hesitate to call me. I look forward to following Teresa Trujillo along with you.    Sincerely,    Keith De León MD    Enclosure  CC:  No Recipients

## 2020-07-17 NOTE — PROGRESS NOTES
Subjective:      Chief Complaint: Diabetes (type 2 ) and Establish Care      HPI: Teresa Trujillo is a 55 y.o. female who is here for an initial evaluation for diabetes.    With regards to the diabetes:  Diagnosed with T2DM since around 5 years ago. Prediabetes before that.    Known complications:     Retinopathy? No    Nephropathy? No    Neuropathy? In the past but not lately.    CAD? no    CVA? No    Current regimen:   Metformin 250 mg once a day (side effects with higher doses)    Missed doses? Not lately.    Prior treatments:    none     # times a day testin/day  Log reviewed: No    Pre breakfast: 110-130s    Hypoglycemic events? None     Lifestyle modification:   Exercise: not much lately, torn ligament in the leg lately.   Diet/typical meals: tries to work on portion size.     Soda: sometimes, maybe few per week.    Has HTN, on lisinopril 5 mg/day, hasn't taken it yet today.    Current symptoms: eye problems. Knee pains (left knee).   vision changes  Pt denies:   polyuria, polydipsia, nausea, vomit and diarrhea    Diabetes Management Status    Statin: Not taking  ACE/ARB: Taking    Screening or Prevention Patient's value Goal Complete/Controlled?   HgA1C Testing and Control   Last A1C 6.1 (2019) q6months/Less than 7% No, not recent   Lipid profile : 2019 Annually No   LDL control No results found for: LDLCALC Annually/Less than 100 mg/dl  No   Nephropathy screening No results found for: MICALBCREAT  Lab Results   Component Value Date    CREATININE 0.6 2020     Lab Results   Component Value Date    PROTEINUA Negative 2019    Annually Yes   Blood pressure BP Readings from Last 1 Encounters:   20 119/69    Less than 140/90 Yes   Dilated retinal exam Most Recent Eye Exam Date: Not Found Annually Yes   Foot exam   Most Recent Foot Exam Date: Not Found Annually Yes     Reviewed past medical, family, social history and updated as appropriate.    Review of Systems   Constitutional:  Negative for unexpected weight change.   HENT: Negative for trouble swallowing.    Eyes: Positive for visual disturbance.   Respiratory: Positive for cough (sometimes at night). Negative for shortness of breath.    Cardiovascular: Negative for palpitations.   Gastrointestinal: Negative for constipation and diarrhea.   Endocrine: Negative for polydipsia and polyuria.   Genitourinary: Negative for frequency.   Musculoskeletal:        Left knee pain   Neurological: Negative for light-headedness.   Psychiatric/Behavioral: Negative for sleep disturbance.     Objective:     Vitals:    07/17/20 1316   BP: (!) 145/74   Pulse: 74   Temp: 98.7 °F (37.1 °C)     BP Readings from Last 5 Encounters:   01/23/20 119/69   01/07/20 (!) 153/82   12/19/19 (!) 142/79   12/16/19 (!) 161/84   12/04/19 (!) 148/64     Physical Exam  Vitals signs reviewed.   Constitutional:       Appearance: She is well-developed.   HENT:      Head: Normocephalic and atraumatic.   Neck:      Musculoskeletal: Normal range of motion and neck supple.      Thyroid: No thyromegaly.   Cardiovascular:      Rate and Rhythm: Normal rate and regular rhythm.      Heart sounds: No murmur.   Pulmonary:      Effort: Pulmonary effort is normal.      Breath sounds: Normal breath sounds.   Abdominal:      General: There is no distension.      Palpations: Abdomen is soft. There is no mass.      Tenderness: There is no abdominal tenderness.   Musculoskeletal: Normal range of motion.         General: No tenderness.   Neurological:      Mental Status: She is alert and oriented to person, place, and time.   Psychiatric:         Judgment: Judgment normal.         Wt Readings from Last 5 Encounters:   01/23/20 1452 65.3 kg (144 lb)   01/06/20 0708 66.7 kg (147 lb)   12/19/19 1323 66.9 kg (147 lb 7.8 oz)   12/16/19 1055 68 kg (150 lb)   12/04/19 1429 68 kg (150 lb)     Lab Results   Component Value Date     01/07/2020    K 3.7 01/07/2020     01/07/2020    CO2 26  "01/07/2020     01/07/2020    BUN 6 01/07/2020    CREATININE 0.6 01/07/2020    CALCIUM 8.6 (L) 01/07/2020    PROT 8.7 (H) 12/02/2019    ALBUMIN 4.2 12/02/2019    BILITOT 0.5 12/02/2019    ALKPHOS 72 12/02/2019    AST 29 12/02/2019    ALT 10 12/02/2019    ANIONGAP 6 (L) 01/07/2020    ESTGFRAFRICA >60.0 01/07/2020    EGFRNONAA >60.0 01/07/2020      Assessment/Plan:     Type 2 diabetes mellitus without complication, without long-term current use of insulin  Pt reports hx diabetes for 5 years, prediabetes before   - no records of that available to review   - some information in the CareEverywhere section of Epic.   - highest A1C I see there is a 1x result up to 6.6, barely in the diabetes range, prediabetes range otherwise   - on metformin 250 mg once a day. Very low dose   - pt reports having a1C up into the 7s at one point, which certainly counts as diabetes so not really questioning the diagnosis at this point   - but am thinking she may be "diet controlled" diabetes at this time. Last A1C 6.1, though it's been a while   - recheck, if A1C still at goal, would try to stop metformin and recheck after 4-6 months or so to confirm stability off medication   - DM education evaluation to help with nutrition assessment, see about encouraging pt to have healthy diet to stay off medication   - encourage pt to keep f/u for eye/foot exams though      Essential hypertension  bp high today   - on pretty low dose medication   - increase to 10 mg   - encourage pt to keep f/u with PCP, monitor BP, may need additional adjustments        Follow up in about 6 months (around 1/17/2021) for lab review, further monitoring.      Keith De León MD  Endocrinology    "

## 2020-07-17 NOTE — PATIENT INSTRUCTIONS
"For the diabetes: will see how the A1C is looking. If still at goal/on the low side, you can most likely stop the metformin. In which case you would be in the "diet controlled" diabetes category.     For the blood pressure, increase lisinopril. 10 mg/day.      Healthy Meals for Diabetes     A healthcare provider will help you develop a meal plan that fits your needs.   Ask your healthcare team to help you make a meal plan that fits your needs. Your meal plan tells you when to eat your meals and snacks, what kinds of foods to eat, and how much of each food to eat. You dont have to give up all the foods you like. But you do need to follow some guidelines.  Choose healthy carbohydrates  Starches, sugars, and fiber are all types of carbohydrates. Fiber can help lower your cholesterol and triglycerides. Fiber is also healthy for your heart. You should have 20 to 35 grams of total fiber each day. Fiber-rich foods include:  · Whole-grain breads and cereals  · Bulgur wheat  · Brown rice     · Whole-wheat pasta  · Fruits and vegetables  · Dry beans, and peas   Keep track of the amount of carbohydrates you eat. This can help you keep the right balance of physical activity and medicine. The amount of carbohydrates needed will vary for each person. It depends on many things such as your health, the medicines you take, and how active you are. Your healthcare team will help you figure out the right amount of carbohydrates for you. You may start with around 45 to 60 grams of carbohydrates per meal, depending on your situation.   Here are some examples of foods containing about 15 grams of carbohydrates (1 serving of carbohydrates):  · 1/2 cup of canned or frozen fruit  · A small piece of fresh fruit (4 ounces)  · 1 slice of bread  · 1/2 cup of oatmeal  · 1/3 cup of rice  · 4 to 6 crackers  · 1/2 English muffin  · 1/2 cup of black beans  · 1/4 of a large baked potato (3 ounces)  · 2/3 cup of plain fat-free yogurt  · 1 cup of " soup  · 1/2 cup of casserole  · 6 chicken nuggets  · 2-inch-square brownie or cake without frosting  · 2 small cookies  · 1/2 cup of ice cream or sherbet  Choose healthy protein foods  Eating protein that is low in fat can help you control your weight. It also helps keep your heart healthy. Low-fat protein foods include:  · Fish  · Plant proteins, such as dry beans and peas, nuts, and soy products like tofu and soymilk  · Lean meat with all visible fat removed  · Poultry with the skin removed  · Low-fat or nonfat milk, cheese, and yogurt  Limit unhealthy fats and sugar  Saturated and trans fats are unhealthy for your heart. They raise LDL (bad) cholesterol. Fat is also high in calories, so it can make you gain weight. To cut down on unhealthy fats and sugar, limit these foods:  · Butter or margarine  · Palm and palm kernel oils and coconut oil  · Cream  · Cheese  · Herman  · Lunch meats     · Ice cream  · Sweet bakery goods such as pies, muffins, and donuts  · Jams and jellies  · Candy bars  · Regular sodas   How much to eat  The amount of food you eat affects your blood sugar. It also affects your weight. Your healthcare team will tell you how much of each type of food you should eat.  · Use measuring cups and spoons and a food scale to measure serving sizes.  · Learn what a correct serving size looks like on your plate. This will help when you are away from home and cant measure your servings.  · Eat only the number of servings given on your meal plan for each food. Dont take seconds.  · Learn to read food labels. Be sure to look at serving size, total carbohydrates, fiber, calories, sugar, and saturated and trans fats. Look for healthier alternatives to foods that have added sugar.  · Plan ahead for parties so you can still have a good time without going overboard with unhealthy food choices. Set a good example yourself by bringing a healthy dish to pot lucks.   Choose healthy snacks  When it comes to snacks, we  usually think about foods with added sugar and fats. But there are many other options for healthier snack choices. Here are a few snack ideas to choose from:  Snacks with less than 5 grams of carbohydrates  · 1 piece of string cheese  · 3 celery sticks plus 1 tablespoon of peanut butter  · 5 cherry tomatoes plus 1 tablespoon of ranch dressing  · 1 hard-boiled egg  · 1/4 cup of fresh blueberries  ·  5 baby carrots  · 1 cup of light popcorn  · 1/2 cup of sugar-free gelatin  · 15 almonds  Snacks with about 10 to 20 grams of carbohydrates  · 1/3 cup of hummus plus 1 cup of fresh cut nonstarchy vegetables (carrots, green peppers, broccoli, celery, or a combination)  · 1/2 cup of fresh or canned fruit plus 1/4 cup of cottage cheese  · 1/2 cup of tuna salad with 4 crackers  · 2 rice cakes and a tablespoon of peanut butter  · 1 small apple or orange  · 3 cups light popcorn  · 1/2 of a turkey sandwich (1 slice of whole-wheat bread, 2 ounces of turkey, and mustard)  Portion sizes are important to controlling your blood sugar and staying at a healthy weight. Stock up on healthy snack items so you always have them on hand.  When to eat  Your meal plan will likely include breakfast, lunch, dinner, and some snacks.  · Try to eat your meals and snacks at about the same times each day.  · Eat all your meals and snacks. Skipping a meal or snack can make your blood sugar drop too low. It can also cause you to eat too much at the next meal or snack. Then your blood sugar could get too high.  Date Last Reviewed: 7/1/2016  © 2210-8892 [employer]. [address] [place] [address], [place] [address]. All rights reserved. This information is not intended as a substitute for professional medical care. Always follow your healthcare professional's instructions.

## 2020-07-17 NOTE — ASSESSMENT & PLAN NOTE
"Pt reports hx diabetes for 5 years, prediabetes before   - no records of that available to review   - some information in the CareEverywhere section of Epic.   - highest A1C I see there is a 1x result up to 6.6, barely in the diabetes range, prediabetes range otherwise   - on metformin 250 mg once a day. Very low dose   - pt reports having a1C up into the 7s at one point, which certainly counts as diabetes so not really questioning the diagnosis at this point   - but am thinking she may be "diet controlled" diabetes at this time. Last A1C 6.1, though it's been a while   - recheck, if A1C still at goal, would try to stop metformin and recheck after 4-6 months or so to confirm stability off medication   - DM education evaluation to help with nutrition assessment, see about encouraging pt to have healthy diet to stay off medication   - encourage pt to keep f/u for eye/foot exams though    "

## 2020-07-18 LAB
ESTIMATED AVG GLUCOSE: 134 MG/DL (ref 68–131)
HBA1C MFR BLD HPLC: 6.3 % (ref 4–5.6)

## 2020-07-24 DIAGNOSIS — E11.9 TYPE 2 DIABETES MELLITUS WITHOUT COMPLICATION, WITHOUT LONG-TERM CURRENT USE OF INSULIN: Primary | ICD-10-CM

## 2020-07-24 NOTE — PROGRESS NOTES
Labs reviewed. A1C 6.3, cpeptide normal, glucose 98. Pt having issues with 250 mg/day metformin. Not sure how much that low a dose would really help. So will stop, see about DM education and just diet controlled DM  Please contact patient and let her know:   1. A1C was good, 6.3, below goal. Kidney, electrolytes were fine. Pancreas function looked normal.   2. Since A1C is below target, she can stop the metformin.   3. Recheck labs before next appointment (January 2020) to be sure sugar remains okay without the metformin. Or sooner if symptoms develop.    Thanks,   - Keith

## 2020-08-04 ENCOUNTER — CLINICAL SUPPORT (OUTPATIENT)
Dept: DIABETES | Facility: CLINIC | Age: 55
End: 2020-08-04
Payer: COMMERCIAL

## 2020-08-04 VITALS — HEIGHT: 69 IN | BODY MASS INDEX: 21.65 KG/M2 | WEIGHT: 146.19 LBS

## 2020-08-04 DIAGNOSIS — E11.9 TYPE 2 DIABETES MELLITUS WITHOUT COMPLICATION, WITHOUT LONG-TERM CURRENT USE OF INSULIN: ICD-10-CM

## 2020-08-04 PROCEDURE — G0108 PR DIAB MANAGE TRN  PER INDIV: ICD-10-PCS | Mod: ,,, | Performed by: FAMILY MEDICINE

## 2020-08-04 PROCEDURE — G0108 DIAB MANAGE TRN  PER INDIV: HCPCS | Mod: ,,, | Performed by: FAMILY MEDICINE

## 2020-08-04 PROCEDURE — 99213 OFFICE O/P EST LOW 20 MIN: CPT

## 2020-08-05 NOTE — PROGRESS NOTES
Diabetes Education  Author: Shanice Ogden RN  Date: 8/5/2020    Diabetes Care Management Summary  Diabetes Education Record Assessment/Progress: Initial  Current Diabetes Risk Level: Low         Diabetes Type  Diabetes Type : Type II    Diabetes History  Diabetes Diagnosis: 3-5 years  Current Treatment: Diet, Exercise  Reviewed Problem List with Patient: Yes    Health Maintenance was reviewed today with patient. Discussed with patient importance of routine eye exams, foot exams/foot care, blood work (i.e.: A1c, microalbumin, and lipid), dental visits, yearly flu vaccine, and pneumonia vaccine as indicated by PCP. Patient verbalized understanding.     Health Maintenance Topics with due status: Not Due       Topic Last Completion Date    Lipid Panel 02/26/2019    Influenza Vaccine 11/07/2019     Health Maintenance Due   Topic Date Due    Hepatitis C Screening  1965    HIV Screening  03/04/1980    TETANUS VACCINE  03/04/1983    Cervical Cancer Screening  03/04/1986    Mammogram  03/04/2005    Shingles Vaccine (1 of 2) 03/04/2015    Colorectal Cancer Screening  03/04/2015       Nutrition  Meal Plan 24 Hour Recall - Breakfast: 1/2 of biscuit - coffee (yesterday: oatmeal + toaster struedel - water)  Meal Plan 24 Hour Recall - Lunch: salad w/ cucumber + olives - coke zero  Meal Plan 24 Hour Recall - Dinner: baked chicken - water  Meal Plan 24 Hour Recall - Snack: piece of turkey neck    Monitoring   Self Monitoring : SMBG: once daily AM Fasting - 113, Max seen in past few weeks 135  Blood Glucose Logs: No  Do you use a personal continuous glucose monitor?: No  In the last month, how often have you had a low blood sugar reaction?: never    Exercise   Exercise Type: (having knee pain issues)  Frequency: Never    Current Diabetes Treatment   Current Treatment: Diet, Exercise    Social History  Preferred Learning Method: Face to Face, Reading Materials  Primary Support: Daughter, Spouse  Educational Level: Some  College  Occupation: recently retired  Smoking Status: Never a Smoker  Alcohol Use: Rarely                                Barriers to Change  Barriers to Change: None  Learning Challenges : None    Readiness to Learn   Readiness to Learn : Eager    Cultural Influences  Cultural Influences: No    Diabetes Education Assessment/Progress  Diabetes Disease Process (diabetes disease process and treatment options): Discussion, Individual Session, Requires Assistance Family/SO   Ed on what DM is, insulin resistance, beta cell burnout and importance of using lifestyle changes + appropriate medications to control BG - pt is doing trial w/o medication!!    Nutrition (Incorporating nutritional management into one's lifestyle): Discussion, Individual Session, Requires Assistance Family/SO, Written Materials Provided, Demonstration   Ed on the plate method: importance of increasing non-starchy veggies, choosing lean protein, healthy fats and portioned servings of complex cho's; label reading exercise demonstrating importance of reducing added sugar/eliminating sweet drinks     Physical Activity (incorporating physical activity into one's lifestyle): Not Covered/Deferred  Medications (states correct name, dose, onset, peak, duration, side effects & timing of meds): Discussion, Individual Session, Requires Assistance Family/SO, Comprehends Key Points   Pt has stopped taking metformin - no meds for now    Monitoring (monitoring blood glucose/other parameters & using results): Discussion, Individual Session, Requires Assistance Family/SO, Written Materials Provided    Ed on SMBG schedule, BG goals and how to trend progress of lifestyle changes    Acute Complications (preventing, detecting, and treating acute complications): Not Covered/Deferred  Chronic Complications (preventing, detecting, and treating chronic complications): Discussion, Individual Session, Requires Assistance Family/SO, Comprehends Key Points   Ed on current A1C,  goal A1C and importance of keeping BG well controlled to prevent complications r/t DM     Clinical (diabetes, other pertinent medical history, and relevant comorbidities reviewed during visit): Not Covered/Deferred  Cognitive (knowledge of self-management skills, functional health literacy): Discussion, Individual Session, Requires Assistance Family/SO   Pt very willing to learn more about diabetes, acknowledges family history and lack of understanding and wants to break the cycle in her family     Psychosocial (emotional response to diabetes): Discussion, Individual Session, Requires Assistance Family/SO     Diabetes Distress and Support Systems: Discussion, Individual Session, Requires Assistance Family/SO   Pt daughter also had gestational diabetes - working together to make changes to diet and work on keeping BG well controlled    Behavioral (readiness for change, lifestyle practices, self-care behaviors): Discussion, Individual Session, Requires Assistance Family/SO   Pt is very motivated to make lifestyle changes and continue monitoring BG to help keep diabetes well controlled     Goals  Patient has selected/evaluated goals during today's session: Yes, selected  Healthy Eating: Set(pt will find 2 sources of added sugar she consumes regularly and cut back on these - will restict cho's per meal to 30-45gm)  Start Date: 08/04/20  Monitoring: Set(will continue to smbg daily, but test some PP values to help track body's response to food)  Start Date: 08/04/20              Diabetes Meal Plan  Restrictions: Restricted Carbohydrate  Carbohydrate Per Meal: 30-45g    Today's Self-Management Care Plan was developed with the patient's input and is based on barriers identified during today's assessment.    The long and short-term goals in the care plan were written with the patient/caregiver's input. The patient has agreed to work toward these goals to improve her overall diabetes control.      The patient received a copy  of today's self-management plan and verbalized understanding of the care plan, goals, and all of today's instructions.      The patient was encouraged to communicate with her physician and care team regarding her condition(s) and treatment.  I provided the patient with my contact information today and encouraged her to contact me via phone or patient portal as needed.     Education Units of Time   Time Spent: 60 min

## 2020-09-22 ENCOUNTER — CLINICAL SUPPORT (OUTPATIENT)
Dept: DIABETES | Facility: CLINIC | Age: 55
End: 2020-09-22
Payer: COMMERCIAL

## 2020-09-22 VITALS — WEIGHT: 144.19 LBS | HEIGHT: 69 IN | BODY MASS INDEX: 21.36 KG/M2

## 2020-09-22 DIAGNOSIS — E11.9 TYPE 2 DIABETES MELLITUS WITHOUT COMPLICATION, WITHOUT LONG-TERM CURRENT USE OF INSULIN: Primary | ICD-10-CM

## 2020-09-22 PROCEDURE — G0108 DIAB MANAGE TRN  PER INDIV: HCPCS | Mod: ,,, | Performed by: FAMILY MEDICINE

## 2020-09-22 PROCEDURE — G0108 PR DIAB MANAGE TRN  PER INDIV: ICD-10-PCS | Mod: ,,, | Performed by: FAMILY MEDICINE

## 2020-09-22 PROCEDURE — 99213 OFFICE O/P EST LOW 20 MIN: CPT

## 2020-09-23 NOTE — PROGRESS NOTES
Diabetes Education  Author: Shanice Ogden RN  Date: 2020    Diabetes Care Management Summary  Diabetes Education Record Assessment/Progress: Comprehensive/Group  Current Diabetes Risk Level: Low              Diabetes History  Current Treatment: Diet, Exercise  Reviewed Problem List with Patient: Yes    Health Maintenance was reviewed today with patient. Discussed with patient importance of routine eye exams, foot exams/foot care, blood work (i.e.: A1c, microalbumin, and lipid), dental visits, yearly flu vaccine, and pneumonia vaccine as indicated by PCP. Patient verbalized understanding.     Health Maintenance Topics with due status: Not Due       Topic Last Completion Date    Lipid Panel 2019     Health Maintenance Due   Topic Date Due    Hepatitis C Screening  1965    HIV Screening  1980    TETANUS VACCINE  1983    Cervical Cancer Screening  1986    Mammogram  2005    Shingles Vaccine (1 of 2) 2015    Colorectal Cancer Screening  2015       Nutrition  Meal Plan 24 Hour Recall - Breakfast: 1 small biscuit, 2 scrambled eggs - water  Meal Plan 24 Hour Recall - Lunch: 4 onion rings, salad - water, coke zero  Meal Plan 24 Hour Recall - Dinner: [employer] (fried fish, oysters, shrimp - a few french fries) - coke zero  Meal Plan 24 Hour Recall - Snack: no snacks yesterday    Monitoring   Self Monitoring : SMBG: AM fastin (had deanies fried seafood last night) - usually in 110-120's  about 2hr PP: 120's (max after dinner 186 w/ treat meal)  Blood Glucose Logs: No  Do you use a personal continuous glucose monitor?: No  What are your symptoms of low blood sugar?: feels nauseated when in 90's         Current Diabetes Treatment   Current Treatment: Diet, Exercise       Diabetes Education Assessment/Progress  Diabetes Disease Process (diabetes disease process and treatment options): Not Covered/Deferred  Nutrition (Incorporating nutritional management  into one's lifestyle): Discussion, Individual Session, Comprehends Key Points, Written Materials Provided   Pt has been testing PP to help determine appropriate portion sizes + types of cho's     Physical Activity (incorporating physical activity into one's lifestyle): Discussion, Individual Session, Comprehends Key Points, Written Materials Provided   Reviewed important benefits of physical activity, and modifying to walking for knee pain     Medications (states correct name, dose, onset, peak, duration, side effects & timing of meds): Not Covered/Deferred  Monitoring (monitoring blood glucose/other parameters & using results): Discussion, Individual Session, Comprehends Key Points   Pt has been smbg about 1-2x/day and checking various meals to see body's reactions - reviewed BG goals     Acute Complications (preventing, detecting, and treating acute complications): Not Covered/Deferred  Chronic Complications (preventing, detecting, and treating chronic complications): Discussion, Individual Session, Comprehends Key Points    Clinical (diabetes, other pertinent medical history, and relevant comorbidities reviewed during visit): Not Covered/Deferred  Cognitive (knowledge of self-management skills, functional health literacy): Not Covered/Deferred  Psychosocial (emotional response to diabetes): Discussion, Individual Session, Comprehends Key Points  Diabetes Distress and Support Systems: Not Covered/Deferred  Behavioral (readiness for change, lifestyle practices, self-care behaviors): Discussion, Individual Session, Comprehends Key Points   Pt very motivated to work on keeping BG well controlled and to work towards getting a normal A1C    Goals  Patient has selected/evaluated goals during today's session: Yes, selected  Monitoring: Set(pt will keep PP BG <160 80% of readings per week)  Start Date: 09/22/20              Diabetes Meal Plan  Restrictions: Restricted Carbohydrate  Carbohydrate Per Meal: 30-45g    Today's  Self-Management Care Plan was developed with the patient's input and is based on barriers identified during today's assessment.    The long and short-term goals in the care plan were written with the patient/caregiver's input. The patient has agreed to work toward these goals to improve her overall diabetes control.      The patient received a copy of today's self-management plan and verbalized understanding of the care plan, goals, and all of today's instructions.      The patient was encouraged to communicate with her physician and care team regarding her condition(s) and treatment.  I provided the patient with my contact information today and encouraged her to contact me via phone or patient portal as needed.     Education Units of Time   Time Spent: 60 min

## 2020-10-12 ENCOUNTER — TELEPHONE (OUTPATIENT)
Dept: ENDOCRINOLOGY | Facility: CLINIC | Age: 55
End: 2020-10-12

## 2021-02-15 ENCOUNTER — LAB VISIT (OUTPATIENT)
Dept: LAB | Facility: OTHER | Age: 56
End: 2021-02-15
Attending: INTERNAL MEDICINE
Payer: COMMERCIAL

## 2021-02-15 DIAGNOSIS — E11.9 TYPE 2 DIABETES MELLITUS WITHOUT COMPLICATION, WITHOUT LONG-TERM CURRENT USE OF INSULIN: ICD-10-CM

## 2021-02-15 LAB
ANION GAP SERPL CALC-SCNC: 13 MMOL/L (ref 8–16)
BUN SERPL-MCNC: 9 MG/DL (ref 6–20)
CALCIUM SERPL-MCNC: 9.4 MG/DL (ref 8.7–10.5)
CHLORIDE SERPL-SCNC: 103 MMOL/L (ref 95–110)
CO2 SERPL-SCNC: 23 MMOL/L (ref 23–29)
CREAT SERPL-MCNC: 0.7 MG/DL (ref 0.5–1.4)
EST. GFR  (AFRICAN AMERICAN): >60 ML/MIN/1.73 M^2
EST. GFR  (NON AFRICAN AMERICAN): >60 ML/MIN/1.73 M^2
ESTIMATED AVG GLUCOSE: 126 MG/DL (ref 68–131)
GLUCOSE SERPL-MCNC: 95 MG/DL (ref 70–110)
HBA1C MFR BLD: 6 % (ref 4–5.6)
POTASSIUM SERPL-SCNC: 4.1 MMOL/L (ref 3.5–5.1)
SODIUM SERPL-SCNC: 139 MMOL/L (ref 136–145)

## 2021-02-15 PROCEDURE — 80048 BASIC METABOLIC PNL TOTAL CA: CPT

## 2021-02-15 PROCEDURE — 83036 HEMOGLOBIN GLYCOSYLATED A1C: CPT

## 2021-02-15 PROCEDURE — 36415 COLL VENOUS BLD VENIPUNCTURE: CPT

## 2021-02-17 ENCOUNTER — OFFICE VISIT (OUTPATIENT)
Dept: ENDOCRINOLOGY | Facility: CLINIC | Age: 56
End: 2021-02-17
Payer: COMMERCIAL

## 2021-02-17 VITALS
WEIGHT: 148.13 LBS | HEIGHT: 69 IN | HEART RATE: 81 BPM | OXYGEN SATURATION: 100 % | DIASTOLIC BLOOD PRESSURE: 74 MMHG | BODY MASS INDEX: 21.94 KG/M2 | SYSTOLIC BLOOD PRESSURE: 141 MMHG

## 2021-02-17 DIAGNOSIS — I10 ESSENTIAL HYPERTENSION: ICD-10-CM

## 2021-02-17 DIAGNOSIS — E11.9 TYPE 2 DIABETES MELLITUS WITHOUT COMPLICATION, WITHOUT LONG-TERM CURRENT USE OF INSULIN: ICD-10-CM

## 2021-02-17 PROCEDURE — 1126F AMNT PAIN NOTED NONE PRSNT: CPT | Mod: S$GLB,,, | Performed by: INTERNAL MEDICINE

## 2021-02-17 PROCEDURE — 1126F PR PAIN SEVERITY QUANTIFIED, NO PAIN PRESENT: ICD-10-PCS | Mod: S$GLB,,, | Performed by: INTERNAL MEDICINE

## 2021-02-17 PROCEDURE — 99214 PR OFFICE/OUTPT VISIT, EST, LEVL IV, 30-39 MIN: ICD-10-PCS | Mod: S$GLB,,, | Performed by: INTERNAL MEDICINE

## 2021-02-17 PROCEDURE — 99214 OFFICE O/P EST MOD 30 MIN: CPT | Mod: S$GLB,,, | Performed by: INTERNAL MEDICINE

## 2021-02-17 PROCEDURE — 3008F PR BODY MASS INDEX (BMI) DOCUMENTED: ICD-10-PCS | Mod: CPTII,S$GLB,, | Performed by: INTERNAL MEDICINE

## 2021-02-17 PROCEDURE — 3008F BODY MASS INDEX DOCD: CPT | Mod: CPTII,S$GLB,, | Performed by: INTERNAL MEDICINE

## 2021-02-17 RX ORDER — LISINOPRIL 20 MG/1
20 TABLET ORAL DAILY
Qty: 90 TABLET | Refills: 3 | Status: SHIPPED | OUTPATIENT
Start: 2021-02-17 | End: 2022-03-04

## 2021-02-17 RX ORDER — BLOOD-GLUCOSE METER
EACH MISCELLANEOUS
Qty: 100 STRIP | Refills: 11 | Status: SHIPPED | OUTPATIENT
Start: 2021-02-17 | End: 2022-04-11

## 2021-02-17 RX ORDER — LANCETS 33 GAUGE
EACH MISCELLANEOUS
Qty: 100 EACH | Refills: 11 | Status: SHIPPED | OUTPATIENT
Start: 2021-02-17 | End: 2022-09-09

## 2021-02-26 ENCOUNTER — IMMUNIZATION (OUTPATIENT)
Dept: INTERNAL MEDICINE | Facility: CLINIC | Age: 56
End: 2021-02-26
Payer: COMMERCIAL

## 2021-02-26 DIAGNOSIS — Z23 NEED FOR VACCINATION: Primary | ICD-10-CM

## 2021-02-26 PROCEDURE — 91300 COVID-19, MRNA, LNP-S, PF, 30 MCG/0.3 ML DOSE VACCINE: CPT | Mod: S$GLB,,, | Performed by: INTERNAL MEDICINE

## 2021-02-26 PROCEDURE — 0001A COVID-19, MRNA, LNP-S, PF, 30 MCG/0.3 ML DOSE VACCINE: CPT | Mod: CV19,S$GLB,, | Performed by: INTERNAL MEDICINE

## 2021-02-26 PROCEDURE — 91300 COVID-19, MRNA, LNP-S, PF, 30 MCG/0.3 ML DOSE VACCINE: ICD-10-PCS | Mod: S$GLB,,, | Performed by: INTERNAL MEDICINE

## 2021-02-26 PROCEDURE — 0001A COVID-19, MRNA, LNP-S, PF, 30 MCG/0.3 ML DOSE VACCINE: ICD-10-PCS | Mod: CV19,S$GLB,, | Performed by: INTERNAL MEDICINE

## 2021-03-19 ENCOUNTER — IMMUNIZATION (OUTPATIENT)
Dept: INTERNAL MEDICINE | Facility: CLINIC | Age: 56
End: 2021-03-19
Payer: COMMERCIAL

## 2021-03-19 DIAGNOSIS — Z23 NEED FOR VACCINATION: Primary | ICD-10-CM

## 2021-03-19 PROCEDURE — 91300 COVID-19, MRNA, LNP-S, PF, 30 MCG/0.3 ML DOSE VACCINE: CPT | Mod: PBBFAC | Performed by: INTERNAL MEDICINE

## 2021-03-19 PROCEDURE — 0002A COVID-19, MRNA, LNP-S, PF, 30 MCG/0.3 ML DOSE VACCINE: CPT | Mod: PBBFAC | Performed by: INTERNAL MEDICINE

## 2021-10-14 ENCOUNTER — OFFICE VISIT (OUTPATIENT)
Dept: NEUROLOGY | Facility: CLINIC | Age: 56
End: 2021-10-14
Payer: COMMERCIAL

## 2021-10-14 ENCOUNTER — TELEPHONE (OUTPATIENT)
Dept: NEUROLOGY | Facility: CLINIC | Age: 56
End: 2021-10-14

## 2021-10-14 VITALS
SYSTOLIC BLOOD PRESSURE: 122 MMHG | BODY MASS INDEX: 21.03 KG/M2 | HEART RATE: 93 BPM | HEIGHT: 69 IN | DIASTOLIC BLOOD PRESSURE: 79 MMHG | WEIGHT: 142 LBS

## 2021-10-14 DIAGNOSIS — G43.009 MIGRAINE WITHOUT AURA AND WITHOUT STATUS MIGRAINOSUS, NOT INTRACTABLE: Primary | ICD-10-CM

## 2021-10-14 PROCEDURE — 3008F BODY MASS INDEX DOCD: CPT | Mod: CPTII,S$GLB,, | Performed by: PSYCHIATRY & NEUROLOGY

## 2021-10-14 PROCEDURE — 99999 PR PBB SHADOW E&M-EST. PATIENT-LVL III: CPT | Mod: PBBFAC,,, | Performed by: PSYCHIATRY & NEUROLOGY

## 2021-10-14 PROCEDURE — 3044F PR MOST RECENT HEMOGLOBIN A1C LEVEL <7.0%: ICD-10-PCS | Mod: CPTII,S$GLB,, | Performed by: PSYCHIATRY & NEUROLOGY

## 2021-10-14 PROCEDURE — 3008F PR BODY MASS INDEX (BMI) DOCUMENTED: ICD-10-PCS | Mod: CPTII,S$GLB,, | Performed by: PSYCHIATRY & NEUROLOGY

## 2021-10-14 PROCEDURE — 99204 OFFICE O/P NEW MOD 45 MIN: CPT | Mod: S$GLB,,, | Performed by: PSYCHIATRY & NEUROLOGY

## 2021-10-14 PROCEDURE — 3078F DIAST BP <80 MM HG: CPT | Mod: CPTII,S$GLB,, | Performed by: PSYCHIATRY & NEUROLOGY

## 2021-10-14 PROCEDURE — 4010F PR ACE/ARB THEARPY RXD/TAKEN: ICD-10-PCS | Mod: CPTII,S$GLB,, | Performed by: PSYCHIATRY & NEUROLOGY

## 2021-10-14 PROCEDURE — 1159F PR MEDICATION LIST DOCUMENTED IN MEDICAL RECORD: ICD-10-PCS | Mod: CPTII,S$GLB,, | Performed by: PSYCHIATRY & NEUROLOGY

## 2021-10-14 PROCEDURE — 1159F MED LIST DOCD IN RCRD: CPT | Mod: CPTII,S$GLB,, | Performed by: PSYCHIATRY & NEUROLOGY

## 2021-10-14 PROCEDURE — 3074F SYST BP LT 130 MM HG: CPT | Mod: CPTII,S$GLB,, | Performed by: PSYCHIATRY & NEUROLOGY

## 2021-10-14 PROCEDURE — 3044F HG A1C LEVEL LT 7.0%: CPT | Mod: CPTII,S$GLB,, | Performed by: PSYCHIATRY & NEUROLOGY

## 2021-10-14 PROCEDURE — 4010F ACE/ARB THERAPY RXD/TAKEN: CPT | Mod: CPTII,S$GLB,, | Performed by: PSYCHIATRY & NEUROLOGY

## 2021-10-14 PROCEDURE — 3074F PR MOST RECENT SYSTOLIC BLOOD PRESSURE < 130 MM HG: ICD-10-PCS | Mod: CPTII,S$GLB,, | Performed by: PSYCHIATRY & NEUROLOGY

## 2021-10-14 PROCEDURE — 99204 PR OFFICE/OUTPT VISIT, NEW, LEVL IV, 45-59 MIN: ICD-10-PCS | Mod: S$GLB,,, | Performed by: PSYCHIATRY & NEUROLOGY

## 2021-10-14 PROCEDURE — 3078F PR MOST RECENT DIASTOLIC BLOOD PRESSURE < 80 MM HG: ICD-10-PCS | Mod: CPTII,S$GLB,, | Performed by: PSYCHIATRY & NEUROLOGY

## 2021-10-14 PROCEDURE — 99999 PR PBB SHADOW E&M-EST. PATIENT-LVL III: ICD-10-PCS | Mod: PBBFAC,,, | Performed by: PSYCHIATRY & NEUROLOGY

## 2021-10-14 RX ORDER — SUMATRIPTAN SUCCINATE 100 MG/1
100 TABLET ORAL
Qty: 27 TABLET | Refills: 3 | Status: SHIPPED | OUTPATIENT
Start: 2021-10-14

## 2021-10-14 RX ORDER — TOPIRAMATE 50 MG/1
50 TABLET, FILM COATED ORAL 2 TIMES DAILY
Qty: 60 TABLET | Refills: 5 | Status: SHIPPED | OUTPATIENT
Start: 2021-10-14 | End: 2022-05-03

## 2021-10-28 ENCOUNTER — IMMUNIZATION (OUTPATIENT)
Dept: INTERNAL MEDICINE | Facility: CLINIC | Age: 56
End: 2021-10-28
Payer: COMMERCIAL

## 2021-10-28 DIAGNOSIS — Z23 NEED FOR VACCINATION: Primary | ICD-10-CM

## 2021-10-28 PROCEDURE — 0003A COVID-19, MRNA, LNP-S, PF, 30 MCG/0.3 ML DOSE VACCINE: CPT | Mod: CV19,PBBFAC | Performed by: INTERNAL MEDICINE

## 2021-10-28 PROCEDURE — 91300 COVID-19, MRNA, LNP-S, PF, 30 MCG/0.3 ML DOSE VACCINE: CPT | Mod: PBBFAC | Performed by: INTERNAL MEDICINE

## 2022-01-12 ENCOUNTER — TELEPHONE (OUTPATIENT)
Dept: OTOLARYNGOLOGY | Facility: CLINIC | Age: 57
End: 2022-01-12
Payer: COMMERCIAL

## 2022-01-13 ENCOUNTER — TELEPHONE (OUTPATIENT)
Dept: OTOLARYNGOLOGY | Facility: CLINIC | Age: 57
End: 2022-01-13
Payer: COMMERCIAL

## 2022-01-13 NOTE — TELEPHONE ENCOUNTER
----- Message from Brenda Pérez MA sent at 1/13/2022  9:19 AM CST -----  Regarding: FW: returning missed call    ----- Message -----  From: Sarai Dunham MA  Sent: 1/13/2022   9:17 AM CST  To: Reggie Conn Staff  Subject: returning missed call                            Type:  Patient Returning Call         Who Called: CHRIS RAND [[id number]]         Who Left Message for Patient: [place]          Does the patient know what this is regarding? Moving appt time          Best Call Back Number:[phone]         Additional Information:

## 2022-01-13 NOTE — TELEPHONE ENCOUNTER
GOLDEN on patient's VM that I just had so many openings today that just wanted to see if wanted to come earlier / asked her to move her appointment on 1/31 to 10:30am instead of 1:00pm to free Drs. afternoon

## 2022-01-13 NOTE — TELEPHONE ENCOUNTER
----- Message from Tabatha Alejandro MA sent at 1/12/2022  4:30 PM CST -----  Regarding: FW: Pt returning call    ----- Message -----  From: Mata Quevedo  Sent: 1/12/2022  12:05 PM CST  To: Reggie Conn Staff  Subject: Pt returning call                                Who Called:CHRIS RAND [[id number]]        Who Left Message for Patient:Unknown        Does the patient know what this is regarding?Yes        Best Call Back Number:[phone]        Additional Information:Pt did not want suggest appt due to having grandson. Jan 18-31 she is available.

## 2022-01-31 ENCOUNTER — OFFICE VISIT (OUTPATIENT)
Dept: OTOLARYNGOLOGY | Facility: CLINIC | Age: 57
End: 2022-01-31
Payer: COMMERCIAL

## 2022-01-31 VITALS
HEIGHT: 69 IN | BODY MASS INDEX: 20.84 KG/M2 | WEIGHT: 140.69 LBS | HEART RATE: 80 BPM | SYSTOLIC BLOOD PRESSURE: 162 MMHG | DIASTOLIC BLOOD PRESSURE: 96 MMHG | TEMPERATURE: 98 F

## 2022-01-31 DIAGNOSIS — R09.82 PND (POST-NASAL DRIP): ICD-10-CM

## 2022-01-31 DIAGNOSIS — H93.8X3 EAR FULLNESS, BILATERAL: ICD-10-CM

## 2022-01-31 DIAGNOSIS — H92.03 OTALGIA OF BOTH EARS: ICD-10-CM

## 2022-01-31 DIAGNOSIS — H91.90 HEARING DISORDER, UNSPECIFIED LATERALITY: Primary | ICD-10-CM

## 2022-01-31 DIAGNOSIS — Z92.89 HISTORY OF DENTAL SURGERY: ICD-10-CM

## 2022-01-31 DIAGNOSIS — H93.13 TINNITUS, BILATERAL: ICD-10-CM

## 2022-01-31 DIAGNOSIS — M26.69 TMJ CREPITUS: ICD-10-CM

## 2022-01-31 PROCEDURE — 1160F PR REVIEW ALL MEDS BY PRESCRIBER/CLIN PHARMACIST DOCUMENTED: ICD-10-PCS | Mod: CPTII,S$GLB,, | Performed by: OTOLARYNGOLOGY

## 2022-01-31 PROCEDURE — 3008F BODY MASS INDEX DOCD: CPT | Mod: CPTII,S$GLB,, | Performed by: OTOLARYNGOLOGY

## 2022-01-31 PROCEDURE — 1159F PR MEDICATION LIST DOCUMENTED IN MEDICAL RECORD: ICD-10-PCS | Mod: CPTII,S$GLB,, | Performed by: OTOLARYNGOLOGY

## 2022-01-31 PROCEDURE — 3077F PR MOST RECENT SYSTOLIC BLOOD PRESSURE >= 140 MM HG: ICD-10-PCS | Mod: CPTII,S$GLB,, | Performed by: OTOLARYNGOLOGY

## 2022-01-31 PROCEDURE — 3080F DIAST BP >= 90 MM HG: CPT | Mod: CPTII,S$GLB,, | Performed by: OTOLARYNGOLOGY

## 2022-01-31 PROCEDURE — 3008F PR BODY MASS INDEX (BMI) DOCUMENTED: ICD-10-PCS | Mod: CPTII,S$GLB,, | Performed by: OTOLARYNGOLOGY

## 2022-01-31 PROCEDURE — 3080F PR MOST RECENT DIASTOLIC BLOOD PRESSURE >= 90 MM HG: ICD-10-PCS | Mod: CPTII,S$GLB,, | Performed by: OTOLARYNGOLOGY

## 2022-01-31 PROCEDURE — 3077F SYST BP >= 140 MM HG: CPT | Mod: CPTII,S$GLB,, | Performed by: OTOLARYNGOLOGY

## 2022-01-31 PROCEDURE — 1159F MED LIST DOCD IN RCRD: CPT | Mod: CPTII,S$GLB,, | Performed by: OTOLARYNGOLOGY

## 2022-01-31 PROCEDURE — 99204 PR OFFICE/OUTPT VISIT, NEW, LEVL IV, 45-59 MIN: ICD-10-PCS | Mod: S$GLB,,, | Performed by: OTOLARYNGOLOGY

## 2022-01-31 PROCEDURE — 1160F RVW MEDS BY RX/DR IN RCRD: CPT | Mod: CPTII,S$GLB,, | Performed by: OTOLARYNGOLOGY

## 2022-01-31 PROCEDURE — 99204 OFFICE O/P NEW MOD 45 MIN: CPT | Mod: S$GLB,,, | Performed by: OTOLARYNGOLOGY

## 2022-01-31 RX ORDER — MELOXICAM 15 MG/1
15 TABLET ORAL CONTINUOUS PRN
COMMUNITY
End: 2023-05-29

## 2022-01-31 NOTE — PROGRESS NOTES
Subjective:       Patient ID: Teresa Trujillo is a 56 y.o. female.    Chief Complaint: Other (Ear pain since got booster and flu shot at end of October / pain not as severe but still there)    She is a new patient for me here today complaining of ear pain.  States got both her COVID booster and flu shot on 10/28/2021.  The next day began with ear pain with no constitutional or other symptoms.  Otalgia has continued since then though overall improved.  Present bilaterally all the time to varying degrees.  Not aware of any triggers or alleviating factors.  Had dental work consisting of a root canal left upper molar in September.  No dental complaints.  Not aware of clenching.  Used to chew gum but less now.  In addition to pain and discomfort feels fullness and stuffiness in the ears.  No sinonasal symptoms except drippy nose, more so with masking.  Has been on cetirizine which has helped some.  No air travel.  Occasional buzzing in the ears AU.  No prior otologic history.  Feels fine otherwise.  Medical history includes migraine, type 2 DM, hypertension.          Review of Systems     Constitutional: Negative for fever.      HENT: Positive for ear pain, postnasal drip, ringing in the ears and dental problems.  Negative for ear discharge, hearing loss, sore throat, stuffy nose, trouble swallowing and voice change.      Respiratory:  Negative for cough and shortness of breath.      Cardiovascular:  Negative for chest pain.     Gastrointestinal:  Negative for abdominal pain.     Neurological: Negative for dizziness.      Hematologic: Negative for swollen glands.                Objective:        Vitals:    01/31/22 1053   BP: (!) 162/96   Pulse: 80   Temp: 98.1 °F (36.7 °C)     Body mass index is 20.78 kg/m².  Physical Exam  Constitutional:       General: She is not in acute distress.     Appearance: She is well-developed.   HENT:      Head: Normocephalic and atraumatic.      Right Ear: Tympanic membrane, ear canal  and external ear normal.      Left Ear: Tympanic membrane, ear canal and external ear normal.      Nose: No nasal deformity, mucosal edema or rhinorrhea.      Mouth/Throat:      Mouth: Mucous membranes are moist.      Pharynx: No pharyngeal swelling, oropharyngeal exudate or posterior oropharyngeal erythema.      Comments:   Crepitus at TMJs bilaterally.  Neck:      Trachea: Phonation normal.   Pulmonary:      Effort: Pulmonary effort is normal. No respiratory distress.   Musculoskeletal:      Cervical back: Neck supple.   Lymphadenopathy:      Cervical: No cervical adenopathy.   Skin:     General: Skin is warm and dry.   Neurological:      Mental Status: She is alert and oriented to person, place, and time.   Psychiatric:         Speech: Speech normal.         Behavior: Behavior normal.         Tests / Results:        Assessment:       1. Otalgia of both ears    2. TMJ crepitus    3. History of dental surgery    4. Ear fullness, bilateral    5. Tinnitus, bilateral    6. PND (post-nasal drip)        Plan:       Reviewed above and considerations and recommendations and answered questions.  Will proceed as follows:    TMJ measures including soft diet, avoid hard crunchy foods, do not chew gum or ice or lozenges, apply moist heat as needed, judicious use of ibuprofen as needed, possible .    Okay to continue levoceitrizine.    Add fluticasone nasal spray 2 sprays in each nostril every evening.    Follow up in 3 weeks with audio first.

## 2022-01-31 NOTE — PATIENT INSTRUCTIONS
TMJ measures including soft diet, avoid hard crunchy foods, do not chew gum or ice or lozenges, apply moist heat as needed, judicious use of ibuprofen as needed, possible .    Okay to continue levoceitrizine.    Add fluticasone nasal spray 2 sprays in each nostril every evening.    Follow up in 3 weeks with audio first.

## 2022-02-03 ENCOUNTER — OFFICE VISIT (OUTPATIENT)
Dept: NEUROLOGY | Facility: CLINIC | Age: 57
End: 2022-02-03
Payer: COMMERCIAL

## 2022-02-03 VITALS
HEIGHT: 69 IN | WEIGHT: 138.88 LBS | DIASTOLIC BLOOD PRESSURE: 78 MMHG | SYSTOLIC BLOOD PRESSURE: 125 MMHG | HEART RATE: 86 BPM | BODY MASS INDEX: 20.57 KG/M2

## 2022-02-03 DIAGNOSIS — E11.42 DIABETIC POLYNEUROPATHY ASSOCIATED WITH TYPE 2 DIABETES MELLITUS: ICD-10-CM

## 2022-02-03 DIAGNOSIS — G43.009 MIGRAINE WITHOUT AURA AND WITHOUT STATUS MIGRAINOSUS, NOT INTRACTABLE: Primary | ICD-10-CM

## 2022-02-03 PROCEDURE — 3078F DIAST BP <80 MM HG: CPT | Mod: CPTII,S$GLB,, | Performed by: PSYCHIATRY & NEUROLOGY

## 2022-02-03 PROCEDURE — 1159F PR MEDICATION LIST DOCUMENTED IN MEDICAL RECORD: ICD-10-PCS | Mod: CPTII,S$GLB,, | Performed by: PSYCHIATRY & NEUROLOGY

## 2022-02-03 PROCEDURE — 99999 PR PBB SHADOW E&M-EST. PATIENT-LVL III: CPT | Mod: PBBFAC,,, | Performed by: PSYCHIATRY & NEUROLOGY

## 2022-02-03 PROCEDURE — 3074F PR MOST RECENT SYSTOLIC BLOOD PRESSURE < 130 MM HG: ICD-10-PCS | Mod: CPTII,S$GLB,, | Performed by: PSYCHIATRY & NEUROLOGY

## 2022-02-03 PROCEDURE — 3008F BODY MASS INDEX DOCD: CPT | Mod: CPTII,S$GLB,, | Performed by: PSYCHIATRY & NEUROLOGY

## 2022-02-03 PROCEDURE — 3074F SYST BP LT 130 MM HG: CPT | Mod: CPTII,S$GLB,, | Performed by: PSYCHIATRY & NEUROLOGY

## 2022-02-03 PROCEDURE — 3008F PR BODY MASS INDEX (BMI) DOCUMENTED: ICD-10-PCS | Mod: CPTII,S$GLB,, | Performed by: PSYCHIATRY & NEUROLOGY

## 2022-02-03 PROCEDURE — 99214 OFFICE O/P EST MOD 30 MIN: CPT | Mod: S$GLB,,, | Performed by: PSYCHIATRY & NEUROLOGY

## 2022-02-03 PROCEDURE — 99999 PR PBB SHADOW E&M-EST. PATIENT-LVL III: ICD-10-PCS | Mod: PBBFAC,,, | Performed by: PSYCHIATRY & NEUROLOGY

## 2022-02-03 PROCEDURE — 1159F MED LIST DOCD IN RCRD: CPT | Mod: CPTII,S$GLB,, | Performed by: PSYCHIATRY & NEUROLOGY

## 2022-02-03 PROCEDURE — 3078F PR MOST RECENT DIASTOLIC BLOOD PRESSURE < 80 MM HG: ICD-10-PCS | Mod: CPTII,S$GLB,, | Performed by: PSYCHIATRY & NEUROLOGY

## 2022-02-03 PROCEDURE — 99214 PR OFFICE/OUTPT VISIT, EST, LEVL IV, 30-39 MIN: ICD-10-PCS | Mod: S$GLB,,, | Performed by: PSYCHIATRY & NEUROLOGY

## 2022-02-03 NOTE — PROGRESS NOTES
Neurology Clinic Follow-Up Note    Impression:  1. Episodic migraine without aura  2. Mild diabetic polyneuropathy    Plan:  1. Continue topiramate 50mg bid for HA prophylaxis  2. Try Imitrex 50mg (1/2 of 100mg) po prn  3. She will update me via Squarespacehart if not improving  4. [facility]  5. RTC 6 months      Problem List Items Addressed This Visit        1 - High    Migraine without aura and without status migrainosus, not intractable - Primary    Relevant Orders    Virtual Migraine Questionnaire Series (Completed)       2     Diabetic polyneuropathy associated with type 2 diabetes mellitus        Patient returns for follow-up of above.  She is doing well on topiramate without side effects.  Imitrex 100mg was effective but caused throat tightness.        Freq: 1/30 and 0/30    Abortives   Effective: ibuprofen/naproxen (variably), Imitrex 100mg (effective but throat tightness)   Ineffective/not tolerated: Fioricet    Prophylactics    Effective: topiramate        HPI:  57 y/o AAF referred for evaluation and treatment of headaches.   She has a long-standing history (years) of episodic headache (diagnosed as migraine)    4 days ago, she had a severe headache with B neck pain and headache.  Similar symptoms twice in the past. Ibuprofen helped    Associated symptoms: nausea (occasional), +photo/-phonophobia, + unilateral throbbing  Duration: hours to all day  No aura  No interictal neck pain.  No sleep issues      Past Medical History:   Diagnosis Date    Diabetes mellitus, type 2     HTN (hypertension)     Hyperlipidemia     Migraine          Outpatient Medications Marked as Taking for the 2/3/22 encounter (Office Visit) with Jairo Burgess MD   Medication Sig Dispense Refill    blood sugar diagnostic (ONETOUCH VERIO TEST STRIPS) Strp Use to test glucose once a day 100 strip 11    lancets (ONETOUCH DELICA PLUS LANCET) 33 gauge Misc Use to test glucose once daily 100 each 11    lisinopriL  "(PRINIVIL,ZESTRIL) 20 MG tablet Take 1 tablet (20 mg total) by mouth once daily. 90 tablet 3    meloxicam (MOBIC) 15 MG tablet Take 15 mg by mouth once daily.      norethindrone ac-eth estradiol (AUROVELA 1/20, 21, ORAL) Take by mouth once daily.      norethindrone-ethinyl estradiol (JUNEL FE 1/20) 1 mg-20 mcg (21)/75 mg (7) per tablet       pantoprazole (PROTONIX) 40 MG tablet Take 40 mg by mouth once daily.       sumatriptan (IMITREX) 100 MG tablet Take 1 tablet (100 mg total) by mouth every 2 (two) hours as needed for Migraine (Do not exceed 200mg in 24 hours). 27 tablet 3    topiramate (TOPAMAX) 50 MG tablet Take 1 tablet (50 mg total) by mouth 2 (two) times daily. 60 tablet 5       /78 (BP Location: Right arm, Patient Position: Sitting, BP Method: Large (Automatic))   Pulse 86   Ht 5' 9" (1.753 m)   Wt 63 kg (138 lb 14.2 oz)   BMI 20.51 kg/m²   Well-developed, well nourished.  Awake, alert and oriented.  Language is normal.  EOMF without nystagmus, VFF, facial movements intact.  No UE drift.  No extinction to double simultaneous tactile stimulation.  Glove-stocking decrease PP and trace AJs. Muscle power is normal.  Gait is steady.    33 mins chart review, face to face, documentation    Jairo Burgess MD    "

## 2022-02-14 ENCOUNTER — PATIENT MESSAGE (OUTPATIENT)
Dept: ENDOCRINOLOGY | Facility: CLINIC | Age: 57
End: 2022-02-14
Payer: COMMERCIAL

## 2022-02-18 ENCOUNTER — PATIENT MESSAGE (OUTPATIENT)
Dept: ADMINISTRATIVE | Facility: OTHER | Age: 57
End: 2022-02-18
Payer: COMMERCIAL

## 2022-03-02 ENCOUNTER — PATIENT MESSAGE (OUTPATIENT)
Dept: OTOLARYNGOLOGY | Facility: CLINIC | Age: 57
End: 2022-03-02
Payer: COMMERCIAL

## 2022-03-02 ENCOUNTER — TELEPHONE (OUTPATIENT)
Dept: OTOLARYNGOLOGY | Facility: CLINIC | Age: 57
End: 2022-03-02
Payer: COMMERCIAL

## 2022-03-02 NOTE — TELEPHONE ENCOUNTER
LM on patients VM needing to cancel appointments with Dr. Scruggs and Dr. Paredes due to Dr. Scruggs being out

## 2022-03-07 ENCOUNTER — CLINICAL SUPPORT (OUTPATIENT)
Dept: AUDIOLOGY | Facility: CLINIC | Age: 57
End: 2022-03-07
Payer: COMMERCIAL

## 2022-03-07 ENCOUNTER — TELEPHONE (OUTPATIENT)
Dept: ENDOCRINOLOGY | Facility: CLINIC | Age: 57
End: 2022-03-07
Payer: COMMERCIAL

## 2022-03-07 ENCOUNTER — PATIENT MESSAGE (OUTPATIENT)
Dept: ADMINISTRATIVE | Facility: OTHER | Age: 57
End: 2022-03-07
Payer: COMMERCIAL

## 2022-03-07 ENCOUNTER — PATIENT MESSAGE (OUTPATIENT)
Dept: OTOLARYNGOLOGY | Facility: CLINIC | Age: 57
End: 2022-03-07

## 2022-03-07 ENCOUNTER — LAB VISIT (OUTPATIENT)
Dept: LAB | Facility: OTHER | Age: 57
End: 2022-03-07
Attending: INTERNAL MEDICINE
Payer: COMMERCIAL

## 2022-03-07 ENCOUNTER — OFFICE VISIT (OUTPATIENT)
Dept: OTOLARYNGOLOGY | Facility: CLINIC | Age: 57
End: 2022-03-07
Payer: COMMERCIAL

## 2022-03-07 VITALS
BODY MASS INDEX: 20.24 KG/M2 | HEART RATE: 72 BPM | HEIGHT: 69 IN | TEMPERATURE: 99 F | WEIGHT: 136.69 LBS | SYSTOLIC BLOOD PRESSURE: 110 MMHG | DIASTOLIC BLOOD PRESSURE: 75 MMHG

## 2022-03-07 DIAGNOSIS — Z01.10 NORMAL MIDDLE EAR TRANSMISSION BY TYMPANOGRAM: ICD-10-CM

## 2022-03-07 DIAGNOSIS — H91.90 MILD HEARING LOSS: ICD-10-CM

## 2022-03-07 DIAGNOSIS — E11.9 TYPE 2 DIABETES MELLITUS WITHOUT COMPLICATION, WITHOUT LONG-TERM CURRENT USE OF INSULIN: Primary | ICD-10-CM

## 2022-03-07 DIAGNOSIS — I10 ESSENTIAL HYPERTENSION: ICD-10-CM

## 2022-03-07 DIAGNOSIS — H91.90 HEARING DISORDER, UNSPECIFIED LATERALITY: ICD-10-CM

## 2022-03-07 DIAGNOSIS — H92.03 OTALGIA OF BOTH EARS: ICD-10-CM

## 2022-03-07 DIAGNOSIS — E11.9 TYPE 2 DIABETES MELLITUS WITHOUT COMPLICATION, WITHOUT LONG-TERM CURRENT USE OF INSULIN: ICD-10-CM

## 2022-03-07 DIAGNOSIS — H93.13 TINNITUS, BILATERAL: ICD-10-CM

## 2022-03-07 DIAGNOSIS — H93.8X3 SENSATION OF FULLNESS IN BOTH EARS: ICD-10-CM

## 2022-03-07 DIAGNOSIS — H90.3 SENSORINEURAL HEARING LOSS, BILATERAL: Primary | ICD-10-CM

## 2022-03-07 DIAGNOSIS — M26.69 TMJ CREPITUS: ICD-10-CM

## 2022-03-07 LAB
ANION GAP SERPL CALC-SCNC: 10 MMOL/L (ref 8–16)
BUN SERPL-MCNC: 9 MG/DL (ref 6–20)
CALCIUM SERPL-MCNC: 10.3 MG/DL (ref 8.7–10.5)
CHLORIDE SERPL-SCNC: 107 MMOL/L (ref 95–110)
CHOLEST SERPL-MCNC: 235 MG/DL (ref 120–199)
CHOLEST/HDLC SERPL: 3.5 {RATIO} (ref 2–5)
CO2 SERPL-SCNC: 23 MMOL/L (ref 23–29)
CREAT SERPL-MCNC: 0.8 MG/DL (ref 0.5–1.4)
EST. GFR  (AFRICAN AMERICAN): >60 ML/MIN/1.73 M^2
EST. GFR  (NON AFRICAN AMERICAN): >60 ML/MIN/1.73 M^2
ESTIMATED AVG GLUCOSE: 131 MG/DL (ref 68–131)
GLUCOSE SERPL-MCNC: 111 MG/DL (ref 70–110)
HBA1C MFR BLD: 6.2 % (ref 4–5.6)
HDLC SERPL-MCNC: 68 MG/DL (ref 40–75)
HDLC SERPL: 28.9 % (ref 20–50)
LDLC SERPL CALC-MCNC: 135.2 MG/DL (ref 63–159)
NONHDLC SERPL-MCNC: 167 MG/DL
POTASSIUM SERPL-SCNC: 3.8 MMOL/L (ref 3.5–5.1)
SODIUM SERPL-SCNC: 140 MMOL/L (ref 136–145)
TRIGL SERPL-MCNC: 159 MG/DL (ref 30–150)
TSH SERPL DL<=0.005 MIU/L-ACNC: 0.67 UIU/ML (ref 0.4–4)

## 2022-03-07 PROCEDURE — 92567 TYMPANOMETRY: CPT | Mod: S$GLB,,, | Performed by: AUDIOLOGIST

## 2022-03-07 PROCEDURE — 36415 COLL VENOUS BLD VENIPUNCTURE: CPT | Performed by: INTERNAL MEDICINE

## 2022-03-07 PROCEDURE — 92557 COMPREHENSIVE HEARING TEST: CPT | Mod: S$GLB,,, | Performed by: AUDIOLOGIST

## 2022-03-07 PROCEDURE — 99214 PR OFFICE/OUTPT VISIT, EST, LEVL IV, 30-39 MIN: ICD-10-PCS | Mod: S$GLB,,, | Performed by: OTOLARYNGOLOGY

## 2022-03-07 PROCEDURE — 4010F PR ACE/ARB THEARPY RXD/TAKEN: ICD-10-PCS | Mod: CPTII,S$GLB,, | Performed by: OTOLARYNGOLOGY

## 2022-03-07 PROCEDURE — 92567 PR TYMPA2METRY: ICD-10-PCS | Mod: S$GLB,,, | Performed by: AUDIOLOGIST

## 2022-03-07 PROCEDURE — 92557 PR COMPREHENSIVE HEARING TEST: ICD-10-PCS | Mod: S$GLB,,, | Performed by: AUDIOLOGIST

## 2022-03-07 PROCEDURE — 3074F PR MOST RECENT SYSTOLIC BLOOD PRESSURE < 130 MM HG: ICD-10-PCS | Mod: CPTII,S$GLB,, | Performed by: OTOLARYNGOLOGY

## 2022-03-07 PROCEDURE — 83036 HEMOGLOBIN GLYCOSYLATED A1C: CPT | Performed by: INTERNAL MEDICINE

## 2022-03-07 PROCEDURE — 4010F ACE/ARB THERAPY RXD/TAKEN: CPT | Mod: CPTII,S$GLB,, | Performed by: OTOLARYNGOLOGY

## 2022-03-07 PROCEDURE — 3008F BODY MASS INDEX DOCD: CPT | Mod: CPTII,S$GLB,, | Performed by: OTOLARYNGOLOGY

## 2022-03-07 PROCEDURE — 3078F PR MOST RECENT DIASTOLIC BLOOD PRESSURE < 80 MM HG: ICD-10-PCS | Mod: CPTII,S$GLB,, | Performed by: OTOLARYNGOLOGY

## 2022-03-07 PROCEDURE — 3078F DIAST BP <80 MM HG: CPT | Mod: CPTII,S$GLB,, | Performed by: OTOLARYNGOLOGY

## 2022-03-07 PROCEDURE — 84443 ASSAY THYROID STIM HORMONE: CPT | Performed by: INTERNAL MEDICINE

## 2022-03-07 PROCEDURE — 1159F PR MEDICATION LIST DOCUMENTED IN MEDICAL RECORD: ICD-10-PCS | Mod: CPTII,S$GLB,, | Performed by: OTOLARYNGOLOGY

## 2022-03-07 PROCEDURE — 3074F SYST BP LT 130 MM HG: CPT | Mod: CPTII,S$GLB,, | Performed by: OTOLARYNGOLOGY

## 2022-03-07 PROCEDURE — 99999 PR PBB SHADOW E&M-EST. PATIENT-LVL I: ICD-10-PCS | Mod: PBBFAC,,, | Performed by: AUDIOLOGIST

## 2022-03-07 PROCEDURE — 80061 LIPID PANEL: CPT | Performed by: INTERNAL MEDICINE

## 2022-03-07 PROCEDURE — 1159F MED LIST DOCD IN RCRD: CPT | Mod: CPTII,S$GLB,, | Performed by: OTOLARYNGOLOGY

## 2022-03-07 PROCEDURE — 1160F PR REVIEW ALL MEDS BY PRESCRIBER/CLIN PHARMACIST DOCUMENTED: ICD-10-PCS | Mod: CPTII,S$GLB,, | Performed by: OTOLARYNGOLOGY

## 2022-03-07 PROCEDURE — 3008F PR BODY MASS INDEX (BMI) DOCUMENTED: ICD-10-PCS | Mod: CPTII,S$GLB,, | Performed by: OTOLARYNGOLOGY

## 2022-03-07 PROCEDURE — 1160F RVW MEDS BY RX/DR IN RCRD: CPT | Mod: CPTII,S$GLB,, | Performed by: OTOLARYNGOLOGY

## 2022-03-07 PROCEDURE — 3044F HG A1C LEVEL LT 7.0%: CPT | Mod: CPTII,S$GLB,, | Performed by: OTOLARYNGOLOGY

## 2022-03-07 PROCEDURE — 3044F PR MOST RECENT HEMOGLOBIN A1C LEVEL <7.0%: ICD-10-PCS | Mod: CPTII,S$GLB,, | Performed by: OTOLARYNGOLOGY

## 2022-03-07 PROCEDURE — 99214 OFFICE O/P EST MOD 30 MIN: CPT | Mod: S$GLB,,, | Performed by: OTOLARYNGOLOGY

## 2022-03-07 PROCEDURE — 80048 BASIC METABOLIC PNL TOTAL CA: CPT | Performed by: INTERNAL MEDICINE

## 2022-03-07 PROCEDURE — 99999 PR PBB SHADOW E&M-EST. PATIENT-LVL I: CPT | Mod: PBBFAC,,, | Performed by: AUDIOLOGIST

## 2022-03-07 NOTE — PATIENT INSTRUCTIONS
Continue TMJ measures including soft diet, avoid hard crunchy foods, do not chew gum or ice or lozenges, apply moist heat as needed, judicious use of ibuprofen as needed, possible .    Continue levoceitrizine.    Hearing protection.    Tinnitus print out.      Follow up if change or problems and 6 - 8 weeks.

## 2022-03-07 NOTE — TELEPHONE ENCOUNTER
Last seen 2/2021.  Recommended f/u with PCP due to well controlled/diet controlled diabetes.    Pt came to clinic today requesting labs, had scheduled f/u for next week. If pt prefers to have f/u here for diabetes/prediabetes, that's fine. Orders in, okay for fasting labs when able.  Has f/u next week, can review labs then.

## 2022-03-07 NOTE — PROGRESS NOTES
Teresa Trujillo, a 57 y.o. female, was seen today in the clinic for an audiologic evaluation.  Patients main complaint was otalgia and aural fullness.  Ms. Trujillo reported that since October 2021 she has been experiencing intermittent otalgia, aural fullness and tinnitus.  She feels that her hearing is decreased bilaterally.     Tympanometry revealed Type A in the right ear and Type A in the left ear. Audiogram results revealed mild sensorineural hearing loss (SNHL) in the right ear and mild SNHL in the left ear.  Speech reception thresholds were noted at 30 dB in the right ear and 35 dB in the left ear.  Speech discrimination scores were 88% in the right ear and 96% in the left ear.    Recommendations:  1. Otologic evaluation  2. Annual audiogram  3. Hearing protection when in noise

## 2022-03-08 ENCOUNTER — TELEPHONE (OUTPATIENT)
Dept: NEUROLOGY | Facility: CLINIC | Age: 57
End: 2022-03-08

## 2022-03-08 NOTE — TELEPHONE ENCOUNTER
----- Message from Quita Hong sent at 3/8/2022 10:09 AM CST -----  Regarding: Requesting Call Back  Regarding:Pt called to speak to nurse or doctor about questions she has. Pt states she left message on yesterday thru portal. Pt asked to send this message urgent.       Can patient be contacted on [email or web address]:YES       Requesting Call back number:[phone]

## 2022-03-08 NOTE — TELEPHONE ENCOUNTER
----- Message from Quita Hong sent at 3/8/2022 10:09 AM CST -----  Regarding: Requesting Call Back  Regarding:Pt called to speak to nurse or doctor about questions she has. Pt states she left message on yesterday thru portal. Pt asked to send this message urgent.       Can patient be contacted on [facility] Doctor[facility]:YES       Requesting Call back number:[phone]

## 2022-03-08 NOTE — TELEPHONE ENCOUNTER
----- Message from Jenni Calvert sent at 3/8/2022  2:16 PM CST -----  Regarding: pt  Pt is returning Addis's call can you please call pt at [phone].    ELOINA

## 2022-03-08 NOTE — TELEPHONE ENCOUNTER
----- Message from Addis Orona MA sent at 3/8/2022  4:25 PM CST -----  Regarding: FW: Returned call    ----- Message -----  From: Maude Ray  Sent: 3/8/2022   3:16 PM CST  To: Jenifer Gill Staff  Subject: Returned call                                    Pt returning missed call from  Addis     [phone] (home)

## 2022-03-09 ENCOUNTER — PATIENT MESSAGE (OUTPATIENT)
Dept: NEUROLOGY | Facility: CLINIC | Age: 57
End: 2022-03-09
Payer: COMMERCIAL

## 2022-03-09 ENCOUNTER — TELEPHONE (OUTPATIENT)
Dept: OTOLARYNGOLOGY | Facility: CLINIC | Age: 57
End: 2022-03-09
Payer: COMMERCIAL

## 2022-03-09 NOTE — TELEPHONE ENCOUNTER
Bringing other ENT doctors notes so they can be scanned in chart for Dr. Paredes to see at her next visit

## 2022-03-14 ENCOUNTER — OFFICE VISIT (OUTPATIENT)
Dept: ENDOCRINOLOGY | Facility: CLINIC | Age: 57
End: 2022-03-14
Payer: COMMERCIAL

## 2022-03-14 VITALS
DIASTOLIC BLOOD PRESSURE: 72 MMHG | HEART RATE: 87 BPM | WEIGHT: 136.69 LBS | HEIGHT: 69 IN | SYSTOLIC BLOOD PRESSURE: 120 MMHG | BODY MASS INDEX: 20.24 KG/M2

## 2022-03-14 DIAGNOSIS — I10 ESSENTIAL HYPERTENSION: ICD-10-CM

## 2022-03-14 DIAGNOSIS — E78.2 MIXED HYPERLIPIDEMIA: ICD-10-CM

## 2022-03-14 DIAGNOSIS — E11.9 TYPE 2 DIABETES MELLITUS WITHOUT COMPLICATION, WITHOUT LONG-TERM CURRENT USE OF INSULIN: Primary | ICD-10-CM

## 2022-03-14 PROCEDURE — 3044F HG A1C LEVEL LT 7.0%: CPT | Mod: CPTII,S$GLB,, | Performed by: INTERNAL MEDICINE

## 2022-03-14 PROCEDURE — 3008F BODY MASS INDEX DOCD: CPT | Mod: CPTII,S$GLB,, | Performed by: INTERNAL MEDICINE

## 2022-03-14 PROCEDURE — 4010F ACE/ARB THERAPY RXD/TAKEN: CPT | Mod: CPTII,S$GLB,, | Performed by: INTERNAL MEDICINE

## 2022-03-14 PROCEDURE — 99214 PR OFFICE/OUTPT VISIT, EST, LEVL IV, 30-39 MIN: ICD-10-PCS | Mod: S$GLB,,, | Performed by: INTERNAL MEDICINE

## 2022-03-14 PROCEDURE — 1159F MED LIST DOCD IN RCRD: CPT | Mod: CPTII,S$GLB,, | Performed by: INTERNAL MEDICINE

## 2022-03-14 PROCEDURE — 3074F PR MOST RECENT SYSTOLIC BLOOD PRESSURE < 130 MM HG: ICD-10-PCS | Mod: CPTII,S$GLB,, | Performed by: INTERNAL MEDICINE

## 2022-03-14 PROCEDURE — 1160F RVW MEDS BY RX/DR IN RCRD: CPT | Mod: CPTII,S$GLB,, | Performed by: INTERNAL MEDICINE

## 2022-03-14 PROCEDURE — 1159F PR MEDICATION LIST DOCUMENTED IN MEDICAL RECORD: ICD-10-PCS | Mod: CPTII,S$GLB,, | Performed by: INTERNAL MEDICINE

## 2022-03-14 PROCEDURE — 3078F DIAST BP <80 MM HG: CPT | Mod: CPTII,S$GLB,, | Performed by: INTERNAL MEDICINE

## 2022-03-14 PROCEDURE — 3008F PR BODY MASS INDEX (BMI) DOCUMENTED: ICD-10-PCS | Mod: CPTII,S$GLB,, | Performed by: INTERNAL MEDICINE

## 2022-03-14 PROCEDURE — 3044F PR MOST RECENT HEMOGLOBIN A1C LEVEL <7.0%: ICD-10-PCS | Mod: CPTII,S$GLB,, | Performed by: INTERNAL MEDICINE

## 2022-03-14 PROCEDURE — 4010F PR ACE/ARB THEARPY RXD/TAKEN: ICD-10-PCS | Mod: CPTII,S$GLB,, | Performed by: INTERNAL MEDICINE

## 2022-03-14 PROCEDURE — 1160F PR REVIEW ALL MEDS BY PRESCRIBER/CLIN PHARMACIST DOCUMENTED: ICD-10-PCS | Mod: CPTII,S$GLB,, | Performed by: INTERNAL MEDICINE

## 2022-03-14 PROCEDURE — 99214 OFFICE O/P EST MOD 30 MIN: CPT | Mod: S$GLB,,, | Performed by: INTERNAL MEDICINE

## 2022-03-14 PROCEDURE — 3078F PR MOST RECENT DIASTOLIC BLOOD PRESSURE < 80 MM HG: ICD-10-PCS | Mod: CPTII,S$GLB,, | Performed by: INTERNAL MEDICINE

## 2022-03-14 PROCEDURE — 3074F SYST BP LT 130 MM HG: CPT | Mod: CPTII,S$GLB,, | Performed by: INTERNAL MEDICINE

## 2022-03-14 RX ORDER — LISINOPRIL 20 MG/1
20 TABLET ORAL DAILY
Qty: 90 TABLET | Refills: 3 | Status: SHIPPED | OUTPATIENT
Start: 2022-03-14 | End: 2023-05-29 | Stop reason: SDUPTHER

## 2022-03-14 NOTE — PATIENT INSTRUCTIONS
For diabetes, last a1c still great. Consistent with diet controlled diabetes. Keep up the efforts there.    Keep up with eye exams once a year, etc.    Blood pressure normal, keep doing what you're doing there as well.    For cholesterol, can use atorvastatin 20 or 40 mg/day if interested. Otherwise recheck after 6-12 months.    Heart risk 10.4%, medication recommended over 7.5%, so want to see how things are looking and consider medication

## 2022-03-14 NOTE — ASSESSMENT & PLAN NOTE
Lab Results   Component Value Date    LDLCALC 135.2 03/07/2022   Above goal for diabetes  ASCVD risk high. Discussed with pt recommendation for stat  Pt not interested at this time, prefers to work on diet/exercise and recheck in 6 months, reconsider at that time. Orders in

## 2022-03-14 NOTE — ASSESSMENT & PLAN NOTE
Pt reported hx diabetes for over 5 years, prediabetes before   - no records of that available to review   - some information in the CareEverywhere section of Epic.   - highest A1C I see there is a 1x result up to 6.6, barely in the diabetes range, prediabetes range otherwise   - was on metformin 250 mg once a day. Very low dose. A1C was below goal, recommended stopping. A1C remained reasonable.     - at this point, pt continues to have diet controlled diabetes   - reviewed importance of continuing periodic f/u for eye and foot exams.   - keep working on having a healthy diet as well as exercise habits   - discussed with patient, she can keep f/u with PCP to monitor the diabetes at this point, labs 1-2 times a year would be sufficient. But if following up here for lipids, can check on a1c as well.

## 2022-03-14 NOTE — PROGRESS NOTES
Subjective:      Chief Complaint: Diabetes (DM2)    HPI: Teresa Trujillo is a 57 y.o. female who is here for a follow-up evaluation for diabetes. Last seen 2021. Had recommended f/u with PCP. Pt returned to f/u here.    With regards to the diabetes:  Diagnosed with T2DM since around 5 years ago. Prediabetes before that. Thinks A1C was in the 7s at one time.     Known complications:    Retinopathy? No. Pt reported she had an eye exam Oct 2020.    Nephropathy? No    Neuropathy? In the past but not lately.    CAD? no    CVA? No     Current regimen:   None lately.     Missed doses? Not lately.     Prior treatments:    Metformin 250 mg once a day (side effects with higher doses)    Self-Monitored blood glucose.  # times a day testing: occasional  Log reviewed: No    Fastin-130s mostly    Hypoglycemic events? None    Saw DM education 2020.    Working on diet, exercise.   avoids fast food, sodas, decrease carbs/snacks.    Current symptoms:  Ear trouble. Seeing ENT. Headaches, pending neurology evaluation.   sometimes vision changes (eye irritation, feeling like something in the eye). Pending f/u later this week    Sometimes loose stool in AM.  Lost about 10 lbs int the last year or so    Pt denies:   polyuria, polydipsia, nausea, vomit and constipation    The 10-year ASCVD risk score (Kitty TIFFANY Jr., et al., 2013) is: 10.4%    Values used to calculate the score:      Age: 57 years      Sex: Female      Is Non- : Yes      Diabetic: Yes      Tobacco smoker: No      Systolic Blood Pressure: 120 mmHg      Is BP treated: Yes      HDL Cholesterol: 68 mg/dL      Total Cholesterol: 235 mg/dL    Diabetes Management Status    Statin: Not taking  ACE/ARB: Taking    Screening or Prevention Patient's value Goal Complete/Controlled?   HgA1C Testing and Control   Lab Results   Component Value Date    HGBA1C 6.2 (H) 2022      q6months/Less than 7% Yes   Lipid profile : 2022 Annually No    LDL control Lab Results   Component Value Date    LDLCALC 135.2 03/07/2022    Annually/Less than 100 mg/dl  No   Nephropathy screening No results found for: MICALBCREAT  Lab Results   Component Value Date    CREATININE 0.8 03/07/2022     Lab Results   Component Value Date    PROTEINUA Negative 12/02/2019    Annually No   Blood pressure BP Readings from Last 1 Encounters:   03/14/22 120/72    Less than 140/90 No   Dilated retinal exam Most Recent Eye Exam Date: Not Found Annually Yes   Foot exam   Most Recent Foot Exam Date: Not Found Annually Yes     Reviewed past medical, family, social history and updated as appropriate.    Review of Systems   As above    Objective:     Vitals:    03/14/22 1134   BP: 120/72   Pulse: 87     BP Readings from Last 5 Encounters:   03/14/22 120/72   03/07/22 110/75   02/03/22 125/78   01/31/22 (!) 162/96   10/14/21 122/79     Physical Exam  Vitals reviewed.   Constitutional:       General: She is not in acute distress.  Neck:      Thyroid: No thyromegaly.   Cardiovascular:      Heart sounds: Normal heart sounds.   Pulmonary:      Effort: Pulmonary effort is normal.       Wt Readings from Last 10 Encounters:   03/14/22 1134 62 kg (136 lb 11 oz)   03/07/22 1052 62 kg (136 lb 11.2 oz)   02/03/22 1609 63 kg (138 lb 14.2 oz)   01/31/22 1053 63.8 kg (140 lb 11.2 oz)   10/14/21 1516 64.4 kg (141 lb 15.6 oz)   02/17/21 1004 67.2 kg (148 lb 2.4 oz)   09/22/20 1023 65.4 kg (144 lb 2.9 oz)   08/04/20 0924 66.3 kg (146 lb 2.6 oz)   07/17/20 1316 68.4 kg (150 lb 12.7 oz)   01/23/20 1452 65.3 kg (144 lb)       Lab Results   Component Value Date    HGBA1C 6.2 (H) 03/07/2022     Lab Results   Component Value Date    CHOL 235 (H) 03/07/2022    HDL 68 03/07/2022    LDLCALC 135.2 03/07/2022    TRIG 159 (H) 03/07/2022    CHOLHDL 28.9 03/07/2022     Lab Results   Component Value Date     03/07/2022    K 3.8 03/07/2022     03/07/2022    CO2 23 03/07/2022     (H) 03/07/2022    BUN 9  03/07/2022    CREATININE 0.8 03/07/2022    CALCIUM 10.3 03/07/2022    PROT 8.7 (H) 12/02/2019    ALBUMIN 4.2 12/02/2019    BILITOT 0.5 12/02/2019    ALKPHOS 72 12/02/2019    AST 29 12/02/2019    ALT 10 12/02/2019    ANIONGAP 10 03/07/2022    ESTGFRAFRICA >60 03/07/2022    EGFRNONAA >60 03/07/2022    TSH 0.674 03/07/2022      Assessment/Plan:     Type 2 diabetes mellitus without complication, without long-term current use of insulin  Pt reported hx diabetes for over 5 years, prediabetes before   - no records of that available to review   - some information in the CareEverywhere section of Epic.   - highest A1C I see there is a 1x result up to 6.6, barely in the diabetes range, prediabetes range otherwise   - was on metformin 250 mg once a day. Very low dose. A1C was below goal, recommended stopping. A1C remained reasonable.     - at this point, pt continues to have diet controlled diabetes   - reviewed importance of continuing periodic f/u for eye and foot exams.   - keep working on having a healthy diet as well as exercise habits   - discussed with patient, she can keep f/u with PCP to monitor the diabetes at this point, labs 1-2 times a year would be sufficient. But if following up here for lipids, can check on a1c as well.      Essential hypertension  bp okay today   continue medication   f/u with PCP, monitor BP      Mixed hyperlipidemia  Lab Results   Component Value Date    LDLCALC 135.2 03/07/2022   Above goal for diabetes  ASCVD risk high. Discussed with pt recommendation for stat  Pt not interested at this time, prefers to work on diet/exercise and recheck in 6 months, reconsider at that time. Orders in        Follow up in 1 year (on 3/14/2023) for lab review, further monitoring.      Keith De León MD  Endocrinology

## 2022-03-28 NOTE — PROGRESS NOTES
Subjective:       Patient ID: Teresa Trujillo is a 57 y.o. female.    Chief Complaint: Follow-up (and had audiogram)    Last seen as a new patient on 01/31/2022 with history as follows:    She is a new patient for me here today complaining of ear pain.  States got both her COVID booster and flu shot on 10/28/2021.  The next day began with ear pain with no constitutional or other symptoms.  Otalgia has continued since then though overall improved.  Present bilaterally all the time to varying degrees.  Not aware of any triggers or alleviating factors.  Had dental work consisting of a root canal left upper molar in September.  No dental complaints.  Not aware of clenching.  Used to chew gum but less now.  In addition to pain and discomfort feels fullness and stuffiness in the ears.  No sinonasal symptoms except drippy nose, more so with masking.  Has been on cetirizine which has helped some.  No air travel.  Occasional buzzing in the ears AU.  No prior otologic history.  Feels fine otherwise.  Medical history includes migraine, type 2 DM, hypertension.      Interval history:    At her last visit her ear exam was normal bilaterally.  There was crepitus at TMJs bilaterally.  Potential sources of symptoms were reviewed and recommendations.  This included TMJ measures, continue levocetirizine, add fluticasone nasal spray, schedule audiogram, recheck in 3 weeks.  Continues to note some pain/discomfort and fullness, pressure, stuffiness in ears.  Used to awaken with ear pain but no longer.  Massages behind her ears for relief.  Not chewing gum.  No dental complaints.  Occasionally tried a nightguard.  Nose feels fine except a little runny in the morning.  Did not try fluticasone, dislikes sprays.  Some acoustical trauma with sporting events, [employer] games.  Occasional tinnitus AU.  Had audiogram since last visit.  No new complaints.        Review of Systems     Constitutional: Negative for fever.      HENT: Positive for  ear pain, ringing in the ears and runny nose.  Negative for ear discharge, hearing loss, sore throat, stuffy nose, trouble swallowing and voice change.      Respiratory:  Negative for cough and shortness of breath.      Cardiovascular:  Negative for chest pain.     Gastrointestinal:  Negative for abdominal pain.     Neurological: Negative for dizziness.      Hematologic: Negative for swollen glands.                Objective:        Vitals:    03/07/22 1052   BP: 110/75   Pulse: 72   Temp: 99 °F (37.2 °C)     Body mass index is 20.19 kg/m².  Physical Exam  Constitutional:       General: She is not in acute distress.     Appearance: She is well-developed.   HENT:      Head: Normocephalic and atraumatic.      Right Ear: Tympanic membrane, ear canal and external ear normal.      Left Ear: Tympanic membrane, ear canal and external ear normal.      Nose: No nasal deformity, mucosal edema or rhinorrhea.      Mouth/Throat:      Mouth: Mucous membranes are moist.      Pharynx: No pharyngeal swelling, oropharyngeal exudate or posterior oropharyngeal erythema.      Comments:   Crepitus at TMJs bilaterally.  Neck:      Trachea: Phonation normal.   Pulmonary:      Effort: Pulmonary effort is normal. No respiratory distress.   Musculoskeletal:      Cervical back: Neck supple.   Lymphadenopathy:      Cervical: No cervical adenopathy.   Skin:     General: Skin is warm and dry.   Neurological:      Mental Status: She is alert and oriented to person, place, and time.   Psychiatric:         Speech: Speech normal.         Behavior: Behavior normal.         Tests / Results:        Reviewed/discussed above audiogram which demonstrates mild sensorineural hearing loss bilaterally, good speech discrimination bilaterally, normal middle ear pressure bilaterally.          Assessment:       1. Otalgia of both ears    2. Sensation of fullness in both ears    3. TMJ crepitus    4. Normal middle ear transmission by tympanogram    5. Mild hearing  loss        Plan:       Reviewed all above and considerations and recommendations and answered questions.  Discussed likely sources of otalgia and fullness with normal exam and normal middle ear pressures, in particular referred from jaw/TMJ/muscle tension.  Options reviewed and will proceed as follows:    Continue to work on TMJ measures as reviewed.  Consider nightguard.  Continue levocetirizine every evening.  Hearing protection.  Tinnitus printout.  Follow-up if change or problems and 6-8 weeks.

## 2022-04-07 ENCOUNTER — OFFICE VISIT (OUTPATIENT)
Dept: NEUROLOGY | Facility: CLINIC | Age: 57
End: 2022-04-07
Payer: COMMERCIAL

## 2022-04-07 VITALS
HEIGHT: 69 IN | BODY MASS INDEX: 20.44 KG/M2 | WEIGHT: 138 LBS | SYSTOLIC BLOOD PRESSURE: 163 MMHG | DIASTOLIC BLOOD PRESSURE: 63 MMHG | HEART RATE: 86 BPM

## 2022-04-07 DIAGNOSIS — E11.42 DIABETIC POLYNEUROPATHY ASSOCIATED WITH TYPE 2 DIABETES MELLITUS: ICD-10-CM

## 2022-04-07 DIAGNOSIS — I10 ESSENTIAL HYPERTENSION: ICD-10-CM

## 2022-04-07 DIAGNOSIS — G43.009 MIGRAINE WITHOUT AURA AND WITHOUT STATUS MIGRAINOSUS, NOT INTRACTABLE: Primary | ICD-10-CM

## 2022-04-07 PROCEDURE — 3077F PR MOST RECENT SYSTOLIC BLOOD PRESSURE >= 140 MM HG: ICD-10-PCS | Mod: CPTII,S$GLB,, | Performed by: PSYCHIATRY & NEUROLOGY

## 2022-04-07 PROCEDURE — 3044F PR MOST RECENT HEMOGLOBIN A1C LEVEL <7.0%: ICD-10-PCS | Mod: CPTII,S$GLB,, | Performed by: PSYCHIATRY & NEUROLOGY

## 2022-04-07 PROCEDURE — 3044F HG A1C LEVEL LT 7.0%: CPT | Mod: CPTII,S$GLB,, | Performed by: PSYCHIATRY & NEUROLOGY

## 2022-04-07 PROCEDURE — 99214 OFFICE O/P EST MOD 30 MIN: CPT | Mod: S$GLB,,, | Performed by: PSYCHIATRY & NEUROLOGY

## 2022-04-07 PROCEDURE — 3078F DIAST BP <80 MM HG: CPT | Mod: CPTII,S$GLB,, | Performed by: PSYCHIATRY & NEUROLOGY

## 2022-04-07 PROCEDURE — 4010F ACE/ARB THERAPY RXD/TAKEN: CPT | Mod: CPTII,S$GLB,, | Performed by: PSYCHIATRY & NEUROLOGY

## 2022-04-07 PROCEDURE — 3008F PR BODY MASS INDEX (BMI) DOCUMENTED: ICD-10-PCS | Mod: CPTII,S$GLB,, | Performed by: PSYCHIATRY & NEUROLOGY

## 2022-04-07 PROCEDURE — 1159F PR MEDICATION LIST DOCUMENTED IN MEDICAL RECORD: ICD-10-PCS | Mod: CPTII,S$GLB,, | Performed by: PSYCHIATRY & NEUROLOGY

## 2022-04-07 PROCEDURE — 3008F BODY MASS INDEX DOCD: CPT | Mod: CPTII,S$GLB,, | Performed by: PSYCHIATRY & NEUROLOGY

## 2022-04-07 PROCEDURE — 4010F PR ACE/ARB THEARPY RXD/TAKEN: ICD-10-PCS | Mod: CPTII,S$GLB,, | Performed by: PSYCHIATRY & NEUROLOGY

## 2022-04-07 PROCEDURE — 99214 PR OFFICE/OUTPT VISIT, EST, LEVL IV, 30-39 MIN: ICD-10-PCS | Mod: S$GLB,,, | Performed by: PSYCHIATRY & NEUROLOGY

## 2022-04-07 PROCEDURE — 3078F PR MOST RECENT DIASTOLIC BLOOD PRESSURE < 80 MM HG: ICD-10-PCS | Mod: CPTII,S$GLB,, | Performed by: PSYCHIATRY & NEUROLOGY

## 2022-04-07 PROCEDURE — 3077F SYST BP >= 140 MM HG: CPT | Mod: CPTII,S$GLB,, | Performed by: PSYCHIATRY & NEUROLOGY

## 2022-04-07 PROCEDURE — 99999 PR PBB SHADOW E&M-EST. PATIENT-LVL III: CPT | Mod: PBBFAC,,, | Performed by: PSYCHIATRY & NEUROLOGY

## 2022-04-07 PROCEDURE — 99999 PR PBB SHADOW E&M-EST. PATIENT-LVL III: ICD-10-PCS | Mod: PBBFAC,,, | Performed by: PSYCHIATRY & NEUROLOGY

## 2022-04-07 PROCEDURE — 1159F MED LIST DOCD IN RCRD: CPT | Mod: CPTII,S$GLB,, | Performed by: PSYCHIATRY & NEUROLOGY

## 2022-04-07 NOTE — PROGRESS NOTES
Neurology Clinic Follow-Up Note    Impression:  1. Episodic migraine without aura  2. Mild diabetic polyneuropathy  3. HTN: above target    Plan:  1. Continue topiramate 50mg bid for HA prophylaxis; we discussed the option to taper at any time  2. Continue Imitrex 50mg (1/2 of 100mg) po prn  3. She will update me via MyChart if not improving  4. [facility]  5. Home BPs  6. F/U with PCP re BP  7. RTC 12 months or sooner, prn      Problem List Items Addressed This Visit        1 - High    Migraine without aura and without status migrainosus, not intractable - Primary       2     Diabetic polyneuropathy associated with type 2 diabetes mellitus       3     Essential hypertension        Patient returns for follow-up of above.  She is doing well on topiramate without side effects.  Imitrex 100mg was effective but caused throat tightness but she responds well to 50mg daily.        Freq: 2/30 and 0/30    Abortives   Effective: ibuprofen/naproxen (variably), Imitrex 50mg (100mg effective but throat tightness)   Ineffective/not tolerated: Fioricet    Prophylactics    Effective: topiramate    No change in neuropathy symptoms  She is having B ear pain      HPI:  57 y/o AAF referred for evaluation and treatment of headaches.   She has a long-standing history (years) of episodic headache (diagnosed as migraine)    4 days ago, she had a severe headache with B neck pain and headache.  Similar symptoms twice in the past. Ibuprofen helped    Associated symptoms: nausea (occasional), +photo/-phonophobia, + unilateral throbbing  Duration: hours to all day  No aura  No interictal neck pain.  No sleep issues      Past Medical History:   Diagnosis Date    Diabetes mellitus, type 2     HTN (hypertension)     Hyperlipidemia     Migraine        Outpatient Medications Marked as Taking for the 4/7/22 encounter (Office Visit) with Jairo Burgess MD   Medication Sig Dispense Refill    blood sugar diagnostic (ONETOUCH  "VERIO TEST STRIPS) Strp Use to test glucose once a day 100 strip 11    lancets (ONETOUCH DELICA PLUS LANCET) 33 gauge Misc Use to test glucose once daily 100 each 11    lisinopriL (PRINIVIL,ZESTRIL) 20 MG tablet Take 1 tablet (20 mg total) by mouth once daily. 90 tablet 3    meloxicam (MOBIC) 15 MG tablet Take 15 mg by mouth continuous prn.      norethindrone ac-eth estradiol (AUROVELA 1/20, 21, ORAL) Take by mouth once daily.      pantoprazole (PROTONIX) 40 MG tablet Take 40 mg by mouth once daily.       sumatriptan (IMITREX) 100 MG tablet Take 1 tablet (100 mg total) by mouth every 2 (two) hours as needed for Migraine (Do not exceed 200mg in 24 hours). 27 tablet 3    topiramate (TOPAMAX) 50 MG tablet Take 1 tablet (50 mg total) by mouth 2 (two) times daily. 60 tablet 5       BP (!) 163/63 (BP Location: Right arm, Patient Position: Sitting, BP Method: Large (Automatic))   Pulse 86   Ht 5' 9" (1.753 m)   Wt 62.6 kg (138 lb)   BMI 20.38 kg/m²   Well-developed, well nourished.  Awake, alert and oriented.  Language is normal.  EOMF without nystagmus, VFF, facial movements intact.  No UE drift.  No extinction to double simultaneous tactile stimulation.  Glove-stocking decrease PP and trace AJs. Muscle power is normal.  Gait is steady.    30 mins chart review, face to face, documentation    Jairo Burgess MD      "

## 2022-04-29 ENCOUNTER — PATIENT MESSAGE (OUTPATIENT)
Dept: ADMINISTRATIVE | Facility: OTHER | Age: 57
End: 2022-04-29
Payer: COMMERCIAL

## 2022-06-22 ENCOUNTER — OFFICE VISIT (OUTPATIENT)
Dept: INTERNAL MEDICINE | Facility: CLINIC | Age: 57
End: 2022-06-22
Payer: COMMERCIAL

## 2022-06-22 ENCOUNTER — LAB VISIT (OUTPATIENT)
Dept: LAB | Facility: HOSPITAL | Age: 57
End: 2022-06-22
Attending: INTERNAL MEDICINE
Payer: COMMERCIAL

## 2022-06-22 ENCOUNTER — IMMUNIZATION (OUTPATIENT)
Dept: INTERNAL MEDICINE | Facility: CLINIC | Age: 57
End: 2022-06-22
Payer: COMMERCIAL

## 2022-06-22 VITALS
DIASTOLIC BLOOD PRESSURE: 76 MMHG | HEART RATE: 84 BPM | WEIGHT: 133.38 LBS | BODY MASS INDEX: 19.76 KG/M2 | OXYGEN SATURATION: 98 % | SYSTOLIC BLOOD PRESSURE: 128 MMHG | HEIGHT: 69 IN

## 2022-06-22 DIAGNOSIS — I10 ESSENTIAL HYPERTENSION: ICD-10-CM

## 2022-06-22 DIAGNOSIS — Z76.89 ENCOUNTER TO ESTABLISH CARE: Primary | ICD-10-CM

## 2022-06-22 DIAGNOSIS — Z12.11 COLON CANCER SCREENING: ICD-10-CM

## 2022-06-22 DIAGNOSIS — E78.2 MIXED HYPERLIPIDEMIA: ICD-10-CM

## 2022-06-22 DIAGNOSIS — G43.009 MIGRAINE WITHOUT AURA AND WITHOUT STATUS MIGRAINOSUS, NOT INTRACTABLE: ICD-10-CM

## 2022-06-22 DIAGNOSIS — E11.9 TYPE 2 DIABETES MELLITUS WITHOUT COMPLICATION, WITHOUT LONG-TERM CURRENT USE OF INSULIN: ICD-10-CM

## 2022-06-22 DIAGNOSIS — Z23 NEED FOR VACCINATION: Primary | ICD-10-CM

## 2022-06-22 LAB
ALBUMIN/CREAT UR: 10.6 UG/MG (ref 0–30)
BACTERIA #/AREA URNS AUTO: NORMAL /HPF
BASOPHILS # BLD AUTO: 0.02 K/UL (ref 0–0.2)
BASOPHILS NFR BLD: 0.5 % (ref 0–1.9)
BILIRUB UR QL STRIP: NEGATIVE
CLARITY UR REFRACT.AUTO: ABNORMAL
COLOR UR AUTO: YELLOW
CREAT UR-MCNC: 226 MG/DL (ref 15–325)
DIFFERENTIAL METHOD: ABNORMAL
EOSINOPHIL # BLD AUTO: 0 K/UL (ref 0–0.5)
EOSINOPHIL NFR BLD: 0.3 % (ref 0–8)
ERYTHROCYTE [DISTWIDTH] IN BLOOD BY AUTOMATED COUNT: 12.8 % (ref 11.5–14.5)
GLUCOSE UR QL STRIP: NEGATIVE
HCT VFR BLD AUTO: 38.5 % (ref 37–48.5)
HGB BLD-MCNC: 12.3 G/DL (ref 12–16)
HGB UR QL STRIP: NEGATIVE
IMM GRANULOCYTES # BLD AUTO: 0 K/UL (ref 0–0.04)
IMM GRANULOCYTES NFR BLD AUTO: 0 % (ref 0–0.5)
KETONES UR QL STRIP: ABNORMAL
LEUKOCYTE ESTERASE UR QL STRIP: NEGATIVE
LYMPHOCYTES # BLD AUTO: 1.7 K/UL (ref 1–4.8)
LYMPHOCYTES NFR BLD: 45.2 % (ref 18–48)
MCH RBC QN AUTO: 32.7 PG (ref 27–31)
MCHC RBC AUTO-ENTMCNC: 31.9 G/DL (ref 32–36)
MCV RBC AUTO: 102 FL (ref 82–98)
MICROALBUMIN UR DL<=1MG/L-MCNC: 24 UG/ML
MICROSCOPIC COMMENT: NORMAL
MONOCYTES # BLD AUTO: 0.4 K/UL (ref 0.3–1)
MONOCYTES NFR BLD: 9.1 % (ref 4–15)
NEUTROPHILS # BLD AUTO: 1.7 K/UL (ref 1.8–7.7)
NEUTROPHILS NFR BLD: 44.9 % (ref 38–73)
NITRITE UR QL STRIP: NEGATIVE
NRBC BLD-RTO: 0 /100 WBC
PH UR STRIP: 5 [PH] (ref 5–8)
PLATELET # BLD AUTO: 216 K/UL (ref 150–450)
PMV BLD AUTO: 12.8 FL (ref 9.2–12.9)
PROT UR QL STRIP: NEGATIVE
RBC # BLD AUTO: 3.76 M/UL (ref 4–5.4)
RBC #/AREA URNS AUTO: 2 /HPF (ref 0–4)
SP GR UR STRIP: 1.02 (ref 1–1.03)
SQUAMOUS #/AREA URNS AUTO: 1 /HPF
URN SPEC COLLECT METH UR: ABNORMAL
WBC # BLD AUTO: 3.85 K/UL (ref 3.9–12.7)
WBC #/AREA URNS AUTO: 2 /HPF (ref 0–5)

## 2022-06-22 PROCEDURE — 3066F NEPHROPATHY DOC TX: CPT | Mod: CPTII,S$GLB,, | Performed by: INTERNAL MEDICINE

## 2022-06-22 PROCEDURE — 3078F DIAST BP <80 MM HG: CPT | Mod: CPTII,S$GLB,, | Performed by: INTERNAL MEDICINE

## 2022-06-22 PROCEDURE — 4010F ACE/ARB THERAPY RXD/TAKEN: CPT | Mod: CPTII,S$GLB,, | Performed by: INTERNAL MEDICINE

## 2022-06-22 PROCEDURE — 85025 COMPLETE CBC W/AUTO DIFF WBC: CPT | Performed by: INTERNAL MEDICINE

## 2022-06-22 PROCEDURE — 91305 COVID-19, MRNA, LNP-S, PF, 30 MCG/0.3 ML DOSE VACCINE (PFIZER): CPT | Mod: PBBFAC | Performed by: INTERNAL MEDICINE

## 2022-06-22 PROCEDURE — 1160F PR REVIEW ALL MEDS BY PRESCRIBER/CLIN PHARMACIST DOCUMENTED: ICD-10-PCS | Mod: CPTII,S$GLB,, | Performed by: INTERNAL MEDICINE

## 2022-06-22 PROCEDURE — 3074F PR MOST RECENT SYSTOLIC BLOOD PRESSURE < 130 MM HG: ICD-10-PCS | Mod: CPTII,S$GLB,, | Performed by: INTERNAL MEDICINE

## 2022-06-22 PROCEDURE — 99204 PR OFFICE/OUTPT VISIT, NEW, LEVL IV, 45-59 MIN: ICD-10-PCS | Mod: S$GLB,,, | Performed by: INTERNAL MEDICINE

## 2022-06-22 PROCEDURE — 3074F SYST BP LT 130 MM HG: CPT | Mod: CPTII,S$GLB,, | Performed by: INTERNAL MEDICINE

## 2022-06-22 PROCEDURE — 1159F PR MEDICATION LIST DOCUMENTED IN MEDICAL RECORD: ICD-10-PCS | Mod: CPTII,S$GLB,, | Performed by: INTERNAL MEDICINE

## 2022-06-22 PROCEDURE — 81001 URINALYSIS AUTO W/SCOPE: CPT | Performed by: INTERNAL MEDICINE

## 2022-06-22 PROCEDURE — 99999 PR PBB SHADOW E&M-EST. PATIENT-LVL IV: ICD-10-PCS | Mod: PBBFAC,,, | Performed by: INTERNAL MEDICINE

## 2022-06-22 PROCEDURE — 1159F MED LIST DOCD IN RCRD: CPT | Mod: CPTII,S$GLB,, | Performed by: INTERNAL MEDICINE

## 2022-06-22 PROCEDURE — 3061F PR NEG MICROALBUMINURIA RESULT DOCUMENTED/REVIEW: ICD-10-PCS | Mod: CPTII,S$GLB,, | Performed by: INTERNAL MEDICINE

## 2022-06-22 PROCEDURE — 82570 ASSAY OF URINE CREATININE: CPT | Performed by: INTERNAL MEDICINE

## 2022-06-22 PROCEDURE — 99204 OFFICE O/P NEW MOD 45 MIN: CPT | Mod: S$GLB,,, | Performed by: INTERNAL MEDICINE

## 2022-06-22 PROCEDURE — 1160F RVW MEDS BY RX/DR IN RCRD: CPT | Mod: CPTII,S$GLB,, | Performed by: INTERNAL MEDICINE

## 2022-06-22 PROCEDURE — 3044F PR MOST RECENT HEMOGLOBIN A1C LEVEL <7.0%: ICD-10-PCS | Mod: CPTII,S$GLB,, | Performed by: INTERNAL MEDICINE

## 2022-06-22 PROCEDURE — 3061F NEG MICROALBUMINURIA REV: CPT | Mod: CPTII,S$GLB,, | Performed by: INTERNAL MEDICINE

## 2022-06-22 PROCEDURE — 3008F PR BODY MASS INDEX (BMI) DOCUMENTED: ICD-10-PCS | Mod: CPTII,S$GLB,, | Performed by: INTERNAL MEDICINE

## 2022-06-22 PROCEDURE — 3078F PR MOST RECENT DIASTOLIC BLOOD PRESSURE < 80 MM HG: ICD-10-PCS | Mod: CPTII,S$GLB,, | Performed by: INTERNAL MEDICINE

## 2022-06-22 PROCEDURE — 4010F PR ACE/ARB THEARPY RXD/TAKEN: ICD-10-PCS | Mod: CPTII,S$GLB,, | Performed by: INTERNAL MEDICINE

## 2022-06-22 PROCEDURE — 36415 COLL VENOUS BLD VENIPUNCTURE: CPT | Performed by: INTERNAL MEDICINE

## 2022-06-22 PROCEDURE — 3008F BODY MASS INDEX DOCD: CPT | Mod: CPTII,S$GLB,, | Performed by: INTERNAL MEDICINE

## 2022-06-22 PROCEDURE — 3044F HG A1C LEVEL LT 7.0%: CPT | Mod: CPTII,S$GLB,, | Performed by: INTERNAL MEDICINE

## 2022-06-22 PROCEDURE — 99999 PR PBB SHADOW E&M-EST. PATIENT-LVL IV: CPT | Mod: PBBFAC,,, | Performed by: INTERNAL MEDICINE

## 2022-06-22 PROCEDURE — 3066F PR DOCUMENTATION OF TREATMENT FOR NEPHROPATHY: ICD-10-PCS | Mod: CPTII,S$GLB,, | Performed by: INTERNAL MEDICINE

## 2022-06-22 RX ORDER — LEVOCETIRIZINE DIHYDROCHLORIDE 5 MG/1
5 TABLET, FILM COATED ORAL NIGHTLY
COMMUNITY
End: 2024-02-02 | Stop reason: SDUPTHER

## 2022-06-26 DIAGNOSIS — D53.1 MEGALOBLASTIC ANEMIA: Primary | ICD-10-CM

## 2022-06-26 NOTE — PROGRESS NOTES
CC:  Establishing care    HPI:  The patient is a 57-year-old female with migraine headaches, 9 insulin-dependent diabetes, hypertension and hyperlipidemia who presents today to establish care.  The patient is being seen by Neurology for her migraine headaches.  She is on Topamax and Imitrex a migraines under control.  She does have diabetes on diet alone.  She is on lisinopril for blood pressure.  No problems medications reported.  The patient is taking Protonix every other day for her stomach.  She had an EGD on Friday.  The results are pending.    ROS:  Patient reports some weight loss.  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  She does have a humming sensation in both ears.  No chest pain.  No shortness of breath.  She does exercise by walking on a treadmill or doing the elliptical machine.  She also walks in [email or web address] with her .  No skin changes.  No breast changes.  No numbness or tingling of the arms or legs.  No weakness in the arms and legs.  She had a mamm[facility].  Her last colonoscopy was 7 years ago.    Physical exam:  General appearance:  No acute distress  HEENT:  Conjunctivae is clear.  Pupils equal and reactive.  TMs are clear.  Nasal septum is midline without discharge.  Oropharynx without erythema.  Trachea is midline without JVD or thyromegaly.  Pulmonary:  Good inspiratory, expiratory breath sounds are heard.  Lungs clear auscultation.  Cardiovascular:  2 + carotid pulses neck bruits.  S1-S2 without murmur or gallop.  Extremities without edema.  GI:  Abdomen was nontender, nondistended without hepatosplenomegaly  Lymphatics:  No cervical or axillary adenopathy  Comments:  Patient's lab results reviewed.  Her last A1c was 6.2.  Total cholesterol was 235. LDL was 135.2 and triglycerides were 169.    Assessment:  1. Establishing care  2. Hypertension currently stable  3. Hyperlipidemia  4. Migraine headaches  5. Diet-controlled  diabetes    Plan:  1. Will check a CBC as was urine for microalbumin.

## 2022-06-27 ENCOUNTER — TELEPHONE (OUTPATIENT)
Dept: INTERNAL MEDICINE | Facility: CLINIC | Age: 57
End: 2022-06-27
Payer: COMMERCIAL

## 2022-06-27 ENCOUNTER — PATIENT MESSAGE (OUTPATIENT)
Dept: INTERNAL MEDICINE | Facility: CLINIC | Age: 57
End: 2022-06-27
Payer: COMMERCIAL

## 2022-06-27 ENCOUNTER — LAB VISIT (OUTPATIENT)
Dept: LAB | Facility: OTHER | Age: 57
End: 2022-06-27
Attending: INTERNAL MEDICINE
Payer: COMMERCIAL

## 2022-06-27 DIAGNOSIS — D53.1 MEGALOBLASTIC ANEMIA: ICD-10-CM

## 2022-06-27 NOTE — TELEPHONE ENCOUNTER
----- Message from Keith Freeman MD sent at 6/26/2022  7:41 AM CDT -----  Her blood cells were a little larger than normal. I would like to check a serum iron, TIBC, ferritin, B-12 and a stoo for occult blood.

## 2022-06-29 DIAGNOSIS — Z12.31 OTHER SCREENING MAMMOGRAM: ICD-10-CM

## 2022-07-13 ENCOUNTER — PATIENT MESSAGE (OUTPATIENT)
Dept: ADMINISTRATIVE | Facility: HOSPITAL | Age: 57
End: 2022-07-13
Payer: COMMERCIAL

## 2022-09-09 ENCOUNTER — PATIENT MESSAGE (OUTPATIENT)
Dept: ENDOCRINOLOGY | Facility: CLINIC | Age: 57
End: 2022-09-09
Payer: COMMERCIAL

## 2022-09-14 ENCOUNTER — TELEPHONE (OUTPATIENT)
Dept: NEUROLOGY | Facility: CLINIC | Age: 57
End: 2022-09-14
Payer: COMMERCIAL

## 2022-09-14 ENCOUNTER — LAB VISIT (OUTPATIENT)
Dept: LAB | Facility: OTHER | Age: 57
End: 2022-09-14
Attending: INTERNAL MEDICINE
Payer: COMMERCIAL

## 2022-09-14 DIAGNOSIS — E11.9 TYPE 2 DIABETES MELLITUS WITHOUT COMPLICATION, WITHOUT LONG-TERM CURRENT USE OF INSULIN: ICD-10-CM

## 2022-09-14 DIAGNOSIS — E78.2 MIXED HYPERLIPIDEMIA: ICD-10-CM

## 2022-09-14 LAB
ANION GAP SERPL CALC-SCNC: 6 MMOL/L (ref 8–16)
BUN SERPL-MCNC: 13 MG/DL (ref 6–20)
CALCIUM SERPL-MCNC: 9.6 MG/DL (ref 8.7–10.5)
CHLORIDE SERPL-SCNC: 111 MMOL/L (ref 95–110)
CHOLEST SERPL-MCNC: 227 MG/DL (ref 120–199)
CHOLEST/HDLC SERPL: 3.3 {RATIO} (ref 2–5)
CO2 SERPL-SCNC: 21 MMOL/L (ref 23–29)
CREAT SERPL-MCNC: 0.7 MG/DL (ref 0.5–1.4)
EST. GFR  (NO RACE VARIABLE): >60 ML/MIN/1.73 M^2
ESTIMATED AVG GLUCOSE: 126 MG/DL (ref 68–131)
GLUCOSE SERPL-MCNC: 118 MG/DL (ref 70–110)
HBA1C MFR BLD: 6 % (ref 4–5.6)
HDLC SERPL-MCNC: 68 MG/DL (ref 40–75)
HDLC SERPL: 30 % (ref 20–50)
LDLC SERPL CALC-MCNC: 137 MG/DL (ref 63–159)
NONHDLC SERPL-MCNC: 159 MG/DL
POTASSIUM SERPL-SCNC: 3.8 MMOL/L (ref 3.5–5.1)
SODIUM SERPL-SCNC: 138 MMOL/L (ref 136–145)
TRIGL SERPL-MCNC: 110 MG/DL (ref 30–150)

## 2022-09-14 PROCEDURE — 80061 LIPID PANEL: CPT | Performed by: INTERNAL MEDICINE

## 2022-09-14 PROCEDURE — 80048 BASIC METABOLIC PNL TOTAL CA: CPT | Performed by: INTERNAL MEDICINE

## 2022-09-14 PROCEDURE — 83036 HEMOGLOBIN GLYCOSYLATED A1C: CPT | Performed by: INTERNAL MEDICINE

## 2022-09-14 PROCEDURE — 36415 COLL VENOUS BLD VENIPUNCTURE: CPT | Performed by: INTERNAL MEDICINE

## 2022-09-14 NOTE — TELEPHONE ENCOUNTER
Spoke with pt-     I explained that she's due back for an appt 04/2023. She states that she's not having any issues and does not need to be seen sooner.

## 2022-09-14 NOTE — TELEPHONE ENCOUNTER
----- Message from Eleni Brooks sent at 9/13/2022  4:23 PM CDT -----  Contact: @ [phone]  Pt is calling to see if she can get a appointment for a follow up please call and adv @ [phone]

## 2022-09-16 ENCOUNTER — PATIENT MESSAGE (OUTPATIENT)
Dept: ENDOCRINOLOGY | Facility: CLINIC | Age: 57
End: 2022-09-16
Payer: COMMERCIAL

## 2022-09-20 ENCOUNTER — PATIENT MESSAGE (OUTPATIENT)
Dept: ADMINISTRATIVE | Facility: HOSPITAL | Age: 57
End: 2022-09-20
Payer: COMMERCIAL

## 2022-10-27 ENCOUNTER — IMMUNIZATION (OUTPATIENT)
Dept: INTERNAL MEDICINE | Facility: CLINIC | Age: 57
End: 2022-10-27
Payer: COMMERCIAL

## 2022-10-27 PROCEDURE — 90471 IMMUNIZATION ADMIN: CPT | Mod: S$GLB,,, | Performed by: INTERNAL MEDICINE

## 2022-10-27 PROCEDURE — 90686 FLU VACCINE (QUAD) GREATER THAN OR EQUAL TO 3YO PRESERVATIVE FREE IM: ICD-10-PCS | Mod: S$GLB,,, | Performed by: INTERNAL MEDICINE

## 2022-10-27 PROCEDURE — 90471 FLU VACCINE (QUAD) GREATER THAN OR EQUAL TO 3YO PRESERVATIVE FREE IM: ICD-10-PCS | Mod: S$GLB,,, | Performed by: INTERNAL MEDICINE

## 2022-10-27 PROCEDURE — 90686 IIV4 VACC NO PRSV 0.5 ML IM: CPT | Mod: S$GLB,,, | Performed by: INTERNAL MEDICINE

## 2022-11-04 ENCOUNTER — HOSPITAL ENCOUNTER (OUTPATIENT)
Dept: RADIOLOGY | Facility: OTHER | Age: 57
Discharge: HOME | End: 2022-11-04
Attending: OTOLARYNGOLOGY
Payer: COMMERCIAL

## 2022-11-04 DIAGNOSIS — R42 DIZZINESS AND GIDDINESS: ICD-10-CM

## 2022-11-04 PROCEDURE — 70480 CT TEMPORAL BONE WITHOUT CONTRAST: ICD-10-PCS | Mod: 26,,, | Performed by: RADIOLOGY

## 2022-11-04 PROCEDURE — 70480 CT ORBIT/EAR/FOSSA W/O DYE: CPT | Mod: TC

## 2022-11-04 PROCEDURE — 70480 CT ORBIT/EAR/FOSSA W/O DYE: CPT | Mod: 26,,, | Performed by: RADIOLOGY

## 2022-11-29 ENCOUNTER — IMMUNIZATION (OUTPATIENT)
Dept: PRIMARY CARE CLINIC | Facility: CLINIC | Age: 57
End: 2022-11-29
Payer: COMMERCIAL

## 2022-11-29 DIAGNOSIS — Z23 NEED FOR VACCINATION: Primary | ICD-10-CM

## 2022-11-29 PROCEDURE — 91312 COVID-19, MRNA, LNP-S, BIVALENT BOOSTER, PF, 30 MCG/0.3 ML DOSE: CPT | Mod: PBBFAC | Performed by: INTERNAL MEDICINE

## 2022-11-29 PROCEDURE — 0124A COVID-19, MRNA, LNP-S, BIVALENT BOOSTER, PF, 30 MCG/0.3 ML DOSE: CPT | Mod: CV19,PBBFAC | Performed by: INTERNAL MEDICINE

## 2023-01-12 ENCOUNTER — OFFICE VISIT (OUTPATIENT)
Dept: NEUROLOGY | Facility: CLINIC | Age: 58
End: 2023-01-12
Payer: COMMERCIAL

## 2023-01-12 VITALS
HEIGHT: 69 IN | DIASTOLIC BLOOD PRESSURE: 68 MMHG | HEART RATE: 77 BPM | BODY MASS INDEX: 19.37 KG/M2 | WEIGHT: 130.75 LBS | SYSTOLIC BLOOD PRESSURE: 100 MMHG

## 2023-01-12 DIAGNOSIS — I10 ESSENTIAL HYPERTENSION: ICD-10-CM

## 2023-01-12 DIAGNOSIS — E11.42 DIABETIC POLYNEUROPATHY ASSOCIATED WITH TYPE 2 DIABETES MELLITUS: ICD-10-CM

## 2023-01-12 DIAGNOSIS — G43.009 MIGRAINE WITHOUT AURA AND WITHOUT STATUS MIGRAINOSUS, NOT INTRACTABLE: Primary | ICD-10-CM

## 2023-01-12 PROCEDURE — 3074F SYST BP LT 130 MM HG: CPT | Mod: CPTII,S$GLB,, | Performed by: PSYCHIATRY & NEUROLOGY

## 2023-01-12 PROCEDURE — 3008F PR BODY MASS INDEX (BMI) DOCUMENTED: ICD-10-PCS | Mod: CPTII,S$GLB,, | Performed by: PSYCHIATRY & NEUROLOGY

## 2023-01-12 PROCEDURE — 1159F MED LIST DOCD IN RCRD: CPT | Mod: CPTII,S$GLB,, | Performed by: PSYCHIATRY & NEUROLOGY

## 2023-01-12 PROCEDURE — 99214 OFFICE O/P EST MOD 30 MIN: CPT | Mod: S$GLB,,, | Performed by: PSYCHIATRY & NEUROLOGY

## 2023-01-12 PROCEDURE — 99999 PR PBB SHADOW E&M-EST. PATIENT-LVL III: ICD-10-PCS | Mod: PBBFAC,,, | Performed by: PSYCHIATRY & NEUROLOGY

## 2023-01-12 PROCEDURE — 3074F PR MOST RECENT SYSTOLIC BLOOD PRESSURE < 130 MM HG: ICD-10-PCS | Mod: CPTII,S$GLB,, | Performed by: PSYCHIATRY & NEUROLOGY

## 2023-01-12 PROCEDURE — 1160F RVW MEDS BY RX/DR IN RCRD: CPT | Mod: CPTII,S$GLB,, | Performed by: PSYCHIATRY & NEUROLOGY

## 2023-01-12 PROCEDURE — 99214 PR OFFICE/OUTPT VISIT, EST, LEVL IV, 30-39 MIN: ICD-10-PCS | Mod: S$GLB,,, | Performed by: PSYCHIATRY & NEUROLOGY

## 2023-01-12 PROCEDURE — 1160F PR REVIEW ALL MEDS BY PRESCRIBER/CLIN PHARMACIST DOCUMENTED: ICD-10-PCS | Mod: CPTII,S$GLB,, | Performed by: PSYCHIATRY & NEUROLOGY

## 2023-01-12 PROCEDURE — 1159F PR MEDICATION LIST DOCUMENTED IN MEDICAL RECORD: ICD-10-PCS | Mod: CPTII,S$GLB,, | Performed by: PSYCHIATRY & NEUROLOGY

## 2023-01-12 PROCEDURE — 3078F DIAST BP <80 MM HG: CPT | Mod: CPTII,S$GLB,, | Performed by: PSYCHIATRY & NEUROLOGY

## 2023-01-12 PROCEDURE — 3078F PR MOST RECENT DIASTOLIC BLOOD PRESSURE < 80 MM HG: ICD-10-PCS | Mod: CPTII,S$GLB,, | Performed by: PSYCHIATRY & NEUROLOGY

## 2023-01-12 PROCEDURE — 99999 PR PBB SHADOW E&M-EST. PATIENT-LVL III: CPT | Mod: PBBFAC,,, | Performed by: PSYCHIATRY & NEUROLOGY

## 2023-01-12 PROCEDURE — 3008F BODY MASS INDEX DOCD: CPT | Mod: CPTII,S$GLB,, | Performed by: PSYCHIATRY & NEUROLOGY

## 2023-01-12 NOTE — PROGRESS NOTES
Neurology Clinic Follow-Up Note    Impression:  Episodic migraine without aura  Mild diabetic polyneuropathy  TMJ  HTN: controlled    Plan:  Reduce topiramate to 50mg daily x 30 days then D/C  Continue Imitrex 50mg (1/2 of 100mg) po prn  She will update me via [facility] if not improving  Continue [facility]raine [facility]  RTC 24 months or sooner, prn      Problem List Items Addressed This Visit          1 - High    Migraine without aura and without status migrainosus, not intractable - Primary       2     Diabetic polyneuropathy associated with type 2 diabetes mellitus       3     Essential hypertension       Patient returns for follow-up of above.  She is doing very well on topiramate without side effects.  No recent migraines.  Two mild L frontal pressure pain in last month.  She responds well to Imitrex 50mg prn.     She was diagnosed with TMJ and treatment is helping her headaches. Ear pain resolved.     Freq: 2/30 and 0/30    Abortives   Effective: ibuprofen/naproxen (variably), Imitrex 50mg (100mg effective but throat tightness)   Ineffective/not tolerated: Fioricet    Prophylactics    Effective: topiramate    No change in neuropathy symptoms      Running history:  HPI:  55 y/o AAF referred for evaluation and treatment of headaches.   She has a long-standing history (years) of episodic headache (diagnosed as migraine)    4 days ago, she had a severe headache with B neck pain and headache.  Similar symptoms twice in the past. Ibuprofen helped    Associated symptoms: nausea (occasional), +photo/-phonophobia, + unilateral throbbing  Duration: hours to all day  No aura  No interictal neck pain.  No sleep issues    Past Medical History:   Diagnosis Date    Diabetes mellitus, type 2     HTN (hypertension)     Hyperlipidemia     Migraine        Outpatient Medications Marked as Taking for the 1/12/23 encounter (Office Visit) with Jairo Burgess MD   Medication Sig Dispense Refill    blood sugar diagnostic (ONETOUCH  "VERIO TEST STRIPS) Strp TEST DAILY 100 strip 11    lancets (ONETOUCH DELICA PLUS LANCET) 33 gauge Misc TEST DAILY 100 each 11    levocetirizine (XYZAL) 5 MG tablet Take 5 mg by mouth every evening. TAKE 1 BY MOUTH EVERY MORNING.      lisinopriL (PRINIVIL,ZESTRIL) 20 MG tablet Take 1 tablet (20 mg total) by mouth once daily. 90 tablet 3    meloxicam (MOBIC) 15 MG tablet Take 15 mg by mouth continuous prn.      norethindrone ac-eth estradiol (AUROVELA 1/20, 21, ORAL) Take by mouth once daily.      sumatriptan (IMITREX) 100 MG tablet Take 1 tablet (100 mg total) by mouth every 2 (two) hours as needed for Migraine (Do not exceed 200mg in 24 hours). 27 tablet 3    topiramate (TOPAMAX) 50 MG tablet TAKE 1 TABLET(50 MG) BY MOUTH TWICE DAILY 60 tablet 5     /68   Pulse 77   Ht 5' 9" (1.753 m)   Wt 59.3 kg (130 lb 11.7 oz)   BMI 19.31 kg/m²   Well-developed, well nourished.  Awake, alert and oriented.  Language is normal.  EOMF without nystagmus, VFF, facial movements intact.  No UE drift.  No extinction to double simultaneous tactile stimulation.  Glove-stocking decrease temp and 1+ DTRs except trace KJs. Muscle power is normal.  Gait is steady.    30 mins chart review, face to face, documentation    Jairo Burgess MD        "

## 2023-01-30 ENCOUNTER — LAB VISIT (OUTPATIENT)
Dept: LAB | Facility: OTHER | Age: 58
End: 2023-01-30
Attending: INTERNAL MEDICINE
Payer: COMMERCIAL

## 2023-01-30 DIAGNOSIS — R19.4 CHANGE IN BOWEL HABIT: Primary | ICD-10-CM

## 2023-01-30 DIAGNOSIS — R19.7 DIARRHEA: ICD-10-CM

## 2023-01-30 LAB
ALBUMIN SERPL BCP-MCNC: 4.2 G/DL (ref 3.5–5.2)
ALP SERPL-CCNC: 53 U/L (ref 55–135)
ALT SERPL W/O P-5'-P-CCNC: 20 U/L (ref 10–44)
ANION GAP SERPL CALC-SCNC: 7 MMOL/L (ref 8–16)
AST SERPL-CCNC: 19 U/L (ref 10–40)
BASOPHILS # BLD AUTO: 0.02 K/UL (ref 0–0.2)
BASOPHILS NFR BLD: 0.6 % (ref 0–1.9)
BILIRUB SERPL-MCNC: 1 MG/DL (ref 0.1–1)
BUN SERPL-MCNC: 13 MG/DL (ref 6–20)
CALCIUM SERPL-MCNC: 10 MG/DL (ref 8.7–10.5)
CHLORIDE SERPL-SCNC: 109 MMOL/L (ref 95–110)
CO2 SERPL-SCNC: 25 MMOL/L (ref 23–29)
CREAT SERPL-MCNC: 0.7 MG/DL (ref 0.5–1.4)
CRP SERPL-MCNC: 0.4 MG/L (ref 0–8.2)
DIFFERENTIAL METHOD: ABNORMAL
EOSINOPHIL # BLD AUTO: 0.1 K/UL (ref 0–0.5)
EOSINOPHIL NFR BLD: 1.6 % (ref 0–8)
ERYTHROCYTE [DISTWIDTH] IN BLOOD BY AUTOMATED COUNT: 13 % (ref 11.5–14.5)
EST. GFR  (NO RACE VARIABLE): >60 ML/MIN/1.73 M^2
GLUCOSE SERPL-MCNC: 107 MG/DL (ref 70–110)
HCT VFR BLD AUTO: 38.3 % (ref 37–48.5)
HGB BLD-MCNC: 12.3 G/DL (ref 12–16)
IMM GRANULOCYTES # BLD AUTO: 0.02 K/UL (ref 0–0.04)
IMM GRANULOCYTES NFR BLD AUTO: 0.6 % (ref 0–0.5)
LYMPHOCYTES # BLD AUTO: 1.8 K/UL (ref 1–4.8)
LYMPHOCYTES NFR BLD: 54.9 % (ref 18–48)
MCH RBC QN AUTO: 32.1 PG (ref 27–31)
MCHC RBC AUTO-ENTMCNC: 32.1 G/DL (ref 32–36)
MCV RBC AUTO: 100 FL (ref 82–98)
MONOCYTES # BLD AUTO: 0.3 K/UL (ref 0.3–1)
MONOCYTES NFR BLD: 9.4 % (ref 4–15)
NEUTROPHILS # BLD AUTO: 1.1 K/UL (ref 1.8–7.7)
NEUTROPHILS NFR BLD: 32.9 % (ref 38–73)
NRBC BLD-RTO: 0 /100 WBC
PLATELET # BLD AUTO: 174 K/UL (ref 150–450)
PMV BLD AUTO: 12.1 FL (ref 9.2–12.9)
POTASSIUM SERPL-SCNC: 4.3 MMOL/L (ref 3.5–5.1)
PROT SERPL-MCNC: 7.8 G/DL (ref 6–8.4)
RBC # BLD AUTO: 3.83 M/UL (ref 4–5.4)
SODIUM SERPL-SCNC: 141 MMOL/L (ref 136–145)
WBC # BLD AUTO: 3.19 K/UL (ref 3.9–12.7)

## 2023-01-30 PROCEDURE — 86140 C-REACTIVE PROTEIN: CPT | Performed by: INTERNAL MEDICINE

## 2023-01-30 PROCEDURE — 80053 COMPREHEN METABOLIC PANEL: CPT | Performed by: INTERNAL MEDICINE

## 2023-01-30 PROCEDURE — 85025 COMPLETE CBC W/AUTO DIFF WBC: CPT | Performed by: INTERNAL MEDICINE

## 2023-01-30 PROCEDURE — 36415 COLL VENOUS BLD VENIPUNCTURE: CPT | Performed by: INTERNAL MEDICINE

## 2023-02-01 ENCOUNTER — TELEPHONE (OUTPATIENT)
Dept: INTERNAL MEDICINE | Facility: CLINIC | Age: 58
End: 2023-02-01
Payer: COMMERCIAL

## 2023-02-01 NOTE — TELEPHONE ENCOUNTER
----- Message from Keith Freeman MD sent at 2/1/2023  9:57 AM CST -----  Her metabolic panel was normal. Her blood count is stable. Please schedule a follow up with me in June for a physical.

## 2023-02-17 ENCOUNTER — TELEPHONE (OUTPATIENT)
Dept: INTERNAL MEDICINE | Facility: CLINIC | Age: 58
End: 2023-02-17
Payer: COMMERCIAL

## 2023-02-17 NOTE — TELEPHONE ENCOUNTER
----- Message from Mónica Kumari sent at 2/17/2023  9:30 AM CST -----  Contact: Dr Tariq's Office [phone]  Requesting lab results from 01/30/223 to be faxed to [phone]    Thank you

## 2023-04-03 ENCOUNTER — PATIENT MESSAGE (OUTPATIENT)
Dept: ADMINISTRATIVE | Facility: HOSPITAL | Age: 58
End: 2023-04-03
Payer: COMMERCIAL

## 2023-04-05 DIAGNOSIS — E11.9 TYPE 2 DIABETES MELLITUS WITHOUT COMPLICATION: ICD-10-CM

## 2023-04-10 ENCOUNTER — PATIENT MESSAGE (OUTPATIENT)
Dept: ADMINISTRATIVE | Facility: HOSPITAL | Age: 58
End: 2023-04-10
Payer: COMMERCIAL

## 2023-04-17 ENCOUNTER — TELEPHONE (OUTPATIENT)
Dept: ENDOCRINOLOGY | Facility: CLINIC | Age: 58
End: 2023-04-17
Payer: COMMERCIAL

## 2023-04-21 ENCOUNTER — TELEPHONE (OUTPATIENT)
Dept: HEMATOLOGY/ONCOLOGY | Facility: CLINIC | Age: 58
End: 2023-04-21
Payer: COMMERCIAL

## 2023-04-21 NOTE — TELEPHONE ENCOUNTER
----- Message from Roxy Thomson sent at 4/21/2023  3:48 PM CDT -----  Regarding: RE: referral from [facility]  Eric Sabine,  Yes, she will need the referral order.    Thanks,  Roxy  ----- Message -----  From: Sabine Connor RN  Sent: 4/21/2023   3:28 PM CDT  To: Roxy Thomson, Sarai Solo, PhD  Subject: referral from [facility] Dr. Solo,     I received a fax from the  NET clinic today and just called them to clarify the reason for the referral. Patient is doing great from a NET perspective but is interested in meeting with a psychologist. Since they don't have an equivalent option at  the NP was referring her to you specifically.     She didn't send an order but said she'd be happy to if needed? Just let me know. Including Roxy as well in case we can proceed with scheduling. All records are in care everywhere    Thanks!

## 2023-04-24 ENCOUNTER — TELEPHONE (OUTPATIENT)
Dept: INFUSION THERAPY | Facility: HOSPITAL | Age: 58
End: 2023-04-24
Payer: COMMERCIAL

## 2023-04-24 NOTE — TELEPHONE ENCOUNTER
Called [facility] back and left message for MARLEN Wang regarding referral to psych for patient. In message explained a referral order is needed and that she can fax it to me and I'll send it to the right dept. Provided my direct number in message as well

## 2023-04-25 DIAGNOSIS — E11.9 TYPE 2 DIABETES MELLITUS WITHOUT COMPLICATION, WITHOUT LONG-TERM CURRENT USE OF INSULIN: ICD-10-CM

## 2023-04-25 RX ORDER — BLOOD-GLUCOSE METER
EACH MISCELLANEOUS
Qty: 100 STRIP | Refills: 0 | OUTPATIENT
Start: 2023-04-25

## 2023-04-25 NOTE — TELEPHONE ENCOUNTER
Signed 1 week ago (4/17/2023):   ONETOUCH VERIO TEST STRIPS Strp   Sig: TEST ONCE DAILY   Disp:  100 strip    Refills:  0   Signed by: Keith De León MD

## 2023-05-04 ENCOUNTER — TELEPHONE (OUTPATIENT)
Dept: INTERNAL MEDICINE | Facility: CLINIC | Age: 58
End: 2023-05-04
Payer: COMMERCIAL

## 2023-05-04 ENCOUNTER — PATIENT MESSAGE (OUTPATIENT)
Dept: INTERNAL MEDICINE | Facility: CLINIC | Age: 58
End: 2023-05-04
Payer: COMMERCIAL

## 2023-05-04 ENCOUNTER — TELEPHONE (OUTPATIENT)
Dept: HEMATOLOGY/ONCOLOGY | Facility: CLINIC | Age: 58
End: 2023-05-04
Payer: COMMERCIAL

## 2023-05-04 ENCOUNTER — PATIENT MESSAGE (OUTPATIENT)
Dept: HEMATOLOGY/ONCOLOGY | Facility: CLINIC | Age: 58
End: 2023-05-04
Payer: COMMERCIAL

## 2023-05-04 ENCOUNTER — OFFICE VISIT (OUTPATIENT)
Dept: PSYCHIATRY | Facility: CLINIC | Age: 58
End: 2023-05-04
Payer: COMMERCIAL

## 2023-05-04 DIAGNOSIS — C7A.010 MALIGNANT CARCINOID TUMOR OF DUODENUM: Primary | ICD-10-CM

## 2023-05-04 DIAGNOSIS — C7A.010 MALIGNANT CARCINOID TUMOR OF DUODENUM: ICD-10-CM

## 2023-05-04 DIAGNOSIS — F43.23 ADJUSTMENT DISORDER WITH MIXED ANXIETY AND DEPRESSED MOOD: Primary | ICD-10-CM

## 2023-05-04 PROCEDURE — 4010F PR ACE/ARB THEARPY RXD/TAKEN: ICD-10-PCS | Mod: CPTII,S$GLB,, | Performed by: PSYCHOLOGIST

## 2023-05-04 PROCEDURE — 1159F MED LIST DOCD IN RCRD: CPT | Mod: CPTII,S$GLB,, | Performed by: PSYCHOLOGIST

## 2023-05-04 PROCEDURE — 99999 PR PBB SHADOW E&M-EST. PATIENT-LVL II: ICD-10-PCS | Mod: PBBFAC,,, | Performed by: PSYCHOLOGIST

## 2023-05-04 PROCEDURE — 99999 PR PBB SHADOW E&M-EST. PATIENT-LVL II: CPT | Mod: PBBFAC,,, | Performed by: PSYCHOLOGIST

## 2023-05-04 PROCEDURE — 1159F PR MEDICATION LIST DOCUMENTED IN MEDICAL RECORD: ICD-10-PCS | Mod: CPTII,S$GLB,, | Performed by: PSYCHOLOGIST

## 2023-05-04 PROCEDURE — 90791 PSYCH DIAGNOSTIC EVALUATION: CPT | Mod: S$GLB,,, | Performed by: PSYCHOLOGIST

## 2023-05-04 PROCEDURE — 4010F ACE/ARB THERAPY RXD/TAKEN: CPT | Mod: CPTII,S$GLB,, | Performed by: PSYCHOLOGIST

## 2023-05-04 PROCEDURE — 90791 PR PSYCHIATRIC DIAGNOSTIC EVALUATION: ICD-10-PCS | Mod: S$GLB,,, | Performed by: PSYCHOLOGIST
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
## 2023-05-04 NOTE — TELEPHONE ENCOUNTER
----- Message from Sushma Sanchez sent at 5/4/2023  1:20 PM CDT -----  Contact: [phone]  Pt wants Lucy to call her back.

## 2023-05-05 ENCOUNTER — HOSPITAL ENCOUNTER (EMERGENCY)
Facility: HOSPITAL | Age: 58
Discharge: HOME | End: 2023-05-05
Attending: EMERGENCY MEDICINE
Payer: COMMERCIAL

## 2023-05-05 VITALS
WEIGHT: 133 LBS | HEART RATE: 85 BPM | DIASTOLIC BLOOD PRESSURE: 84 MMHG | OXYGEN SATURATION: 98 % | HEIGHT: 69 IN | RESPIRATION RATE: 18 BRPM | TEMPERATURE: 99 F | BODY MASS INDEX: 19.7 KG/M2 | SYSTOLIC BLOOD PRESSURE: 151 MMHG

## 2023-05-05 DIAGNOSIS — F41.9 ANXIETY: Primary | ICD-10-CM

## 2023-05-05 PROCEDURE — 99283 EMERGENCY DEPT VISIT LOW MDM: CPT

## 2023-05-05 PROCEDURE — 99284 PR EMERGENCY DEPT VISIT,LEVEL IV: ICD-10-PCS | Mod: GC,,, | Performed by: EMERGENCY MEDICINE

## 2023-05-05 PROCEDURE — 99284 EMERGENCY DEPT VISIT MOD MDM: CPT | Mod: GC,,, | Performed by: EMERGENCY MEDICINE

## 2023-05-05 RX ORDER — HYDROXYZINE HYDROCHLORIDE 25 MG/1
25 TABLET, FILM COATED ORAL DAILY PRN
Qty: 5 TABLET | Refills: 0 | Status: SHIPPED | OUTPATIENT
Start: 2023-05-05 | End: 2023-05-10

## 2023-05-05 NOTE — LETTER
[facility] - General Surgery  [address]  [place] [address]  Phone: [phone] January 23, 2020      Halima Laughlin MD  [address] [place] [address]    Patient: Teresa Trujillo   MR Number: [id number]   YOB: 1965   Date of Visit: 1/23/2020     Dear Dr. Laughlin:    Thank you for referring Teresa Trujillo to me for evaluation. Attached you will find relevant portions of my assessment and plan of care.    Ms. Trujillo is doing well after diaphragmatic hernia repair.  She is to continue light duty restrictions until February 17th and follow up as needed.    If you have questions, please do not hesitate to call me. I look forward to following Teresa Trujillo along with you.    Sincerely,      Alexander Trujillo MD  Professor, [employer]  Section Head, General Surgery  [facility]    WSR/afw    CC  Juan Miguel Quinn Jr., MD        99.6

## 2023-05-05 NOTE — ED PROVIDER NOTES
Encounter Date: 2023       History     Chief Complaint   Patient presents with    Psychiatric Evaluation     Depression, anxiety, denies suicidal/homicidal ideation, told need to get on medicine, I feel like I don't exist, don't want to live any more     Patient is a 50-year-old female previous history of hypertension, hyperlipidemia, diabetes with previous history of depression and anxiety in 2016 on Effexor has been off of it but reports that she came to the emergency department for resources and prescription for medication.  Patient reports that she is all her psychologist yesterday recommended patient be placed back on medication.  Patient states that she does not feel like herself and has some anxiety but denies any suicidal or homicidal ideation.  She has no thoughts of hurting herself or hurting anyone else and never had any of these thoughts in the past.  Patient states that she attempted to reach out to her primary care physician in order to have the medication prescribed as she had reached out to her previous provider who was in [place] who gave her records of her previous dosage but primary care is unable to see her until May 29th and she felt that if she came into the emergency department she may be able to get a prescription or into resources more quickly.      After reviewing the psychologist note from yesterday they had low concern for suicidal ideation in the felt that patient was behaving appropriately and was no concern for possible pec at the time.    The history is provided by the patient and medical records.   Review of patient's allergies indicates:   Allergen Reactions    Atorvastatin      Past Medical History:   Diagnosis Date    Diabetes mellitus, type 2     HTN (hypertension)     Hyperlipidemia     Migraine      Past Surgical History:   Procedure Laterality Date     SECTION      CHOLECYSTECTOMY      DIAGNOSTIC LAPAROSCOPY Right 2020    Procedure: LAPAROSCOPY, DIAGNOSTIC w/  possible right diaphgramatic hernia repair;  Surgeon: Alexander Trujillo MD;  [address];  Service: General;  Laterality: Right;  tramatic diaphgramtic    LAPAROSCOPIC CHOLECYSTECTOMY N/A 11/7/2019    Procedure: CHOLECYSTECTOMY, LAPAROSCOPIC;  Surgeon: Juan Miguel Quinn Jr., MD;  Location: [facility];  Service: General;  Laterality: N/A;    LIVER BIOPSY       Family History   Problem Relation Age of Onset    Thyroid disease Mother     Heart disease Sister     Cancer Brother     Diabetes Maternal Grandmother      Social History     Tobacco Use    Smoking status: Never    Smokeless tobacco: Never   Substance Use Topics    Alcohol use: Yes     Comment: casually    Drug use: No     Review of Systems   Psychiatric/Behavioral:  Positive for sleep disturbance. Negative for agitation, behavioral problems, confusion, dysphoric mood, hallucinations, self-injury and suicidal ideas. The patient is nervous/anxious.      Physical Exam     Initial Vitals [05/05/23 1814]   BP Pulse Resp Temp SpO2   (!) 151/84 85 18 98.8 °F (37.1 °C) 98 %      MAP       --         Physical Exam    Nursing note and vitals reviewed.  Constitutional: She appears well-developed and well-nourished.   HENT:   Head: Normocephalic and atraumatic.   Eyes: EOM are normal. Pupils are equal, round, and reactive to light.   Neck: Neck supple.   Normal range of motion.  Cardiovascular:  Normal rate and regular rhythm.           Pulmonary/Chest: Breath sounds normal.   Abdominal: Abdomen is soft.   Musculoskeletal:      Cervical back: Normal range of motion and neck supple.     Neurological: She is alert and oriented to person, place, and time.   Skin: Skin is warm and dry.   Psychiatric: She has a normal mood and affect. Her speech is normal and behavior is normal. Judgment and thought content normal. Cognition and memory are normal. She expresses no homicidal and no suicidal ideation. She expresses no suicidal plans and no homicidal plans.       ED  Course   Procedures  Labs Reviewed   HIV 1 / 2 ANTIBODY   HEPATITIS C ANTIBODY          Imaging Results    None          Medications - No data to display  Medical Decision Making:   Initial Assessment:   Patient is a 58-year-old female presenting to the emergency department for resources as she is having difficulty reaching her PCP to restart her antidepressants that she was on approximately 9 years ago.  At the time assessment patient is alert oriented in no acute distress.  Patient is afebrile vitals within normal limits.  Differential Diagnosis:   Anxiety  Depression    ED Management:  Upon assessment patient is calm cooperative with no suicidal or homicidal ideation.  Patient reports that she has intermittent episodes of anxiety and has been following up with a psychologist and recently went to therapy.  There they recommended outpatient follow-up and restarting her medication.  Patient is taking appropriate measures to obtain care attempting to reach out to her primary care physician her previous physician and trying to establish care with psychiatry.  Patient is at this time not gravely disable a danger to herself or anyone else.  Discussed plan with patient and will give Atarax p.r.n. for episodes of anxiety.  After discussing with case management about possibilities of providing care to patient prior to previously scheduled May 29th 8, A referral for outpatient home ambulatory referral placed so that a nurse practitioner can visit and potentially start her on medication.  A referral to psychiatry was also placed.  At this time patient is stable for discharge.  Patient discharged return precautions discussed and understood.                        Clinical Impression:   Final diagnoses:  [F41.9] Anxiety (Primary)        ED Disposition Condition    Discharge Stable          ED Prescriptions       Medication Sig Dispense Start Date End Date Auth. Provider    hydrOXYzine HCL (ATARAX) 25 MG tablet Take 1 tablet  (25 mg total) by mouth daily as needed for Anxiety. 5 tablet 5/5/2023 5/10/2023 Dariel Tariq MD          Follow-up Information       Follow up With Specialties Details Why Contact Info    Keith Freeman MD Internal Medicine Go in 1 week  [address]  [place] [address]  [phone]      [facility] - Emergency Dept Emergency Medicine Go to  If symptoms worsen [address] [facility] [id number] [phone]             Dariel Tariq MD  Resident  05/05/23 1922

## 2023-05-05 NOTE — TELEPHONE ENCOUNTER
Per pt Psychologist is recommending that she be put on medication and she recall being on Effexor in 2016, may need a referral to Psychiatry.    Please advise,  Thank You.

## 2023-05-06 NOTE — DISCHARGE INSTRUCTIONS
Follow-up with primary care physician.  For anxiety use Atarax 25 mg tablet as needed as prescribed.  A at home care referral was placed which should establish and allow for a nurse practitioner to come to your home and evaluate you.  A referral for psychiatry was also placed they should reach out to you and set up an appointment.    Return to emergency department if you develop severe anxiety, thoughts of hurting herself or anyone else difficulty breathing or any other new or concerning symptoms.

## 2023-05-07 DIAGNOSIS — F32.A DEPRESSION, UNSPECIFIED DEPRESSION TYPE: Primary | ICD-10-CM

## 2023-05-08 ENCOUNTER — TELEPHONE (OUTPATIENT)
Dept: HEMATOLOGY/ONCOLOGY | Facility: CLINIC | Age: 58
End: 2023-05-08
Payer: COMMERCIAL

## 2023-05-08 NOTE — TELEPHONE ENCOUNTER
Pt would like to know if you can refill the Hydroxyzine HCL 25 mg medication until she can get in to see Psychiatry.    Please advise,  Thank You.

## 2023-05-09 ENCOUNTER — PES CALL (OUTPATIENT)
Dept: ADMINISTRATIVE | Facility: CLINIC | Age: 58
End: 2023-05-09
Payer: COMMERCIAL

## 2023-05-10 DIAGNOSIS — E11.9 TYPE 2 DIABETES MELLITUS WITHOUT COMPLICATION, UNSPECIFIED WHETHER LONG TERM INSULIN USE: ICD-10-CM

## 2023-05-15 ENCOUNTER — PATIENT MESSAGE (OUTPATIENT)
Dept: ADMINISTRATIVE | Facility: HOSPITAL | Age: 58
End: 2023-05-15
Payer: COMMERCIAL

## 2023-05-15 ENCOUNTER — OFFICE VISIT (OUTPATIENT)
Dept: PSYCHIATRY | Facility: CLINIC | Age: 58
End: 2023-05-15
Payer: COMMERCIAL

## 2023-05-15 VITALS
SYSTOLIC BLOOD PRESSURE: 140 MMHG | HEART RATE: 74 BPM | DIASTOLIC BLOOD PRESSURE: 69 MMHG | BODY MASS INDEX: 20.46 KG/M2 | HEIGHT: 69 IN | WEIGHT: 138.13 LBS

## 2023-05-15 DIAGNOSIS — F43.21 ADJUSTMENT DISORDER WITH DEPRESSED MOOD: ICD-10-CM

## 2023-05-15 DIAGNOSIS — F41.1 GENERALIZED ANXIETY DISORDER: ICD-10-CM

## 2023-05-15 DIAGNOSIS — F33.0 MAJOR DEPRESSIVE DISORDER, RECURRENT, MILD: Primary | ICD-10-CM

## 2023-05-15 PROCEDURE — 4010F PR ACE/ARB THEARPY RXD/TAKEN: ICD-10-PCS | Mod: CPTII,S$GLB,, | Performed by: NURSE PRACTITIONER

## 2023-05-15 PROCEDURE — 99999 PR PBB SHADOW E&M-EST. PATIENT-LVL IV: ICD-10-PCS | Mod: PBBFAC,,, | Performed by: NURSE PRACTITIONER

## 2023-05-15 PROCEDURE — 3008F BODY MASS INDEX DOCD: CPT | Mod: CPTII,S$GLB,, | Performed by: NURSE PRACTITIONER

## 2023-05-15 PROCEDURE — 99215 PR OFFICE/OUTPT VISIT, EST, LEVL V, 40-54 MIN: ICD-10-PCS | Mod: S$GLB,,, | Performed by: NURSE PRACTITIONER

## 2023-05-15 PROCEDURE — 4010F ACE/ARB THERAPY RXD/TAKEN: CPT | Mod: CPTII,S$GLB,, | Performed by: NURSE PRACTITIONER

## 2023-05-15 PROCEDURE — 3078F DIAST BP <80 MM HG: CPT | Mod: CPTII,S$GLB,, | Performed by: NURSE PRACTITIONER

## 2023-05-15 PROCEDURE — 1159F PR MEDICATION LIST DOCUMENTED IN MEDICAL RECORD: ICD-10-PCS | Mod: CPTII,S$GLB,, | Performed by: NURSE PRACTITIONER

## 2023-05-15 PROCEDURE — 1160F PR REVIEW ALL MEDS BY PRESCRIBER/CLIN PHARMACIST DOCUMENTED: ICD-10-PCS | Mod: CPTII,S$GLB,, | Performed by: NURSE PRACTITIONER

## 2023-05-15 PROCEDURE — 3008F PR BODY MASS INDEX (BMI) DOCUMENTED: ICD-10-PCS | Mod: CPTII,S$GLB,, | Performed by: NURSE PRACTITIONER

## 2023-05-15 PROCEDURE — 99999 PR PBB SHADOW E&M-EST. PATIENT-LVL IV: CPT | Mod: PBBFAC,,, | Performed by: NURSE PRACTITIONER

## 2023-05-15 PROCEDURE — 3077F SYST BP >= 140 MM HG: CPT | Mod: CPTII,S$GLB,, | Performed by: NURSE PRACTITIONER

## 2023-05-15 PROCEDURE — 1159F MED LIST DOCD IN RCRD: CPT | Mod: CPTII,S$GLB,, | Performed by: NURSE PRACTITIONER

## 2023-05-15 PROCEDURE — 99215 OFFICE O/P EST HI 40 MIN: CPT | Mod: S$GLB,,, | Performed by: NURSE PRACTITIONER

## 2023-05-15 PROCEDURE — 1160F RVW MEDS BY RX/DR IN RCRD: CPT | Mod: CPTII,S$GLB,, | Performed by: NURSE PRACTITIONER

## 2023-05-15 PROCEDURE — 3078F PR MOST RECENT DIASTOLIC BLOOD PRESSURE < 80 MM HG: ICD-10-PCS | Mod: CPTII,S$GLB,, | Performed by: NURSE PRACTITIONER

## 2023-05-15 PROCEDURE — 3077F PR MOST RECENT SYSTOLIC BLOOD PRESSURE >= 140 MM HG: ICD-10-PCS | Mod: CPTII,S$GLB,, | Performed by: NURSE PRACTITIONER

## 2023-05-15 RX ORDER — VENLAFAXINE HYDROCHLORIDE 75 MG/1
75 CAPSULE, EXTENDED RELEASE ORAL DAILY
Qty: 30 CAPSULE | Refills: 11 | Status: SHIPPED | OUTPATIENT
Start: 2023-05-15 | End: 2023-10-24

## 2023-05-15 RX ORDER — VENLAFAXINE HYDROCHLORIDE 37.5 MG/1
37.5 CAPSULE, EXTENDED RELEASE ORAL DAILY
Qty: 7 CAPSULE | Refills: 0 | Status: SHIPPED | OUTPATIENT
Start: 2023-05-15 | End: 2023-05-29

## 2023-05-15 NOTE — PROGRESS NOTES
Outpatient Psychiatry Initial Visit (MD/NP)    5/15/2023    Teresa Trujillo, a 58 y.o. female, presenting for initial evaluation visit. Met with patient.    Reason for Encounter: self-referral. Patient complains of depression.    History of Present Illness:     Pt is a 58 year old female who presents for psychiatric evaluation.     Grieving over mother who passed in 2005 before Alyson.      I was diagnosed with TMJ and a benign tumor.      Dealing with marital issues with  who impregnated another woman.  They went to couples therapy and found out he was giving gifts to the child despite telling her that he had no contact with them.     Depression sx:  sadness, isolating, no trouble with sleep and appetite, denies suicidal thoughts    Anxiety sx:  excessive worrying, past hx of panic attacks    Affect appears blunted with dysthymic mood. Thought processes are linear, clear, and organized. Denies SI/HI/AVH.     Psychiatric Medications: currently taking    Past trials include: Effexor in 2016 (took medication for 2 years with good response), failed on Lexapro    Psychosocial History:  with 2 kids 36 and 31 years old. Unemployed.  Denies use of tobacco, alcohol, or elicit drugs. Denies known family hx of mental illness.    Medical History: history of hypertension, hyperlipidemia, diabetes with previous history of depression and anxiety     Review Of Systems:     GENERAL:  No weight gain or loss  SKIN:  No rashes or lacerations  HEAD:  No headaches  EYES:  No exophthalmos, jaundice or blindness  EARS:  No dizziness, tinnitus or hearing loss  NOSE:  No changes in smell  MOUTH & THROAT:  No dyskinetic movements or obvious goiter  CHEST:  No shortness of breath, hyperventilation or cough  CARDIOVASCULAR:  No tachycardia or chest pain  ABDOMEN:  No nausea, vomiting, pain, constipation or diarrhea  URINARY:  No frequency, dysuria or sexual dysfunction  ENDOCRINE:  No polydipsia, polyuria  MUSCULOSKELETAL:   "No pain or stiffness of the joints  NEUROLOGIC:  No weakness, sensory changes, seizures, confusion, memory loss, tremor or other abnormal movements    Current Evaluation:     Nutritional Screening: Considering the patient's height and weight, medications, medical history and preferences, should a referral be made to the dietitian? no    Constitutional  Vitals:  Most recent vital signs, dated greater than 90 days prior to this appointment, were reviewed.    Vitals:    05/15/23 0809   BP: (!) 140/69   Pulse: 74   Weight: 62.6 kg (138 lb 1.9 oz)   Height: 5' 9" (1.753 m)        General:  unremarkable, age appropriate     Musculoskeletal  Muscle Strength/Tone:  not examined   Gait & Station:  non-ataxic     Psychiatric  Speech:  no latency; no press   Mood & Affect:  steady  congruent and appropriate   Thought Process:  normal and logical   Associations:  intact   Thought Content:  normal, no suicidality, no homicidality, delusions, or paranoia   Insight:  intact   Judgement: behavior is adequate to circumstances   Orientation:  grossly intact   Memory: intact for content of interview   Language: grossly intact   Attention Span & Concentration:  able to focus   Fund of Knowledge:  intact and appropriate to age and level of education       Relevant Elements of Neurological Exam: normal gait    Functioning in Relationships:  Spouse/partner: see above HPI  Peers: see above HPI  Employers: see above HPI    Laboratory Data  No visits with results within 1 Month(s) from this visit.   Latest known visit with results is:   Lab Visit on 01/30/2023   Component Date Value Ref Range Status    Sodium 01/30/2023 141  136 - 145 mmol/L Final    Potassium 01/30/2023 4.3  3.5 - 5.1 mmol/L Final    Chloride 01/30/2023 109  95 - 110 mmol/L Final    CO2 01/30/2023 25  23 - 29 mmol/L Final    Glucose 01/30/2023 107  70 - 110 mg/dL Final    BUN 01/30/2023 13  6 - 20 mg/dL Final    Creatinine 01/30/2023 0.7  0.5 - 1.4 mg/dL Final    Calcium " 2023 10.0  8.7 - 10.5 mg/dL Final    Total Protein 2023 7.8  6.0 - 8.4 g/dL Final    Albumin 2023 4.2  3.5 - 5.2 g/dL Final    Total Bilirubin 2023 1.0  0.1 - 1.0 mg/dL Final    Alkaline Phosphatase 2023 53 (L)  55 - 135 U/L Final    AST 2023 19  10 - 40 U/L Final    ALT 2023 20  10 - 44 U/L Final    Anion Gap 2023 7 (L)  8 - 16 mmol/L Final    eGFR 2023 >60  >60 mL/min/1.73 m^2 Final    WBC 2023 3.19 (L)  3.90 - 12.70 K/uL Final    RBC 2023 3.83 (L)  4.00 - 5.40 M/uL Final    Hemoglobin 2023 12.3  12.0 - 16.0 g/dL Final    Hematocrit 2023 38.3  37.0 - 48.5 % Final    MCV 2023 100 (H)  82 - 98 fL Final    MCH 2023 32.1 (H)  27.0 - 31.0 pg Final    MCHC 2023 32.1  32.0 - 36.0 g/dL Final    RDW 2023 13.0  11.5 - 14.5 % Final    Platelets 2023 174  150 - 450 K/uL Final    MPV 2023 12.1  9.2 - 12.9 fL Final    Immature Granulocytes 2023 0.6 (H)  0.0 - 0.5 % Final    Gran # (ANC) 2023 1.1 (L)  1.8 - 7.7 K/uL Final    Immature Grans (Abs) 2023 0.02  0.00 - 0.04 K/uL Final    Lymph # 2023 1.8  1.0 - 4.8 K/uL Final    Mono # 2023 0.3  0.3 - 1.0 K/uL Final    Eos # 2023 0.1  0.0 - 0.5 K/uL Final    Baso # 2023 0.02  0.00 - 0.20 K/uL Final    nRBC 2023 0  0 /100 WBC Final    Gran % 2023 32.9 (L)  38.0 - 73.0 % Final    Lymph % 2023 54.9 (H)  18.0 - 48.0 % Final    Mono % 2023 9.4  4.0 - 15.0 % Final    Eosinophil % 2023 1.6  0.0 - 8.0 % Final    Basophil % 2023 0.6  0.0 - 1.9 % Final    Differential Method 2023 Automated   Final    CRP 2023 0.4  0.0 - 8.2 mg/L Final         Medications  Outpatient Encounter Medications as of 5/15/2023   Medication Sig Dispense Refill    [] hydrOXYzine HCL (ATARAX) 25 MG tablet Take 1 tablet (25 mg total) by mouth daily as needed for Anxiety. 5 tablet 0    lancets (ONETOUCH DELICA PLUS  LANCET) 33 gauge Misc TEST DAILY 100 each 11    levocetirizine (XYZAL) 5 MG tablet Take 5 mg by mouth every evening. TAKE 1 BY MOUTH EVERY MORNING.      lisinopriL (PRINIVIL,ZESTRIL) 20 MG tablet Take 1 tablet (20 mg total) by mouth once daily. 90 tablet 3    meloxicam (MOBIC) 15 MG tablet Take 15 mg by mouth continuous prn.      norethindrone ac-eth estradiol (AUROVELA 1/20, 21, ORAL) Take by mouth once daily.      ONETOUCH VERIO TEST STRIPS Strp TEST ONCE DAILY 100 strip 0    sumatriptan (IMITREX) 100 MG tablet Take 1 tablet (100 mg total) by mouth every 2 (two) hours as needed for Migraine (Do not exceed 200mg in 24 hours). 27 tablet 3    topiramate (TOPAMAX) 50 MG tablet TAKE 1 TABLET(50 MG) BY MOUTH TWICE DAILY 60 tablet 5    venlafaxine (EFFEXOR-XR) 37.5 MG 24 hr capsule Take 1 capsule (37.5 mg total) by mouth once daily. for 7 days 7 capsule 0    venlafaxine (EFFEXOR-XR) 75 MG 24 hr capsule Take 1 capsule (75 mg total) by mouth once daily. Start Week 2 30 capsule 11     Facility-Administered Encounter Medications as of 5/15/2023   Medication Dose Route Frequency Provider Last Rate Last Admin    0.9%  NaCl infusion   Intravenous Continuous Juan Miguel Quinn Jr., MD        lidocaine (PF) 10 mg/ml (1%) injection 10 mg  1 mL Intradermal Once Juan Miguel Quinn Jr., MD               Assessment - Diagnosis - Goals:     Impression:       ICD-10-CM ICD-9-CM   1. Major depressive disorder, recurrent, mild  F33.0 296.31   2. Adjustment disorder with depressed mood  F43.21 309.0   3. Generalized anxiety disorder  F41.1 300.02       Strengths and Liabilities: Strength: Patient accepts guidance/feedback, Strength: Patient is expressive/articulate., Strength: Patient is intelligent.    Treatment Goals:  Specify outcomes written in observable, behavioral terms:   Anxiety: reducing negative automatic thoughts, reducing physical symptoms of anxiety, and reducing time spent worrying (<30 minutes/day)  Depression: increasing self-reward  for positive behaviors (one/day), increasing self-reward for positive thoughts (one/day), increasing social contacts (three/week), and reducing negative automatic thoughts    Treatment Plan/Recommendations:   Medication Management: The risks and benefits of medication were discussed with the patient.  The treatment plan and follow up plan were reviewed with the patient.  Reviewed available medical records and labs  Start Effexor XR 37.5 mg daily x 1 week then increase to 75 mg daily  Follow-up in 3 months  Counseling this visit focused on building adaptive coping skills, medication teaching, and cognitive behavioral therapy (CBT)    Return to Clinic: 3 months    Counseling time: 34 minutes  Total time: 60 minutes

## 2023-05-18 ENCOUNTER — OFFICE VISIT (OUTPATIENT)
Dept: PSYCHIATRY | Facility: CLINIC | Age: 58
End: 2023-05-18
Payer: COMMERCIAL

## 2023-05-18 DIAGNOSIS — C7A.010 MALIGNANT CARCINOID TUMOR OF DUODENUM: ICD-10-CM

## 2023-05-18 DIAGNOSIS — F41.1 GENERALIZED ANXIETY DISORDER: ICD-10-CM

## 2023-05-18 DIAGNOSIS — F33.0 MAJOR DEPRESSIVE DISORDER, RECURRENT, MILD: Primary | ICD-10-CM

## 2023-05-18 PROCEDURE — 4010F PR ACE/ARB THEARPY RXD/TAKEN: ICD-10-PCS | Mod: CPTII,95,, | Performed by: PSYCHOLOGIST

## 2023-05-18 PROCEDURE — 1159F MED LIST DOCD IN RCRD: CPT | Mod: CPTII,95,, | Performed by: PSYCHOLOGIST

## 2023-05-18 PROCEDURE — 90834 PR PSYCHOTHERAPY W/PATIENT, 45 MIN: ICD-10-PCS | Mod: 95,,, | Performed by: PSYCHOLOGIST

## 2023-05-18 PROCEDURE — 90834 PSYTX W PT 45 MINUTES: CPT | Mod: 95,,, | Performed by: PSYCHOLOGIST

## 2023-05-18 PROCEDURE — 4010F ACE/ARB THERAPY RXD/TAKEN: CPT | Mod: CPTII,95,, | Performed by: PSYCHOLOGIST

## 2023-05-18 PROCEDURE — 1159F PR MEDICATION LIST DOCUMENTED IN MEDICAL RECORD: ICD-10-PCS | Mod: CPTII,95,, | Performed by: PSYCHOLOGIST

## 2023-05-18 NOTE — PROGRESS NOTES
INFORMED CONSENT: Patient was identified using two patient-identifiers. The patient has been informed of the risks and benefits associated with engaging in psychotherapy, the handling of protected health information, the rights of privacy and the limits of confidentiality. The patient has also been informed of the importance of reporting any suicidal or homicidal ideation to this or any provider to ensure safety of all parties, and the Teresa Trujillo expressed understanding. The patient was agreeable to these terms and freely participates in individual psychotherapy.    PSYCHO-ONCOLOGY NOTE/ Individual Psychotherapy     Date: 5/18/2023   [facility]        Therapeutic Intervention: Met with patient.  Outpatient - Insight oriented psychotherapy 45 min - CPT code 43119      Patient was last seen by me on 5/4/2023    Problem list  Patient Active Problem List   Diagnosis    Type 2 diabetes mellitus without complication, without long-term current use of insulin    Essential hypertension    Migraine without aura and without status migrainosus, not intractable    Diabetic polyneuropathy associated with type 2 diabetes mellitus    Mixed hyperlipidemia    Malignant carcinoid tumor of duodenum    Adjustment disorder with depressed mood    Major depressive disorder, recurrent, mild    Generalized anxiety disorder       Chief complaint/reason for encounter: interpersonal   Met with patient to evaluate psychosocial adaptation to diagnosis/treatment/survivorship of NET    Current Medications  Current Outpatient Medications   Medication    lancets (ONETOUCH DELICA PLUS LANCET) 33 gauge Misc    levocetirizine (XYZAL) 5 MG tablet    lisinopriL (PRINIVIL,ZESTRIL) 20 MG tablet    meloxicam (MOBIC) 15 MG tablet    norethindrone ac-eth estradiol (AUROVELA 1/20, 21, ORAL)    ONETOUCH VERIO TEST STRIPS Strp    sumatriptan (IMITREX) 100 MG tablet    topiramate (TOPAMAX) 50 MG tablet    venlafaxine (EFFEXOR-XR) 37.5 MG 24 hr  capsule    venlafaxine (EFFEXOR-XR) 75 MG 24 hr capsule     No current facility-administered medications for this visit.     Facility-Administered Medications Ordered in Other Visits   Medication Frequency    0.9%  NaCl infusion Continuous    lidocaine (PF) 10 mg/ml (1%) injection 10 mg Once       Objective:  Teresa Trujillo arrived promptly for the session.  The patient was fully cooperative throughout the session.  Appearance: age appropriate, appropriately  dressed, adequately  groomed  Behavior/Cooperation: friendly and cooperative  Speech: normal in rate, volume, and tone and appropriate quality, quantity and organization of sentences  Mood: sad  Affect: tearful  Thought Process: goal-directed, logical  Thought Content: normal,  No delusions or paranoia; did not appear to be responding to internal stimuli during the session  Orientation: grossly intact  Attention Span/Concentration: Attends to session without distraction; reports no difficulty  Insight: patient has awareness of illness; good insight into own behavior and behavior of others  Judgment: the patient's behavior is adequate to circumstances    Interval history and content of current session: Patient started Effexor. Visited ED for distress. Spoke candidly with  about feelings and limits. Discussed assertive communication.  Discussed diagnosis, treatment, prognosis, current adaptation to disease and treatment status, and family's adaptation to disease and treatment status. Reports to be coping with great difficulty. Evaluated cognitive response, paying particular attention to negative intrusive thoughts of a persistent and detrimental nature. Thoughts of this type are in evidence with mild distress. Provided cognitive behavioral therapy to address negative cognitions. Identified and evaluated psychosocial and environmental stressors secondary to diagnosis and treatment.  Examined proactive behaviors that may be implemented to minimize or  ameliorate psychosocial stressors secondary to diagnosis and treatment.     Risk parameters:   Patient reports no suicidal ideation  Patient reports no homicidal ideation  Patient reports no self-injurious behavior  Patient reports no violent behavior   Safety needs:  None at this time      Verbal deficits: None     Patient's response to intervention:The patient's response to intervention is accepting.     Progress toward goals and other mental status changes:  The patient's progress toward goals is good.      Progress to date:Progress as Expected      Goals from last visit: Met     Patient reported outcomes:    Distress Thermometer:   Distress Score    Distress Score: 6        Practical Problems Physical Problems                                                   Family Problems                                         Emotional Problems                                                         Spiritual/Religions Concerns     Spiritual / Restoration Concerns: Yes         Other Problems                 PHQ ANSWERS    Q1. Little interest or pleasure in doing things: (P) More than half the days (05/18/23 1004)  Q2. Feeling down, depressed, or hopeless: (P) More than half the days (05/18/23 1004)  Q3. Trouble falling or staying asleep, or sleeping too much: (P) Not at all (05/18/23 1004)  Q4. Feeling tired or having little energy: (P) Not at all (05/18/23 1004)  Q5. Poor appetite or overeating: (P) Not at all (05/18/23 1004)  Q6. Feeling bad about yourself - or that you are a failure or have let yourself or your family down: (P) Not at all (05/18/23 1004)  Q7. Trouble concentrating on things, such as reading the newspaper or watching television: (P) Not at all (05/18/23 1004)  Q8. Moving or speaking so slowly that other people could have noticed. Or the opposite - being so fidgety or restless that you have been moving around a lot more than usual: (P) Not at all (05/18/23 1004)  Q9.  0    PHQ8 Score : (P) 4 (05/18/23  1004)  PHQ-9 Total Score: (P) 4 (05/18/23 1004)     FRANCISCO-7 Answers    GAD7 5/18/2023   1. Feeling nervous, anxious, or on edge? 0   2. Not being able to stop or control worrying? 0   3. Worrying too much about different things? 2   4. Trouble relaxing? 0   5. Being so restless that it is hard to sit still? 0   6. Becoming easily annoyed or irritable? 0   7. Feeling afraid as if something awful might happen? 2   FRANCISCO-7 Score 4     FRANCISCO-7 Score: (P) 4  Interpretation: (P) Normal     Client Strengths: verbal, intelligent, successful, good social support, good insight, commitment to wellness, strong jennifer, strong cultural traditions     Diagnosis:     ICD-10-CM ICD-9-CM   1. Major depressive disorder, recurrent, mild  F33.0 296.31   2. Generalized anxiety disorder  F41.1 300.02   3. Malignant carcinoid tumor of duodenum  C7A.010 209.01       Treatment Plan:individual psychotherapy and medication management by physician  Target symptoms: depression, anxiety   Why chosen therapy is appropriate versus another modality: relevant to diagnosis, patient responds to this modality, evidence based practice  Outcome monitoring methods: self-report, observation, checklist/rating scale  Therapeutic intervention type: insight oriented psychotherapy, behavior modifying psychotherapy  Prognosis: Good      Behavioral goals:    Exercise:   Stress management:   Social engagement:   Nutrition:   Smoking Cessation:   Therapy:  Learn and utilize emotion management strategies  Improve interpersonal effectiveness and communication skills  Identify, implement and maintain healthy personal boundaries    Return to clinic: as scheduled    Next Session:      Length of Service (minutes direct face-to-face contact): 45    Sarai Solo, PhD  Clinical Psychologist  [place] License #[id number]  [place] License #[id number]

## 2023-05-24 ENCOUNTER — CLINICAL SUPPORT (OUTPATIENT)
Dept: REHABILITATION | Facility: HOSPITAL | Age: 58
End: 2023-05-24
Payer: COMMERCIAL

## 2023-05-24 DIAGNOSIS — R53.81 PHYSICAL DECONDITIONING: ICD-10-CM

## 2023-05-24 DIAGNOSIS — F43.29 STRESS AND ADJUSTMENT REACTION: Primary | ICD-10-CM

## 2023-05-24 DIAGNOSIS — C7A.010 MALIGNANT CARCINOID TUMOR OF DUODENUM: ICD-10-CM

## 2023-05-24 PROCEDURE — 97165 OT EVAL LOW COMPLEX 30 MIN: CPT

## 2023-05-24 NOTE — PLAN OF CARE
[facility]   Occupational Therapy Initial Evaluation - Therapeutic Yoga    Date: 2023   Name: Teresa Trujillo  Clinic Number: [id number]    Therapy Diagnosis:   Encounter Diagnoses   Name Primary?    Malignant carcinoid tumor of duodenum     Stress and adjustment reaction Yes    Physical deconditioning      Physician: Abril Chavez PA-C    Physician Orders: Eval and Treat   Medical Diagnosis from Referral: Malignant carcinoid tumor of duodenum [C7A.010]  Evaluation Date: 2023  Authorization Period Expiration: 23  Plan of Care Expiration: 23  Progress Note Due: 23  Visit # / Visits authorized:      Precautions: Standard and cancer     Time In: 2 pm  Time Out: 3 pm  Total Appointment Time (timed & untimed codes): 60 minutes      SUBJECTIVE     Date of onset: diagnosed in 2022.  She is under obsevation for a duodanel tumor.     History of current condition: eTresa reports: She became  depressed, anxious and became very stressed due to her tumor as well as diagnosis of TMJ.     Falls: none    Prior Therapy: Previous PT for her knee  Social History:  lives with their spouse  Occupation: retired   in PT office    Prior Level of Function: worked ou 3-5x per week, caring for her grandson 5x week, maintaining her house    Current Level of Function: not able to work out-afraid she will hurt herself, caring for her grandson about 1x week. Pt reports that she overdoes on her good days and when depressed/anxious about her diagnosis, she is not able to motivate herself to do anything.    Self-Care: difficulty managing her housework due to depression/motivation    Pain: 0/10          Patients goals:          Medical History:   Past Medical History:   Diagnosis Date    Diabetes mellitus, type 2     HTN (hypertension)     Hyperlipidemia     Migraine        Surgical History:   Teresa Trujillo  has a past surgical history that includes   section; Liver biopsy; Laparoscopic cholecystectomy (N/A, 11/7/2019); Cholecystectomy; and Diagnostic laparoscopy (Right, 1/6/2020).    Medications:   Teresa has a current medication list which includes the following prescription(s): lancets, levocetirizine, lisinopril, meloxicam, norethindrone ac-eth estradiol, onetouch verio test strips, sumatriptan, topiramate, and venlafaxine, and the following Facility-Administered Medications: sodium chloride 0.9% and lidocaine (pf) 10 mg/ml (1%).    Allergies:   Review of patient's allergies indicates:   Allergen Reactions    Atorvastatin           OBJECTIVE       Emotional Stress Response: sleep more, isolate, do not talk  Physical Stress Response: headache  Behavioral Stress Response: sleep more, isolate, do not talk      Patient Specific Functional Scale:         Activity 5/24/23       anxiety 2       2.exercising 3       3. Pacing myself 3       4.        5.        6.        SCORE       2.6         Total Score = Sum of activity scores / number of activities  Minimum Detectable Change (90% CII) for average score = 2 points  Minimum Detectable Change (90% CI) for single activity score = 3 points    Lifestyle Questionnaire:      Lifestyle Response   Emotional Response to Health Status poor   Patient's Communication Skills good   Reported Eating Habits fair   Reported Sleeping Patterns good   Reported Energy Level fair   Knowledge of Exercises & Fitness good   Frequency of Exercise Sessions/Week <3     PATIENT EDUCATION AND HOME EXERCISES     Education provided:   - - physiology of yoga/meditation and immune health     Written Home Exercises Provided: yes. Exercises were reviewed and Teresa was able to demonstrate them prior to the end of the session.  Teresa demonstrated good  understanding of the education provided. See EMR under Patient Instructions for exercises provided during therapy sessions.    ASSESSMENT     Teresa is a 58 y.o. female referred to outpatient  Occupational Therapy with a medical diagnosis of Malignant carcinoid tumor of duodenum [C7A.010]. Patient presents with the following impairments:      Self-Care Limitations, Deconditioning, Activity Pacing, and Poor Stress Coping Skills    Following medical record review, it is determined that Teresa will benefit from skilled outpatient Occupational Therapy to address the impairments stated above and in the chart below in order to maximize functional activities. The following goals were discussed with the patient and patient is in agreement with them as addressed in the treatment plan. The patient's rehab potential is Good. Plan of care discussed with patient: Yes  Patient's spiritual, cultural and educational needs considered and patient is agreeable to the plan of care and goals as stated below:     Anticipated Barriers for therapy: none    Medical Necessity is demonstrated by the following    Profile and History Assessment of Occupational Performance Level of Clinical Decision Making Complexity Score   Occupational Profile:   Teresa Trujillo is a 58 y.o. female who lives with their spouse and is retired. Teresa has difficulty with  ADLs and IADLs as listed previously, which  Affecting herdaily functional abilities.      Comorbidities:    has a past medical history of Diabetes mellitus, type 2, HTN (hypertension), Hyperlipidemia, and Migraine.    Medical and Therapy History Review:   Brief               Performance Deficits    Physical:  Joint Mobility  Joint Stability  Muscle Power/Strength    Cognitive:  No Deficits    Psychosocial:    No Deficits     Clinical Decision Making:  low    Assessment Process:  Problem-Focused Assessments    Modification/Need for Assistance:  Not Necessary    Intervention Selection:  Multiple Treatment Options       low  Based on PMHX, co morbidities , data from assessments and functional level of assistance required with task and clinical presentation directly impacting  function.         Goals:  Short Term Goals: 6 weeks      Goal # Goal Status   1 Patient will demonstrate independence with diaphragmatic breathing in sit and supine. Progressing   2 Pt. will identify resource for audio body scan to assist with stress management/immune health    3 Patient will identify 2 new stress coping skills for stress management/immune health. Progressing   4 Patient will identify activity pacing problems.  Patient will then implement new plan for daily activity to increase endurance for ADL's. Progressing      Long Term Goals: 12 weeks      Goal # Goal Status   1 Patient will demonstrate independence with yoga Home Exercise Program to increase strength and endurance for ADL's. Progressing   2 Patient will demonstrate independence with relaxation techniques to manage stress for immune health. Progressing   3 Patient will verbalize good understanding of stress/immune health relationship.  Progressing   4               PLAN   Plan of care Certification: 5/24/2023 to 9/24/23.    Outpatient Occupational Therapy 1 times weekly for 1 weeks to include the following interventions: Neuromuscular Re-ed, Patient Education, Self Care, Therapeutic Activities, and Therapeutic Exercise.     Hilton Palacios OT      I

## 2023-05-29 ENCOUNTER — LAB VISIT (OUTPATIENT)
Dept: LAB | Facility: HOSPITAL | Age: 58
End: 2023-05-29
Attending: INTERNAL MEDICINE
Payer: COMMERCIAL

## 2023-05-29 ENCOUNTER — OFFICE VISIT (OUTPATIENT)
Dept: INTERNAL MEDICINE | Facility: CLINIC | Age: 58
End: 2023-05-29
Payer: COMMERCIAL

## 2023-05-29 VITALS
SYSTOLIC BLOOD PRESSURE: 120 MMHG | WEIGHT: 135.38 LBS | HEART RATE: 75 BPM | HEIGHT: 69 IN | BODY MASS INDEX: 20.05 KG/M2 | DIASTOLIC BLOOD PRESSURE: 78 MMHG | OXYGEN SATURATION: 98 %

## 2023-05-29 DIAGNOSIS — D3A.010 CARCINOID TUMOR OF DUODENUM, UNSPECIFIED WHETHER MALIGNANT: ICD-10-CM

## 2023-05-29 DIAGNOSIS — Z00.00 ANNUAL PHYSICAL EXAM: Primary | ICD-10-CM

## 2023-05-29 DIAGNOSIS — Z00.00 ANNUAL PHYSICAL EXAM: ICD-10-CM

## 2023-05-29 DIAGNOSIS — G43.009 MIGRAINE WITHOUT AURA AND WITHOUT STATUS MIGRAINOSUS, NOT INTRACTABLE: ICD-10-CM

## 2023-05-29 DIAGNOSIS — E11.9 TYPE 2 DIABETES MELLITUS WITHOUT COMPLICATION, WITHOUT LONG-TERM CURRENT USE OF INSULIN: ICD-10-CM

## 2023-05-29 DIAGNOSIS — I10 ESSENTIAL HYPERTENSION: ICD-10-CM

## 2023-05-29 LAB
BILIRUB UR QL STRIP: NEGATIVE
CHOLEST SERPL-MCNC: 236 MG/DL (ref 120–199)
CHOLEST/HDLC SERPL: 3.1 {RATIO} (ref 2–5)
CLARITY UR REFRACT.AUTO: ABNORMAL
COLOR UR AUTO: YELLOW
ESTIMATED AVG GLUCOSE: 123 MG/DL (ref 68–131)
GLUCOSE UR QL STRIP: NEGATIVE
HBA1C MFR BLD: 5.9 % (ref 4–5.6)
HDLC SERPL-MCNC: 77 MG/DL (ref 40–75)
HDLC SERPL: 32.6 % (ref 20–50)
HGB UR QL STRIP: NEGATIVE
KETONES UR QL STRIP: NEGATIVE
LDLC SERPL CALC-MCNC: 133.8 MG/DL (ref 63–159)
LEUKOCYTE ESTERASE UR QL STRIP: NEGATIVE
MICROSCOPIC COMMENT: NORMAL
NITRITE UR QL STRIP: NEGATIVE
NONHDLC SERPL-MCNC: 159 MG/DL
PH UR STRIP: 5 [PH] (ref 5–8)
PROT UR QL STRIP: NEGATIVE
RBC #/AREA URNS AUTO: 2 /HPF (ref 0–4)
SP GR UR STRIP: 1.02 (ref 1–1.03)
SQUAMOUS #/AREA URNS AUTO: 1 /HPF
TRIGL SERPL-MCNC: 126 MG/DL (ref 30–150)
URN SPEC COLLECT METH UR: ABNORMAL
WBC #/AREA URNS AUTO: 1 /HPF (ref 0–5)

## 2023-05-29 PROCEDURE — 99999 PR PBB SHADOW E&M-EST. PATIENT-LVL III: ICD-10-PCS | Mod: PBBFAC,,, | Performed by: INTERNAL MEDICINE

## 2023-05-29 PROCEDURE — 3078F PR MOST RECENT DIASTOLIC BLOOD PRESSURE < 80 MM HG: ICD-10-PCS | Mod: CPTII,S$GLB,, | Performed by: INTERNAL MEDICINE

## 2023-05-29 PROCEDURE — 3008F PR BODY MASS INDEX (BMI) DOCUMENTED: ICD-10-PCS | Mod: CPTII,S$GLB,, | Performed by: INTERNAL MEDICINE

## 2023-05-29 PROCEDURE — 1160F PR REVIEW ALL MEDS BY PRESCRIBER/CLIN PHARMACIST DOCUMENTED: ICD-10-PCS | Mod: CPTII,S$GLB,, | Performed by: INTERNAL MEDICINE

## 2023-05-29 PROCEDURE — 1159F PR MEDICATION LIST DOCUMENTED IN MEDICAL RECORD: ICD-10-PCS | Mod: CPTII,S$GLB,, | Performed by: INTERNAL MEDICINE

## 2023-05-29 PROCEDURE — 3044F PR MOST RECENT HEMOGLOBIN A1C LEVEL <7.0%: ICD-10-PCS | Mod: CPTII,S$GLB,, | Performed by: INTERNAL MEDICINE

## 2023-05-29 PROCEDURE — 1159F MED LIST DOCD IN RCRD: CPT | Mod: CPTII,S$GLB,, | Performed by: INTERNAL MEDICINE

## 2023-05-29 PROCEDURE — 99999 PR PBB SHADOW E&M-EST. PATIENT-LVL III: CPT | Mod: PBBFAC,,, | Performed by: INTERNAL MEDICINE

## 2023-05-29 PROCEDURE — 1160F RVW MEDS BY RX/DR IN RCRD: CPT | Mod: CPTII,S$GLB,, | Performed by: INTERNAL MEDICINE

## 2023-05-29 PROCEDURE — 3078F DIAST BP <80 MM HG: CPT | Mod: CPTII,S$GLB,, | Performed by: INTERNAL MEDICINE

## 2023-05-29 PROCEDURE — 4010F ACE/ARB THERAPY RXD/TAKEN: CPT | Mod: CPTII,S$GLB,, | Performed by: INTERNAL MEDICINE

## 2023-05-29 PROCEDURE — 99396 PR PREVENTIVE VISIT,EST,40-64: ICD-10-PCS | Mod: S$GLB,,, | Performed by: INTERNAL MEDICINE

## 2023-05-29 PROCEDURE — 81001 URINALYSIS AUTO W/SCOPE: CPT | Performed by: INTERNAL MEDICINE

## 2023-05-29 PROCEDURE — 3074F PR MOST RECENT SYSTOLIC BLOOD PRESSURE < 130 MM HG: ICD-10-PCS | Mod: CPTII,S$GLB,, | Performed by: INTERNAL MEDICINE

## 2023-05-29 PROCEDURE — 3008F BODY MASS INDEX DOCD: CPT | Mod: CPTII,S$GLB,, | Performed by: INTERNAL MEDICINE

## 2023-05-29 PROCEDURE — 4010F PR ACE/ARB THEARPY RXD/TAKEN: ICD-10-PCS | Mod: CPTII,S$GLB,, | Performed by: INTERNAL MEDICINE

## 2023-05-29 PROCEDURE — 80061 LIPID PANEL: CPT | Performed by: INTERNAL MEDICINE

## 2023-05-29 PROCEDURE — 83036 HEMOGLOBIN GLYCOSYLATED A1C: CPT | Performed by: INTERNAL MEDICINE

## 2023-05-29 PROCEDURE — 3044F HG A1C LEVEL LT 7.0%: CPT | Mod: CPTII,S$GLB,, | Performed by: INTERNAL MEDICINE

## 2023-05-29 PROCEDURE — 3074F SYST BP LT 130 MM HG: CPT | Mod: CPTII,S$GLB,, | Performed by: INTERNAL MEDICINE

## 2023-05-29 PROCEDURE — 36415 COLL VENOUS BLD VENIPUNCTURE: CPT | Performed by: INTERNAL MEDICINE

## 2023-05-29 PROCEDURE — 99396 PREV VISIT EST AGE 40-64: CPT | Mod: S$GLB,,, | Performed by: INTERNAL MEDICINE

## 2023-05-29 RX ORDER — LANCETS 33 GAUGE
EACH MISCELLANEOUS
Qty: 100 EACH | Refills: 11 | Status: SHIPPED | OUTPATIENT
Start: 2023-05-29

## 2023-05-29 RX ORDER — LISINOPRIL 20 MG/1
20 TABLET ORAL DAILY
Qty: 90 TABLET | Refills: 3 | Status: SHIPPED | OUTPATIENT
Start: 2023-05-29

## 2023-05-29 NOTE — PROGRESS NOTES
CC:  Annual exam     HPI:  The patient is a 58-year-old female with migraine headaches, non insulin-dependent diabetes, hypertension, hyperlipidemia, depression and carcinoid tumor of the duodenum who presents today for annual exam.  Since we last saw the patient, she was placed on medication for depression.  She finds this medication is helping.  The patient was found to have a carcinoid tumor of the duodenum.  This was resected endoscopically.  The patient was found to have a nonspecific urinary bladder filling defect found on CT of the abdomen and pelvis.  The patient was referred to [facility] evaluation.    ROS:  Patient reports her weight is up and down.  No visual changes.  No auditory changes.  She was diagnosed with TMJ in his being seen by an oral surgeon for this.  No chest pain.  No shortness of breath.  She started to do yoga.  No nausea vomiting.  No abdominal pain.  Does have some urinary urgency.  No weakness in arms or legs.  No skin changes.  No breast changes.  No numbness or tingling arms or legs.  The patient reports she will be due for a colonoscopy in 2025.  She will be due for a mammogram in August.  She will be due to see her OBGYN in July.    Physical exam:   General appearance:  No acute distress   HEENT:  Conjunctiva is clear.  Pupils equal.  TMs are clear.  Nasal septum is midline without discharge.  Oropharynx is without erythema.  Trachea is midline without JVD.    Pulmonary:  Good inspiratory, expiratory breath sounds are heard.  Lungs are clear auscultation.    Cardiovascular:  S1-S2, rhythm is regular.  2+ carotid pulse of bruits.  Extremities without edema.    GI: Abdomen is nontender, nondistended without hepatosplenomegaly  Comments:  The patient had recent blood test done at [facility]     Assessment:  1.  Annual exam  2.  Hypertension  3.  Migraine headaches  4. Carcinoid tumor of the duodenum  5.  Hyperlipidemia  6.  Depression   7.  Bladder  filling defect.    Plan:  1. Will schedule a A1c and lipid panel.    2. The patient has a referral in to [facility].

## 2023-06-02 ENCOUNTER — OFFICE VISIT (OUTPATIENT)
Dept: PSYCHIATRY | Facility: CLINIC | Age: 58
End: 2023-06-02
Payer: COMMERCIAL

## 2023-06-02 DIAGNOSIS — C7A.010 MALIGNANT CARCINOID TUMOR OF DUODENUM: ICD-10-CM

## 2023-06-02 DIAGNOSIS — F33.0 MAJOR DEPRESSIVE DISORDER, RECURRENT, MILD: Primary | ICD-10-CM

## 2023-06-02 DIAGNOSIS — F41.1 GENERALIZED ANXIETY DISORDER: ICD-10-CM

## 2023-06-02 PROCEDURE — 1159F MED LIST DOCD IN RCRD: CPT | Mod: CPTII,95,, | Performed by: PSYCHOLOGIST

## 2023-06-02 PROCEDURE — 3044F PR MOST RECENT HEMOGLOBIN A1C LEVEL <7.0%: ICD-10-PCS | Mod: CPTII,95,, | Performed by: PSYCHOLOGIST

## 2023-06-02 PROCEDURE — 4010F PR ACE/ARB THEARPY RXD/TAKEN: ICD-10-PCS | Mod: CPTII,95,, | Performed by: PSYCHOLOGIST

## 2023-06-02 PROCEDURE — 3044F HG A1C LEVEL LT 7.0%: CPT | Mod: CPTII,95,, | Performed by: PSYCHOLOGIST

## 2023-06-02 PROCEDURE — 90834 PSYTX W PT 45 MINUTES: CPT | Mod: 95,,, | Performed by: PSYCHOLOGIST

## 2023-06-02 PROCEDURE — 1159F PR MEDICATION LIST DOCUMENTED IN MEDICAL RECORD: ICD-10-PCS | Mod: CPTII,95,, | Performed by: PSYCHOLOGIST

## 2023-06-02 PROCEDURE — 4010F ACE/ARB THERAPY RXD/TAKEN: CPT | Mod: CPTII,95,, | Performed by: PSYCHOLOGIST

## 2023-06-02 PROCEDURE — 90834 PR PSYCHOTHERAPY W/PATIENT, 45 MIN: ICD-10-PCS | Mod: 95,,, | Performed by: PSYCHOLOGIST

## 2023-06-02 NOTE — PROGRESS NOTES
INFORMED CONSENT: Patient was identified using two patient-identifiers. The patient has been informed of the risks and benefits associated with engaging in psychotherapy, the handling of protected health information, the rights of privacy and the limits of confidentiality. The patient has also been informed of the importance of reporting any suicidal or homicidal ideation to this or any provider to ensure safety of all parties, and the Teresa Trujillo expressed understanding. The patient was agreeable to these terms and freely participates in individual psychotherapy.    Telemedicine PSYCHO-ONCOLOGY NOTE/ Individual Psychotherapy     Consultation started: 6/2/2023 at 10:04 AM   The chief complaint leading to consultation is: anx/dep  The patient location is: [place], Patient home  Virtual visit with synchronous audio and video   Patient alone at the time of consultation      Crisis Disclaimer: Patient was informed that due to the virtual nature of the visit, that if a crisis develops, protocols will be implemented to ensure patient safety, including but not limited to: 1) Initiating a welfare check with local Law Enforcement, 2) Calling 911/National Crisis Hotline, and/or 3) Initiating PEC/CEC procedures.      Each patient provided medical services by telemedicine is:  (1) informed of the relationship between the physician and patient and the respective role of any other health care provider with respect to management of the patient; and (2) notified that he or she may decline to receive medical services by telemedicine and may withdraw from such care at any time.    Date: 6/2/2023   [facility]         Therapeutic Intervention: Met with patient.  Outpatient - Insight oriented psychotherapy 45 min - CPT code 58289    Referring provider:    Chief complaint/reason for encounter: depression and anxiety   Met with patient to evaluate psychosocial adaptation to survivorship of BET    Patient was  last seen by me on 5/18/2023    Objective:      Teresa Trujillo arrived promptly for the session.The patient was fully cooperative throughout the session.  Appearance: age appropriate, appropriately  dressed, adequately  groomed  Behavior/Cooperation: friendly and cooperative  Speech: normal in rate, volume, and tone and appropriate quality, quantity and organization of sentences  Mood: anxious, sad  Affect: mood congruent  Thought Process: goal-directed, logical  Thought Content: normal,  No delusions or paranoia; did not appear to be responding to internal stimuli during the session  Orientation: grossly intact  Memory: Grossly intact  Attention Span/Concentration: Attends to session without distraction; reports no difficulty  Fund of Knowledge: average  Estimate of Intelligence: average from verbal skills and history  Cognition: grossly intact  Insight: patient has awareness of illness; good insight into own behavior and behavior of others  Judgment: the patient's behavior is adequate to circumstances      Interval history and content of current session: Patient with increase in unhelpful thoughts.  Discussed  importance of document thoughts to provide objective balance . Reports to be coping with great difficulty. Evaluated cognitive response, paying particular attention to negative intrusive thoughts of a persistent and detrimental nature. Thoughts of this type are in evidence with mild distress. Identified and evaluated psychosocial and environmental stressors secondary to diagnosis and treatment.     Focused on providing cognitive behavioral therapy to address negative cognitions. Examined proactive behaviors that may be implemented to minimize or ameliorate psychosocial stressors secondary to diagnosis and treatment.     Risk parameters:   Patient reports no suicidal ideation  Patient reports no homicidal ideation  Patient reports no self-injurious behavior  Patient reports no violent behavior   Safety  needs:  None at this time      Verbal deficits: None     Patient's response to intervention:The patient's response to intervention is accepting.     Progress toward goals and other mental status changes:  The patient's progress toward goals is good.      Progress to date:Progress - Ongoing, but Slow      Goals from last visit: Met      Patient reported outcomes:      Distress Thermometer:   Distress Score    Distress Score: (P) 0 - No Distress        Practical Problems Physical Problems                                                   Family Problems                                         Emotional Problems                                                         Spiritual/Religions Concerns     Spiritual / Caodaism Concerns: (P) Yes         Other Problems             PHQ ANSWERS    Q1. Little interest or pleasure in doing things: (P) Not at all (05/31/23 1114)  Q2. Feeling down, depressed, or hopeless: (P) Not at all (05/31/23 1114)  Q3. Trouble falling or staying asleep, or sleeping too much: (P) Not at all (05/31/23 1114)  Q4. Feeling tired or having little energy: (P) More than half the days (05/31/23 1114)  Q5. Poor appetite or overeating: (P) More than half the days (05/31/23 1114)  Q6. Feeling bad about yourself - or that you are a failure or have let yourself or your family down: (P) Not at all (05/31/23 1114)  Q7. Trouble concentrating on things, such as reading the newspaper or watching television: (P) Not at all (05/31/23 1114)  Q8. Moving or speaking so slowly that other people could have noticed. Or the opposite - being so fidgety or restless that you have been moving around a lot more than usual: (P) Not at all (05/31/23 1114)  Q9.  0    PHQ8 Score : (P) 4 (05/31/23 1114)  PHQ-9 Total Score: (P) 4 (05/31/23 1114)     FRANCISCO-7 Answers    GAD7 5/31/2023   1. Feeling nervous, anxious, or on edge? 0   2. Not being able to stop or control worrying? 0   3. Worrying too much about different things? 0   4.  Trouble relaxing? 0   5. Being so restless that it is hard to sit still? 0   6. Becoming easily annoyed or irritable? 2   7. Feeling afraid as if something awful might happen? 0   FRANCISCO-7 Score 2     FRANCISCO-7 Score: (P) 2  Interpretation: (P) Normal       Client Strengths: verbal, intelligent, successful, good social support, good insight, commitment to wellness, strong jennifer, strong cultural traditions      ICD-10-CM ICD-9-CM   1. Major depressive disorder, recurrent, mild  F33.0 296.31   2. Generalized anxiety disorder  F41.1 300.02   3. Malignant carcinoid tumor of duodenum  C7A.010 209.01        Treatment Plan:individual psychotherapy and medication management by physician  Target symptoms: depression, anxiety   Why chosen therapy is appropriate versus another modality: relevant to diagnosis, patient responds to this modality, evidence based practice  Outcome monitoring methods: self-report, observation, checklist/rating scale  Therapeutic intervention type: insight oriented psychotherapy, behavior modifying psychotherapy  Prognosis: Good      Behavioral goals:   Create personalized space at home  Monitor stressors in writing and bring to next visit    Return to clinic: as scheduled    Next: Review thought and categories     Length of Service (minutes direct face-to-face contact): 45          Sarai Solo, PhD  Clinical Psychologist  [place] License #[id number]  [place] License #[id number]

## 2023-06-19 ENCOUNTER — OFFICE VISIT (OUTPATIENT)
Dept: PSYCHIATRY | Facility: CLINIC | Age: 58
End: 2023-06-19
Payer: COMMERCIAL

## 2023-06-19 DIAGNOSIS — C7A.010 MALIGNANT CARCINOID TUMOR OF DUODENUM: ICD-10-CM

## 2023-06-19 DIAGNOSIS — F33.0 MAJOR DEPRESSIVE DISORDER, RECURRENT, MILD: Primary | ICD-10-CM

## 2023-06-19 DIAGNOSIS — F41.1 GENERALIZED ANXIETY DISORDER: ICD-10-CM

## 2023-06-19 PROCEDURE — 3044F HG A1C LEVEL LT 7.0%: CPT | Mod: CPTII,95,, | Performed by: PSYCHOLOGIST

## 2023-06-19 PROCEDURE — 4010F PR ACE/ARB THEARPY RXD/TAKEN: ICD-10-PCS | Mod: CPTII,95,, | Performed by: PSYCHOLOGIST

## 2023-06-19 PROCEDURE — 90832 PSYTX W PT 30 MINUTES: CPT | Mod: 95,,, | Performed by: PSYCHOLOGIST

## 2023-06-19 PROCEDURE — 4010F ACE/ARB THERAPY RXD/TAKEN: CPT | Mod: CPTII,95,, | Performed by: PSYCHOLOGIST

## 2023-06-19 PROCEDURE — 90832 PR PSYCHOTHERAPY W/PATIENT, 30 MIN: ICD-10-PCS | Mod: 95,,, | Performed by: PSYCHOLOGIST

## 2023-06-19 PROCEDURE — 1159F PR MEDICATION LIST DOCUMENTED IN MEDICAL RECORD: ICD-10-PCS | Mod: CPTII,95,, | Performed by: PSYCHOLOGIST

## 2023-06-19 PROCEDURE — 3044F PR MOST RECENT HEMOGLOBIN A1C LEVEL <7.0%: ICD-10-PCS | Mod: CPTII,95,, | Performed by: PSYCHOLOGIST

## 2023-06-19 PROCEDURE — 1159F MED LIST DOCD IN RCRD: CPT | Mod: CPTII,95,, | Performed by: PSYCHOLOGIST

## 2023-06-19 NOTE — PROGRESS NOTES
INFORMED CONSENT: Patient was identified using two patient-identifiers. The patient has been informed of the risks and benefits associated with engaging in psychotherapy, the handling of protected health information, the rights of privacy and the limits of confidentiality. The patient has also been informed of the importance of reporting any suicidal or homicidal ideation to this or any provider to ensure safety of all parties, and the Teresa Trujillo expressed understanding. The patient was agreeable to these terms and freely participates in individual psychotherapy.    Telemedicine PSYCHO-ONCOLOGY NOTE/ Individual Psychotherapy     Consultation started: 6/19/2023 at 2:04 PM   The chief complaint leading to consultation is: depression/anx  The patient location is: [place]  Patient home  Virtual visit with synchronous audio and video   Patient alone at the time of consultation      Crisis Disclaimer: Patient was informed that due to the virtual nature of the visit, that if a crisis develops, protocols will be implemented to ensure patient safety, including but not limited to: 1) Initiating a welfare check with local Law Enforcement, 2) Calling 911/National Crisis Hotline, and/or 3) Initiating PEC/CEC procedures.      Each patient provided medical services by telemedicine is:  (1) informed of the relationship between the physician and patient and the respective role of any other health care provider with respect to management of the patient; and (2) notified that he or she may decline to receive medical services by telemedicine and may withdraw from such care at any time.    Date: 6/19/2023   [facility]         Therapeutic Intervention: Met with patient.  Outpatient - Supportive psychotherapy 30 min - CPT Code 63383    Referring provider:    Chief complaint/reason for encounter: depression and anxiety   Met with patient to evaluate psychosocial adaptation to survivorship of NET    Patient  was last seen by me on 6/2/2023    Objective:      Teresa Trujillo arrived promptly for the session.The patient was fully cooperative throughout the session.  Appearance: age appropriate, appropriately  dressed, adequately  groomed  Behavior/Cooperation: friendly and cooperative  Speech: normal in rate, volume, and tone and appropriate quality, quantity and organization of sentences  Mood: euthymic  Affect: mood congruent  Thought Process: goal-directed, logical  Thought Content: normal,  No delusions or paranoia; did not appear to be responding to internal stimuli during the session  Orientation: grossly intact  Memory: Grossly intact  Attention Span/Concentration: Attends to session without distraction; reports no difficulty  Fund of Knowledge: average  Estimate of Intelligence: average from verbal skills and history  Cognition: grossly intact  Insight: patient has awareness of illness; good insight into own behavior and behavior of others  Judgment: the patient's behavior is adequate to circumstances      Interval history and content of current session: Patient engaging is BA and yoga weekly.  Discussed diagnosis, treatment, prognosis, current adaptation to disease and treatment status, and family's adaptation to disease and treatment status. Reports to be coping in a passive manner. Evaluated cognitive response, paying particular attention to negative intrusive thoughts of a persistent and detrimental nature. Thoughts of this type are in evidence with no distress. Identified and evaluated psychosocial and environmental stressors secondary to diagnosis and treatment.     Focused on providing cognitive behavioral therapy to address negative cognitions. Examined proactive behaviors that may be implemented to minimize or ameliorate psychosocial stressors secondary to diagnosis and treatment.     Risk parameters:   Patient reports no suicidal ideation  Patient reports no homicidal ideation  Patient reports no  self-injurious behavior  Patient reports no violent behavior   Safety needs:  None at this time      Verbal deficits: None     Patient's response to intervention:The patient's response to intervention is accepting.     Progress toward goals and other mental status changes:  The patient's progress toward goals is good.      Progress to date:Progress as Expected      Goals from last visit: Attempted, partially met      Patient reported outcomes:      Distress Thermometer:   Distress Score    Distress Score: 0 - No Distress        Practical Problems Physical Problems                                                   Family Problems                                         Emotional Problems                                                         Spiritual/Religions Concerns     Spiritual / Caodaism Concerns: Yes         Other Problems             PHQ ANSWERS    Q1. Little interest or pleasure in doing things: (P) Not at all (06/19/23 1129)  Q2. Feeling down, depressed, or hopeless: (P) More than half the days (06/19/23 1129)  Q3. Trouble falling or staying asleep, or sleeping too much: (P) Not at all (06/19/23 1129)  Q4. Feeling tired or having little energy: (P) Not at all (06/19/23 1129)  Q5. Poor appetite or overeating: (P) Several days (06/19/23 1129)  Q6. Feeling bad about yourself - or that you are a failure or have let yourself or your family down: (P) More than half the days (06/19/23 1129)  Q7. Trouble concentrating on things, such as reading the newspaper or watching television: (P) Not at all (06/19/23 1129)  Q8. Moving or speaking so slowly that other people could have noticed. Or the opposite - being so fidgety or restless that you have been moving around a lot more than usual: (P) Not at all (06/19/23 1129)  Q9.  0    PHQ8 Score : (P) 5 (06/19/23 1129)  PHQ-9 Total Score: (P) 5 (06/19/23 1129)     FRANCISCO-7 Answers    GAD7 6/19/2023   1. Feeling nervous, anxious, or on edge? 0   2. Not being able to stop or  control worrying? 2   3. Worrying too much about different things? 2   4. Trouble relaxing? 0   5. Being so restless that it is hard to sit still? 0   6. Becoming easily annoyed or irritable? 0   7. Feeling afraid as if something awful might happen? 0   FRANCISCO-7 Score 4     FRANCISCO-7 Score: (P) 4  Interpretation: (P) Normal       Client Strengths: verbal, intelligent, successful, good social support, good insight, commitment to wellness, strong jennifer, strong cultural traditions      ICD-10-CM ICD-9-CM   1. Major depressive disorder, recurrent, mild  F33.0 296.31   2. Generalized anxiety disorder  F41.1 300.02   3. Malignant carcinoid tumor of duodenum  C7A.010 209.01        Treatment Plan:individual psychotherapy and medication management by physician  Target symptoms: depression, anxiety   Why chosen therapy is appropriate versus another modality: relevant to diagnosis, patient responds to this modality, evidence based practice  Outcome monitoring methods: self-report, observation, checklist/rating scale  Therapeutic intervention type: insight oriented psychotherapy, behavior modifying psychotherapy  Prognosis: Good      Behavioral goals:    Exercise:   Stress management:   Social engagement:   Nutrition:   Smoking Cessation:   Therapy:  Increase daily self-care and attention to health management  Pleasant events scheduling and increased social interaction  Monitor stressors in writing and bring to next visit    Return to clinic: 1 month     Length of Service (minutes direct face-to-face contact): 30          Sarai Solo, PhD  Clinical Psychologist  [place] License #[id number]  [place] License #[id number]

## 2023-06-28 ENCOUNTER — PATIENT MESSAGE (OUTPATIENT)
Dept: ADMINISTRATIVE | Facility: HOSPITAL | Age: 58
End: 2023-06-28
Payer: COMMERCIAL

## 2023-07-20 ENCOUNTER — OFFICE VISIT (OUTPATIENT)
Dept: PSYCHIATRY | Facility: CLINIC | Age: 58
End: 2023-07-20
Payer: COMMERCIAL

## 2023-07-20 DIAGNOSIS — F33.0 MAJOR DEPRESSIVE DISORDER, RECURRENT, MILD: Primary | ICD-10-CM

## 2023-07-20 DIAGNOSIS — C7A.010 MALIGNANT CARCINOID TUMOR OF DUODENUM: ICD-10-CM

## 2023-07-20 DIAGNOSIS — F41.1 GENERALIZED ANXIETY DISORDER: ICD-10-CM

## 2023-07-20 PROCEDURE — 90837 PSYTX W PT 60 MINUTES: CPT | Mod: S$GLB,,, | Performed by: PSYCHOLOGIST

## 2023-07-20 PROCEDURE — 90837 PR PSYCHOTHERAPY W/PATIENT, 60 MIN: ICD-10-PCS | Mod: S$GLB,,, | Performed by: PSYCHOLOGIST

## 2023-07-20 PROCEDURE — 4010F PR ACE/ARB THEARPY RXD/TAKEN: ICD-10-PCS | Mod: CPTII,S$GLB,, | Performed by: PSYCHOLOGIST

## 2023-07-20 PROCEDURE — 4010F ACE/ARB THERAPY RXD/TAKEN: CPT | Mod: CPTII,S$GLB,, | Performed by: PSYCHOLOGIST

## 2023-07-20 PROCEDURE — 3044F PR MOST RECENT HEMOGLOBIN A1C LEVEL <7.0%: ICD-10-PCS | Mod: CPTII,S$GLB,, | Performed by: PSYCHOLOGIST

## 2023-07-20 PROCEDURE — 3044F HG A1C LEVEL LT 7.0%: CPT | Mod: CPTII,S$GLB,, | Performed by: PSYCHOLOGIST

## 2023-07-20 NOTE — PROGRESS NOTES
INFORMED CONSENT: Patient was identified using two patient-identifiers. The patient has been informed of the risks and benefits associated with engaging in psychotherapy, the handling of protected health information, the rights of privacy and the limits of confidentiality. The patient has also been informed of the importance of reporting any suicidal or homicidal ideation to this or any provider to ensure safety of all parties, and the Teresa Trujillo expressed understanding. The patient was agreeable to these terms and freely participates in individual psychotherapy.    PSYCHO-ONCOLOGY NOTE/ Individual Psychotherapy     Date: 7/20/2023   [facility]        Therapeutic Intervention: Met with patient.  Outpatient - Insight oriented psychotherapy 60 min - CPT code 86399      Patient was last seen by me on 6/19/2023    Problem list  Patient Active Problem List   Diagnosis    Type 2 diabetes mellitus without complication, without long-term current use of insulin    Essential hypertension    Migraine without aura and without status migrainosus, not intractable    Diabetic polyneuropathy associated with type 2 diabetes mellitus    Mixed hyperlipidemia    Malignant carcinoid tumor of duodenum    Adjustment disorder with depressed mood    Major depressive disorder, recurrent, mild    Generalized anxiety disorder    Stress and adjustment reaction    Physical deconditioning       Chief complaint/reason for encounter: depression and anxiety   Met with patient to evaluate psychosocial adaptation to diagnosis/treatment/survivorship of NET    Current Medications  Current Outpatient Medications   Medication    blood sugar diagnostic (ONETOUCH VERIO TEST STRIPS) Strp    lancets (ONETOUCH DELICA PLUS LANCET) 33 gauge Misc    levocetirizine (XYZAL) 5 MG tablet    lisinopriL (PRINIVIL,ZESTRIL) 20 MG tablet    norethindrone ac-eth estradiol (AUROVELA 1/20, 21, ORAL)    sumatriptan (IMITREX) 100 MG tablet    venlafaxine  (EFFEXOR-XR) 75 MG 24 hr capsule     No current facility-administered medications for this visit.     Facility-Administered Medications Ordered in Other Visits   Medication Frequency    0.9%  NaCl infusion Continuous    lidocaine (PF) 10 mg/ml (1%) injection 10 mg Once       Objective:  Teresa Trujillo arrived promptly for the session.    The patient was fully cooperative throughout the session.  Appearance: age appropriate, appropriately  dressed, adequately  groomed  Behavior/Cooperation: friendly and cooperative  Speech: normal in rate, volume, and tone and appropriate quality, quantity and organization of sentences  Mood: euthymic  Affect: mood congruent  Thought Process: goal-directed, logical  Thought Content: normal,  No delusions or paranoia; did not appear to be responding to internal stimuli during the session  Orientation: grossly intact  Attention Span/Concentration: Attends to session without distraction; reports no difficulty  Insight: patient has awareness of illness; good insight into own behavior and behavior of others  Judgment: the patient's behavior is adequate to circumstances    Interval history and content of current session:  Discussed diagnosis, treatment, prognosis, current adaptation to disease and treatment status, and family's adaptation to disease and treatment status. Reports to be coping in an adequate manner. Evaluated cognitive response, paying particular attention to negative intrusive thoughts of a persistent and detrimental nature. Thoughts of this type are in evidence with mild distress. Provided cognitive behavioral therapy to address negative cognitions. Identified and evaluated psychosocial and environmental stressors secondary to diagnosis and treatment.  Examined proactive behaviors that may be implemented to minimize or ameliorate psychosocial stressors secondary to diagnosis and treatment.     Risk parameters:   Patient reports no suicidal ideation  Patient reports no  homicidal ideation  Patient reports no self-injurious behavior  Patient reports no violent behavior   Safety needs:  None at this time      Verbal deficits: None     Patient's response to intervention:The patient's response to intervention is accepting.     Progress toward goals and other mental status changes:  The patient's progress toward goals is good.      Progress to date:Progress as Expected      Goals from last visit: Met     Patient reported outcomes:    Distress Thermometer:   Distress Score    Distress Score: 2        Practical Problems Physical Problems                                                   Family Problems                                         Emotional Problems                                                         Spiritual/Religions Concerns     Spiritual / Anabaptist Concerns: Yes         Other Problems                PHQ-9=    FRANCISCO-7=    PHQ ANSWERS    Q1. Little interest or pleasure in doing things: (P) Not at all (07/21/23 1607)  Q2. Feeling down, depressed, or hopeless: (P) Several days (07/21/23 1607)  Q3. Trouble falling or staying asleep, or sleeping too much: (P) Several days (07/21/23 1607)  Q4. Feeling tired or having little energy: (P) Not at all (07/21/23 1607)  Q5. Poor appetite or overeating: (P) Several days (07/21/23 1607)  Q6. Feeling bad about yourself - or that you are a failure or have let yourself or your family down: (P) Not at all (07/21/23 1607)  Q7. Trouble concentrating on things, such as reading the newspaper or watching television: (P) Not at all (07/21/23 1607)  Q8. Moving or speaking so slowly that other people could have noticed. Or the opposite - being so fidgety or restless that you have been moving around a lot more than usual: (P) Not at all (07/21/23 1607)  Q9.      PHQ8 Score : (P) 3 (07/21/23 1607)  PHQ-9 Total Score: (P) 3 (07/21/23 1607)     FRANCISCO-7 Answers    GAD7 7/21/2023   1. Feeling nervous, anxious, or on edge? 0   2. Not being able to stop or  control worrying? 0   3. Worrying too much about different things? 0   4. Trouble relaxing? 1   5. Being so restless that it is hard to sit still? 0   6. Becoming easily annoyed or irritable? 0   7. Feeling afraid as if something awful might happen? 0   FRANCISCO-7 Score 1     FRANCISCO-7 Score: (P) 1  Interpretation: (P) Normal     Client Strengths: verbal, intelligent, successful, good social support, good insight, commitment to wellness, strong jennifer, strong cultural traditions     Diagnosis:     ICD-10-CM ICD-9-CM   1. Major depressive disorder, recurrent, mild  F33.0 296.31   2. Generalized anxiety disorder  F41.1 300.02   3. Malignant carcinoid tumor of duodenum  C7A.010 209.01       Treatment Plan:individual psychotherapy and medication management by physician  Target symptoms: depression, anxiety   Why chosen therapy is appropriate versus another modality: relevant to diagnosis, patient responds to this modality, evidence based practice  Outcome monitoring methods: self-report, observation, checklist/rating scale  Therapeutic intervention type: insight oriented psychotherapy, behavior modifying psychotherapy  Prognosis: Good      Behavioral goals:    Exercise:   Stress management:   Social engagement:   Nutrition:   Smoking Cessation:   Therapy:  Increase daily self-care and attention to health management  Pleasant events scheduling and increased social interaction  Monitor stressors in writing and bring to next visit    Return to clinic: as needed    Next Session:      Length of Service (minutes direct face-to-face contact): 60    Sarai Solo, PhD  Clinical Psychologist  [place] License #[id number]  [place] License #[id number]

## 2023-07-21 ENCOUNTER — CLINICAL SUPPORT (OUTPATIENT)
Dept: REHABILITATION | Facility: HOSPITAL | Age: 58
End: 2023-07-21
Payer: COMMERCIAL

## 2023-07-21 DIAGNOSIS — F43.29 STRESS AND ADJUSTMENT REACTION: Primary | ICD-10-CM

## 2023-07-21 DIAGNOSIS — R53.81 PHYSICAL DECONDITIONING: ICD-10-CM

## 2023-07-21 NOTE — PROGRESS NOTES
[facility]  Occupational Therapy Treatment Note - Therapeutic Yoga Progam    Date: 7/21/2023  Name: Teresa Trujillo  Clinic Number: [id number]    Therapy Diagnosis:   Encounter Diagnoses   Name Primary?    Stress and adjustment reaction Yes    Physical deconditioning      Physician: Abril Chavez PA-C    Physician Orders: Physician Orders: Eval and Treat   Medical Diagnosis from Referral: Malignant carcinoid tumor of duodenum [C7A.010]  Evaluation Date: 5/24/2023  Authorization Period Expiration: 12/30/23  Plan of Care Expiration: 9/24/23  Progress Note Due: 7/24/23  Visit # / Visits authorized: 1/20      Precautions:  Standard and cancer    Time In: 1 pm  Time Out: 2 pm  Total Billable Time: 60 minutes    SUBJECTIVE     Pt reports: She is excited to learn how to relax, find peace and bet stronger.   She was compliant with home exercise program given last session.   Response to previous treatment:eval only  Functional change: eval only    Pain: 0/10      OBJECTIVE     Objective Measures updated at progress report unless specified.    Patient Specific Functional Scale:           Activity 5/24/23           anxiety 2           2.exercising 3           3. Pacing myself 3           4.             5.             6.             SCORE       2.6              Total Score = Sum of activity scores / number of activities  Minimum Detectable Change (90% CII) for average score = 2 points  Minimum Detectable Change (90% CI) for single activity score = 3 points         Treatment     Teresa received the treatments listed below:       Date 7/21/23        Therapeutic Yoga Exercises / Neuromuscular Re-education 45 minutes  minutes  minutes  minutes  minutes  minutes   Seated Yoga   chair yoga:  Cat-Cow,forward bend, back bend, twist,          Quadruped Cat-cow, puppy,         Supine Twist x 3, bridge x 2,        Prone Sphinx and sphinx plank x 3        standing Down dog with chair, chair pose/volcano  with block x 2, shoulder ROM, warrior 2, triangle, side angle with chair, wide straddle forward fold                          Self-Care/Home Management  / Therapeutic Activities  15 minutes  minutes  minutes  minutes  minutes  minutes            Relaxation techniques Singlw nostril breath, body scan        Restorative  Fish and bridge with blocks        Activity Pacing                           Stress Management/Education                       Patient Education and Home Exercises      Education provided:   - - physiology of yoga/meditation and immune health   - Progress towards goals     Written Home Exercises Provided: yes.  Exercises were reviewed and Teresa was able to demonstrate them prior to the end of the session.  Teresa demonstrated good  understanding of the HEP provided. See EMR under Patient Instructions for exercises provided during therapy sessions.       Assessment     In today's session, Teresa was taught a HEP that included standing, supine, quadruped and prone yoga poses.  She was  taught body scan, restorative yoga with blocks, diaphragmatic breathing and single nostril breathing to increase breath capacity.  These self care techniques assist with stabilizing mood and relaxation for immune health.    Teresa is progressing well towards her goals and there are no updates to goals at this time. Pt prognosis is Good. Pt will continue to benefit from skilled outpatient occupational therapy to address the deficits listed in the problem list on initial evaluation provide pt/family education and to maximize pt's level of independence in the home and community environment. Pt's spiritual, cultural and educational needs considered and pt agreeable to plan of care and goals.    Anticipated barriers to occupational therapy: none    Goals:  Short Term Goals: 6 weeks      Goal # Goal Status   1 Patient will demonstrate independence with diaphragmatic breathing in sit and supine. Progressing   2 Pt. will  identify resource for audio body scan to assist with stress management/immune health    3 Patient will identify 2 new stress coping skills for stress management/immune health. Progressing   4 Patient will identify activity pacing problems.  Patient will then implement new plan for daily activity to increase endurance for ADL's. Progressing      Long Term Goals: 12 weeks      Goal # Goal Status   1 Patient will demonstrate independence with yoga Home Exercise Program to increase strength and endurance for ADL's. Progressing   2 Patient will demonstrate independence with relaxation techniques to manage stress for immune health. Progressing   3 Patient will verbalize good understanding of stress/immune health relationship.  Progressing   4             PLAN     Plan of care Certification: 7/21/2023 to 9/24/23.    Outpatient Occupational Therapy 1 times weekly for 12 weeks to include the following interventions: Neuromuscular Re-ed, Patient Education, Self Care, Therapeutic Activities, and Therapeutic Exercise.     Hilton Palacios OT

## 2023-08-09 ENCOUNTER — CLINICAL SUPPORT (OUTPATIENT)
Dept: REHABILITATION | Facility: HOSPITAL | Age: 58
End: 2023-08-09
Payer: COMMERCIAL

## 2023-08-09 DIAGNOSIS — F43.9 STRESS: ICD-10-CM

## 2023-08-09 DIAGNOSIS — R53.81 PHYSICAL DECONDITIONING: Primary | ICD-10-CM

## 2023-08-09 PROCEDURE — 97110 THERAPEUTIC EXERCISES: CPT

## 2023-08-09 PROCEDURE — 97112 NEUROMUSCULAR REEDUCATION: CPT

## 2023-08-09 PROCEDURE — 97535 SELF CARE MNGMENT TRAINING: CPT

## 2023-08-09 NOTE — PROGRESS NOTES
[facility]  Occupational Therapy Progress and Treatment Note - Therapeutic Yoga Progam    Date: 8/9/2023  Name: Teresa Trujillo  Clinic Number: [id number]    Therapy Diagnosis:   Encounter Diagnoses   Name Primary?    Stress     Physical deconditioning Yes       Physician: Abril Chavez PA-C    Physician Orders: Physician Orders: Eval and Treat   Medical Diagnosis from Referral: Malignant carcinoid tumor of duodenum [C7A.010]  Evaluation Date: 5/24/2023  Authorization Period Expiration: 12/30/23  Plan of Care Expiration: 9/24/23  Progress Note Due: 9/9/23  Visit # / Visits authorized: 2/20      Precautions:  Standard and cancer    Time In: 11:15 am  Time Out: 12 pm  Total Billable Time: 45 minutes    SUBJECTIVE     Pt reports: I bought yoga blocks.  She was compliant with home exercise program given last session.   Response to previous treatment: I feel relaxed.  Functional change:  I am able to calm myself a little.    Pain: 0/10      OBJECTIVE     Objective Measures updated at progress report unless specified.    Patient Specific Functional Scale:           Activity 5/24/23 8/9/23          anxiety 2       3         2.exercising 3        4         3. Pacing myself 3        3         4.             5.             6.             SCORE       2.6       3.3            Total Score = Sum of activity scores / number of activities  Minimum Detectable Change (90% CII) for average score = 2 points  Minimum Detectable Change (90% CI) for single activity score = 3 points         Treatment     Teresa received the treatments listed below:       Date 7/21/23 8/9/23       Therapeutic Yoga Exercises / Neuromuscular Re-education 45 minutes  minutes  minutes  minutes  minutes  minutes   Seated Yoga   chair yoga:  Cat-Cow,forward bend, back bend, twist,   Hero on 2 blocks,        Quadruped Cat-cow, puppy,  Cat-cow, puppy,        Supine Twist x 3, bridge x 2, twist x 3, bridge x 2,       Prone Sphinx  and sphinx plank x 3 Sphinx and sphinx plank x 3       standing Down dog with chair, chair pose/volcano with block x 2, shoulder ROM, warrior 2, triangle, side angle with chair, wide straddle forward fold down dog with chair, chair pose/volcano with block x 2, shoulder ROM, warrior 2, triangle, side angle, wide straddle forward fold                         Self-Care/Home Management  / Therapeutic Activities  15 minutes  15 minutes  minutes  minutes  minutes  minutes            Relaxation techniques Singlw nostril breath, body scan Single nostril breath, body scan       Restorative  Fish and bridge with blocks Fish and bridge with blocks       Activity Pacing                           Stress Management/Education   - physiology of yoga/meditation and immune health                     Patient Education and Home Exercises      Education provided:   - - physiology of yoga/meditation and immune health   - Progress towards goals     Written Home Exercises Provided: yes.  Exercises were reviewed and Teresa was able to demonstrate them prior to the end of the session.  Teresa demonstrated good  understanding of the HEP provided. See EMR under Patient Instructions for exercises provided during therapy sessions.       Assessment     In today's session, Teresa reviewed her HEP that included standing, supine, quadruped and prone yoga poses.  We practiced  body scan, restorative yoga with blocks, diaphragmatic breathing and single nostril breathing to increase breath capacity. She demonstrated a good understanding of the role of self care techniques to assist with stabilizing mood and relaxation for immune health. Teresa tolerated this well and required maximum assist for verbal and neuromuscular cues.     PROGRESS UPDATE:Teresa is progressing well towards her goals and there are no updates to goals at this time.  She has only had 2 sessions thus far and PSFS score change is minimal, from 2.6 to 3.3.  We expect a  greater  improvement with next progress update.   Pt prognosis is Good. Pt will continue to benefit from skilled outpatient occupational therapy to address the deficits listed in the problem list on initial evaluation provide pt/family education and to maximize pt's level of independence in the home and community environment. Pt's spiritual, cultural and educational needs considered and pt agreeable to plan of care and goals.    Anticipated barriers to occupational therapy: none    Goals:  Short Term Goals: 6 weeks      Goal # Goal Status   1 Patient will demonstrate independence with diaphragmatic breathing in sit and supine. Progressing   2 Pt. will identify resource for audio body scan to assist with stress management/immune health    3 Patient will identify 2 new stress coping skills for stress management/immune health. Progressing   4 Patient will identify activity pacing problems.  Patient will then implement new plan for daily activity to increase endurance for ADL's. Progressing      Long Term Goals: 12 weeks      Goal # Goal Status   1 Patient will demonstrate independence with yoga Home Exercise Program to increase strength and endurance for ADL's. Progressing   2 Patient will demonstrate independence with relaxation techniques to manage stress for immune health. Progressing   3 Patient will verbalize good understanding of stress/immune health relationship.  Progressing   4             PLAN     Plan of care Certification: 8/9/2023 to 9/24/23.    Outpatient Occupational Therapy 1 times weekly for 12 weeks to include the following interventions: Neuromuscular Re-ed, Patient Education, Self Care, Therapeutic Activities, and Therapeutic Exercise.     Hilton Palacios OT

## 2023-08-16 ENCOUNTER — CLINICAL SUPPORT (OUTPATIENT)
Dept: REHABILITATION | Facility: HOSPITAL | Age: 58
End: 2023-08-16
Payer: COMMERCIAL

## 2023-08-16 DIAGNOSIS — R53.81 PHYSICAL DECONDITIONING: ICD-10-CM

## 2023-08-16 DIAGNOSIS — F43.9 STRESS: Primary | ICD-10-CM

## 2023-08-16 DIAGNOSIS — F43.29 STRESS AND ADJUSTMENT REACTION: ICD-10-CM

## 2023-08-16 PROCEDURE — 97110 THERAPEUTIC EXERCISES: CPT

## 2023-08-16 PROCEDURE — 97535 SELF CARE MNGMENT TRAINING: CPT

## 2023-08-16 NOTE — PROGRESS NOTES
[facility]  Occupational Therapy Treatment Note - Therapeutic Yoga Progam    Date: 8/16/2023  Name: Teresa Trujillo  Clinic Number: [id number]    Therapy Diagnosis:   Encounter Diagnoses   Name Primary?    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   She was compliant with home exercise program given last session.   Response to previous treatment: I practiced longer at home.  Functional change:  I am able to calm myself more and more.    Pain: 0/10      OBJECTIVE     Objective Measures updated at progress report unless specified.    Patient Specific Functional Scale:           Activity 5/24/23 8/9/23          anxiety 2       3         2.exercising 3        4         3. Pacing myself 3        3         4.             5.             6.             SCORE       2.6       3.3            Total Score = Sum of activity scores / number of activities  Minimum Detectable Change (90% CII) for average score = 2 points  Minimum Detectable Change (90% CI) for single activity score = 3 points         Treatment     Teresa received the treatments listed below:       Date 7/21/23 8/9/23 8/16/23      Therapeutic Yoga Exercises / Neuromuscular Re-education 45 minutes  45 minutes  PN  30 minutes  minutes  minutes  minutes   Seated Yoga   chair yoga:  Cat-Cow,forward bend, back bend, twist,   Hero on 2 blocks,  Floor: Seated twist, alignment sitting on  bolster and block in cross legged for breathing practice.      Quadruped Cat-cow, puppy,  Cat-cow, puppy,  Puppy flow      Supine Twist x 3, bridge x 2, twist x 3, bridge x 2, twist x 3, bridge x 2, modified boat, happy baby flow       Prone Sphinx and sphinx plank x 3 Sphinx and sphinx plank x 3 Sphinx and sphinx plank x 2      standing Down dog with chair, chair pose/volcano with block x 2, shoulder ROM, warrior 2, triangle, side angle with chair, wide straddle forward fold down dog with chair, chair pose/volcano with block x 2, shoulder ROM, warrior 2, triangle, side angle, wide straddle forward fold down dog with chair, chair pose/volcano with block x 2, shoulder ROM, warrior 2, triangle, side angle, wide straddle forward fold                        Self-Care/Home Management  / Therapeutic Activities  15 minutes  15 minutes  15 minutes  minutes  minutes  minutes            Relaxation techniques Singlw nostril breath, body scan Single nostril breath, body scan DB, Single nostril breath, body scan      Restorative  Fish and bridge with blocks Fish and bridge with blocks Fish and bridge with blocks      Activity Pacing                           Stress Management/Education   - physiology of yoga/meditation and immune health                     Patient Education and Home Exercises      Education provided:   - - physiology of yoga/meditation and immune health   - Progress towards goals     Written Home Exercises Provided: yes.  Exercises were reviewed and Teresa was able to demonstrate them prior to the end of the session.  Teresa demonstrated good  understanding of the HEP provided. See EMR under Patient Instructions for exercises provided during therapy sessions.       Assessment     In today's session, Teresa reviewed her HEP that included standing, supine, quadruped and prone yoga poses.  We practiced  body scan, restorative yoga with blocks, diaphragmatic breathing and single nostril breathing to increase breath  capacity. She demonstrated a good understanding of quieting thoughts by using awareness of sensation in the body for relaxation for immune health. Teresa tolerated this well and required maximum assist for verbal and neuromuscular cues. She is also attending the yoga class held in the [facility].    PROGRESS UPDATE:Teresa is progressing well towards her goals and there are no updates to goals at this time.  She has only had 2 sessions thus far and PSFS score change is minimal, from 2.6 to 3.3.  We expect a  greater improvement with next progress update.   Pt prognosis is Good. Pt will continue to benefit from skilled outpatient occupational therapy to address the deficits listed in the problem list on initial evaluation provide pt/family education and to maximize pt's level of independence in the home and community environment. Pt's spiritual, cultural and educational needs considered and pt agreeable to plan of care and goals.    Anticipated barriers to occupational therapy: none    Goals:  Short Term Goals: 6 weeks      Goal # Goal Status   1 Patient will demonstrate independence with diaphragmatic breathing in sit and supine. met   2 Pt. will identify resource for audio body scan to assist with stress management/immune health    3 Patient will identify 2 new stress coping skills for stress management/immune health. Progressing   4 Patient will identify activity pacing problems.  Patient will then implement new plan for daily activity to increase endurance for ADL's. Progressing      Long Term Goals: 12 weeks      Goal # Goal Status   1 Patient will demonstrate independence with yoga Home Exercise Program to increase strength and endurance for ADL's. Progressing   2 Patient will demonstrate independence with relaxation techniques to manage stress for immune health. Progressing   3 Patient will verbalize good understanding of stress/immune health relationship.  Progressing   4             PLAN     Plan of  care Certification: 8/16/2023 to 9/24/23.    Outpatient Occupational Therapy 1 times weekly for 12 weeks to include the following interventions: Neuromuscular Re-ed, Patient Education, Self Care, Therapeutic Activities, and Therapeutic Exercise.     Hilton Palacios OT

## 2023-08-23 ENCOUNTER — CLINICAL SUPPORT (OUTPATIENT)
Dept: REHABILITATION | Facility: HOSPITAL | Age: 58
End: 2023-08-23
Payer: COMMERCIAL

## 2023-08-23 DIAGNOSIS — R53.81 PHYSICAL DECONDITIONING: ICD-10-CM

## 2023-08-23 DIAGNOSIS — F43.9 STRESS: Primary | ICD-10-CM

## 2023-08-23 DIAGNOSIS — F43.29 STRESS AND ADJUSTMENT REACTION: ICD-10-CM

## 2023-08-23 PROCEDURE — 97112 NEUROMUSCULAR REEDUCATION: CPT

## 2023-08-23 PROCEDURE — 97535 SELF CARE MNGMENT TRAINING: CPT

## 2023-08-23 PROCEDURE — 97110 THERAPEUTIC EXERCISES: CPT

## 2023-08-23 NOTE — PROGRESS NOTES
[facility]  Occupational Therapy Treatment Note - Therapeutic Yoga Progam    Date: 8/23/2023  Name: Teresa Trujillo  Clinic Number: [id number]    Therapy Diagnosis:   Encounter Diagnoses   Name Primary?    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   Response to previous treatment: I felt like I undeerstand better.  Functional change:  I am able to calm myself more and more.    Pain: 0/10      OBJECTIVE     Objective Measures updated at progress report unless specified.    Patient Specific Functional Scale:           Activity 5/24/23 8/9/23          anxiety 2       3         2.exercising 3        4         3. Pacing myself 3        3         4.             5.             6.             SCORE       2.6       3.3            Total Score = Sum of activity scores / number of activities  Minimum Detectable Change (90% CII) for average score = 2 points  Minimum Detectable Change (90% CI) for single activity score = 3 points         Treatment     Teresa received the treatments listed below:       Date 7/21/23 8/9/23 8/16/23 8/23/23       Therapeutic Yoga Exercises / Neuromuscular Re-education 45 minutes  45 minutes  PN  30 minutes  30 minutes  minutes  minutes   Seated Yoga   chair yoga:  Cat-Cow,forward bend, back bend, twist,   Hero on 2 blocks,  Floor: Seated twist, alignment sitting on bolster  and block in cross legged for breathing practice. Floor: Seated twist, alignment sitting on bolster in heroes pose  for breathing practice.     Quadruped Cat-cow, puppy,  Cat-cow, puppy,  Puppy flow Puppy flow     Supine Twist x 3, bridge x 2, twist x 3, bridge x 2, twist x 3, bridge x 2, modified boat, happy baby flow  wist x 3, bridge x 2, modified boat, happy baby flow      Prone Sphinx and sphinx plank x 3 Sphinx and sphinx plank x 3 Sphinx and sphinx plank x 2 Sphinx and sphinx plank x 2     standing Down dog with chair, chair pose/volcano with block x 2, shoulder ROM, warrior 2, triangle, side angle with chair, wide straddle forward fold down dog with chair, chair pose/volcano with block x 2, shoulder ROM, warrior 2, triangle, side angle, wide straddle forward fold down dog with chair, chair pose/volcano with block x 2, shoulder ROM, warrior 2, triangle, side angle, wide straddle forward fold down dog with chair, chair pose/volcano with block x 2, shoulder ROM, warrior 2, triangle, side angle, wide straddle forward fold                       Self-Care/Home Management  / Therapeutic Activities  15 minutes  15 minutes  15 minutes  15 minutes  minutes  minutes            Relaxation techniques Singlw nostril breath, body scan Single nostril breath, body scan DB, Single nostril breath, body scan DB, Single nostril breath, body scan     Restorative  Fish and bridge with blocks Fish and bridge with blocks Fish and bridge with blocks Supine with calves on chair     Activity Pacing                           Stress Management/Education   - physiology of yoga/meditation and immune health  - physiology of yoga/meditation and immune health                    Patient Education and Home Exercises      Education provided:   - - physiology of yoga/meditation and immune health   - Progress towards goals     Written Home Exercises Provided: yes.  Exercises were reviewed and Teresa was able to demonstrate them prior to the end of  the session.  Teresa demonstrated good  understanding of the HEP provided. See EMR under Patient Instructions for exercises provided during therapy sessions.       Assessment     In today's session, Teresa reviewed her HEP that included standing, supine, quadruped and prone yoga poses.  We practiced  body scan, restorative yoga with blocks, diaphragmatic breathing and single nostril breathing to increase breath capacity. She demonstrated a good understanding of quieting thoughts by using awareness of sensation in the body for relaxation for immune health. Teresa tolerated this well and required maximum assist for verbal and neuromuscular cues. She continu es  attend the yoga class held in the [facility].    PROGRESS UPDATE:Teresa is progressing well towards her goals and there are no updates to goals at this time.  She has only had 2 sessions thus far and PSFS score change is minimal, from 2.6 to 3.3.  We expect a  greater improvement with next progress update.   Pt prognosis is Good. Pt will continue to benefit from skilled outpatient occupational therapy to address the deficits listed in the problem list on initial evaluation provide pt/family education and to maximize pt's level of independence in the home and community environment. Pt's spiritual, cultural and educational needs considered and pt agreeable to plan of care and goals.    Anticipated barriers to occupational therapy: none    Goals:  Short Term Goals: 6 weeks      Goal # Goal Status   1 Patient will demonstrate independence with diaphragmatic breathing in sit and supine. met   2 Pt. will identify resource for audio body scan to assist with stress management/immune health    3 Patient will identify 2 new stress coping skills for stress management/immune health. Progressing   4 Patient will identify activity pacing problems.  Patient will then implement new plan for daily activity to increase endurance for ADL's. Progressing      Long Term  Goals: 12 weeks      Goal # Goal Status   1 Patient will demonstrate independence with yoga Home Exercise Program to increase strength and endurance for ADL's. Progressing   2 Patient will demonstrate independence with relaxation techniques to manage stress for immune health. Progressing   3 Patient will verbalize good understanding of stress/immune health relationship.  Progressing   4             PLAN     Plan of care Certification: 8/23/2023 to 9/24/23.    Outpatient Occupational Therapy 1 times weekly for 12 weeks to include the following interventions: Neuromuscular Re-ed, Patient Education, Self Care, Therapeutic Activities, and Therapeutic Exercise.     Hilton Palacios, OT

## 2023-08-30 ENCOUNTER — CLINICAL SUPPORT (OUTPATIENT)
Dept: REHABILITATION | Facility: HOSPITAL | Age: 58
End: 2023-08-30
Payer: COMMERCIAL

## 2023-08-30 DIAGNOSIS — F43.9 STRESS: Primary | ICD-10-CM

## 2023-08-30 DIAGNOSIS — R53.81 PHYSICAL DECONDITIONING: ICD-10-CM

## 2023-08-30 DIAGNOSIS — F43.29 STRESS AND ADJUSTMENT REACTION: ICD-10-CM

## 2023-08-30 DIAGNOSIS — E11.9 TYPE 2 DIABETES MELLITUS WITHOUT COMPLICATION: ICD-10-CM

## 2023-08-30 PROCEDURE — 97165 OT EVAL LOW COMPLEX 30 MIN: CPT

## 2023-08-30 PROCEDURE — 97535 SELF CARE MNGMENT TRAINING: CPT

## 2023-08-30 PROCEDURE — 97112 NEUROMUSCULAR REEDUCATION: CPT

## 2023-08-30 PROCEDURE — 97110 THERAPEUTIC EXERCISES: CPT

## 2023-08-30 NOTE — PROGRESS NOTES
[facility]  Occupational Therapy Treatment Note - Therapeutic Yoga Progam    Date: 8/30/2023  Name: Teresa Trujillo  Clinic Number: [id number]    Therapy Diagnosis:   Encounter Diagnoses   Name Primary?    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   Response to previous treatment: I felt like I undeerstand better.  Functional change:  I am able to calm myself more and more.    Pain: 0/10      OBJECTIVE     Objective Measures updated at progress report unless specified.    Patient Specific Functional Scale:           Activity 5/24/23 8/9/23          anxiety 2       3         2.exercising 3        4         3. Pacing myself 3        3         4.             5.             6.             SCORE       2.6       3.3            Total Score = Sum of activity scores / number of activities  Minimum Detectable Change (90% CII) for average score = 2 points  Minimum Detectable Change (90% CI) for single activity score = 3 points         Treatment     Teresa received the treatments listed below:       Date 7/21/23 8/9/23 8/16/23 8/23/23 8/30/23    Therapeutic Yoga Exercises / Neuromuscular Re-education 45 minutes  45 minutes  PN  30 minutes  30 minutes  30 minutes  minutes   Seated Yoga   chair yoga:  Cat-Cow,forward bend, back bend, twist,   Hero on 2 blocks,  Floor: Seated twist, alignment sitting  on bolster and block in cross legged for breathing practice. Floor: Seated twist, alignment sitting on bolster in heroes pose  for breathing practice. On bolster: bilateral  hamstring stretch with belt at feet, heroes for breathing practice,    Quadruped Cat-cow, puppy,  Cat-cow, puppy,  Puppy flow Puppy flow Puppy flow    Supine Twist x 3, bridge x 2, twist x 3, bridge x 2, twist x 3, bridge x 2, modified boat, happy baby flow  wist x 3, bridge x 2, modified boat, happy baby flow  twist x 3, bridge x 2, modified boat, happy baby flow     Prone Sphinx and sphinx plank x 3 Sphinx and sphinx plank x 3 Sphinx and sphinx plank x 2 Sphinx and sphinx plank x 2 [place] pose with bolster and blankets x 2     standing Down dog with chair, chair pose/volcano with block x 2, shoulder ROM, warrior 2, triangle, side angle with chair, wide straddle forward fold down dog with chair, chair pose/volcano with block x 2, shoulder ROM, warrior 2, triangle, side angle, wide straddle forward fold down dog with chair, chair pose/volcano with block x 2, shoulder ROM, warrior 2, triangle, side angle, wide straddle forward fold down dog with chair, chair pose/volcano with block x 2, shoulder ROM, warrior 2, triangle, side angle, wide straddle forward fold Tree pose x 2 with modifications, warrior 2, side angle, chair/volcano x 2,                      Self-Care/Home Management  / Therapeutic Activities  15 minutes  15 minutes  15 minutes  15 minutes  15 minutes  minutes            Relaxation techniques Singlw nostril breath, body scan Single nostril breath, body scan DB, Single nostril breath, body scan DB, Single nostril breath, body scan DB, Single nostril breath, body scan    Restorative  Fish and bridge with blocks Fish and bridge with blocks Fish and bridge with blocks Supine with calves on chair Bridge at wall with 2 bolsters, twist on bolster    Activity Pacing                           Stress Management/Education   - physiology of  yoga/meditation and immune health  - physiology of yoga/meditation and immune health   - physiology of yoga/meditation and immune health                  Patient Education and Home Exercises      Education provided:   - - physiology of yoga/meditation and immune health   - Progress towards goals     Written Home Exercises Provided: yes.  Exercises were reviewed and Teresa was able to demonstrate them prior to the end of the session.  Teresa demonstrated good  understanding of the HEP provided. See EMR under Patient Instructions for exercises provided during therapy sessions.       Assessment     In today's session, Teresa reviewed her HEP that included standing, supine, quadruped and prone yoga poses.  She was taught tree pose today and added this to her HEP.   We practiced  body scan, restorative yoga with blocks, diaphragmatic breathing and single nostril breathing to increase breath capacity. She demonstrated a good understanding of quieting thoughts by using awareness of sensation in the body for relaxation for immune health. Teresa tolerated this well and required maximum assist for verbal and neuromuscular cues. She continues  attend the yoga class held in the [facility].    PROGRESS UPDATE:Teresa is progressing well towards her goals and there are no updates to goals at this time.  She has only had 2 sessions thus far and PSFS score change is minimal, from 2.6 to 3.3.  We expect a  greater improvement with next progress update.   Pt prognosis is Good. Pt will continue to benefit from skilled outpatient occupational therapy to address the deficits listed in the problem list on initial evaluation provide pt/family education and to maximize pt's level of independence in the home and community environment. Pt's spiritual, cultural and educational needs considered and pt agreeable to plan of care and goals.    Anticipated barriers to occupational therapy: none    Goals:  Short Term Goals: 6 weeks       Goal # Goal Status   1 Patient will demonstrate independence with diaphragmatic breathing in sit and supine. met   2 Pt. will identify resource for audio body scan to assist with stress management/immune health    3 Patient will identify 2 new stress coping skills for stress management/immune health. Progressing   4 Patient will identify activity pacing problems.  Patient will then implement new plan for daily activity to increase endurance for ADL's. Progressing      Long Term Goals: 12 weeks      Goal # Goal Status   1 Patient will demonstrate independence with yoga Home Exercise Program to increase strength and endurance for ADL's. Progressing   2 Patient will demonstrate independence with relaxation techniques to manage stress for immune health. Progressing   3 Patient will verbalize good understanding of stress/immune health relationship.  Progressing   4             PLAN     Plan of care Certification: 8/30/2023 to 9/24/23.    Outpatient Occupational Therapy 1 times weekly for 12 weeks to include the following interventions: Neuromuscular Re-ed, Patient Education, Self Care, Therapeutic Activities, and Therapeutic Exercise.     Hilton Palacios, OT

## 2023-09-05 ENCOUNTER — PATIENT MESSAGE (OUTPATIENT)
Dept: ADMINISTRATIVE | Facility: HOSPITAL | Age: 58
End: 2023-09-05
Payer: COMMERCIAL

## 2023-09-06 ENCOUNTER — CLINICAL SUPPORT (OUTPATIENT)
Dept: REHABILITATION | Facility: HOSPITAL | Age: 58
End: 2023-09-06
Payer: COMMERCIAL

## 2023-09-06 DIAGNOSIS — F43.9 STRESS: Primary | ICD-10-CM

## 2023-09-06 DIAGNOSIS — F43.29 STRESS AND ADJUSTMENT REACTION: ICD-10-CM

## 2023-09-06 DIAGNOSIS — R53.81 PHYSICAL DECONDITIONING: ICD-10-CM

## 2023-09-06 PROCEDURE — 97535 SELF CARE MNGMENT TRAINING: CPT

## 2023-09-06 PROCEDURE — 97110 THERAPEUTIC EXERCISES: CPT

## 2023-09-06 PROCEDURE — 97112 NEUROMUSCULAR REEDUCATION: CPT

## 2023-09-06 NOTE — PROGRESS NOTES
[facility]  Occupational Therapy Progress and Treatment Note - Therapeutic Yoga Progam    Date: 9/6/2023  Name: Teresa Trujillo  Clinic Number: [id number]    Therapy Diagnosis:   Encounter Diagnoses   Name Primary?    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   Response to previous treatment: I felt like I undeerstand better.  Functional change:  I am able to calm myself more and more.    Pain: 0/10      OBJECTIVE     Objective Measures updated at progress report unless specified.    Patient Specific Functional Scale:           Activity 5/24/23 8/9/23 9/6/23       anxiety 2       3    4       2.exercising 3        4     5       3. Pacing myself 3        3      4       4.             5.             6.             SCORE       2.6       3.3    4.3          Total Score = Sum of activity scores / number of activities  Minimum Detectable Change (90% CII) for average score = 2 points  Minimum Detectable Change (90% CI) for single activity score = 3 points         Treatment     Teresa received the treatments listed below:       Date 7/21/23 8/9/23 8/16/23 8/23/23 8/30/23 9/6/23   Therapeutic Yoga Exercises / Neuromuscular Re-education 45 minutes  45 minutes  PN  30 minutes  30 minutes  30 minutes  30 minutes  PN   Seated Yoga   chair yoga:  Cat-Cow,forward bend, back bend, twist,   Hero on 2  blocks,  Floor: Seated twist, alignment sitting on bolster and block in cross legged for breathing practice. Floor: Seated twist, alignment sitting on bolster in heroes pose  for breathing practice. On bolster: bilateral  hamstring stretch with belt at feet, heroes for breathing practice, On bolster: bilateral  hamstring stretch with belt at feet, heroes for breathing practice,   Quadruped Cat-cow, puppy,  Cat-cow, puppy,  Puppy flow Puppy flow Puppy flow Plank x 3, low lunge x 2,   Supine Twist x 3, bridge x 2, twist x 3, bridge x 2, twist x 3, bridge x 2, modified boat, happy baby flow  wist x 3, bridge x 2, modified boat, happy baby flow  twist x 3, bridge x 2, modified boat, happy baby flow  wist x 3, bridge x 2, modified boat, happy baby flow    Prone Sphinx and sphinx plank x 3 Sphinx and sphinx plank x 3 Sphinx and sphinx plank x 2 Sphinx and sphinx plank x 2 [place] pose with bolster and blankets x 2     standing Down dog with chair, chair pose/volcano with block x 2, shoulder ROM, warrior 2, triangle, side angle with chair, wide straddle forward fold down dog with chair, chair pose/volcano with block x 2, shoulder ROM, warrior 2, triangle, side angle, wide straddle forward fold down dog with chair, chair pose/volcano with block x 2, shoulder ROM, warrior 2, triangle, side angle, wide straddle forward fold down dog with chair, chair pose/volcano with block x 2, shoulder ROM, warrior 2, triangle, side angle, wide straddle forward fold Tree pose x 2 with modifications, warrior 2, side angle, chair/volcano x 2, Down dog x 3 and Ddog  with chair, warrior 2, side angle, chair/volcano x 2, bilateral shoulder retraction with green theraband                     Self-Care/Home Management  / Therapeutic Activities  15 minutes  15 minutes  15 minutes  15 minutes  15 minutes  15 minutes            Relaxation techniques Singlw nostril breath, body scan Single nostril breath, body scan DB, Single nostril breath, body scan DB,  Single nostril breath, body scan DB, Single nostril breath, body scan DB, Single nostril breath, body scan   Restorative  Fish and bridge with blocks Fish and bridge with blocks Fish and bridge with blocks Supine with calves on chair Bridge at wall with 2 bolsters, twist on bolster Supine with calves on chair and bolster under hips   Activity Pacing                           Stress Management/Education   - physiology of yoga/meditation and immune health  - physiology of yoga/meditation and immune health   - physiology of yoga/meditation and immune health  - physiology of yoga/meditation and immune health                 Patient Education and Home Exercises      Education provided:   - - physiology of yoga/meditation and immune health   - Progress towards goals     Written Home Exercises Provided: yes.  Exercises were reviewed and Teresa was able to demonstrate them prior to the end of the session.  Teresa demonstrated good  understanding of the HEP provided. See EMR under Patient Instructions for exercises provided during therapy sessions.       Assessment     In today's session, Teresa reviewed her HEP that included standing, supine, quadruped and prone yoga poses.   We practiced  body scan, restorative yoga with blocks, diaphragmatic breathing and single nostril breathing to increase breath capacity. She demonstrated a good understanding of quieting thoughts by using awareness of sensation in the body for relaxation for immune health. Teresa tolerated this well and required maximum assist for verbal and neuromuscular cues. She continues  attend the yoga class held in the [facility].    PROGRESS UPDATE:Teresa is progressing well towards her goals, see updates to goals.  She has only had 6 sessions thus far and PSFS score change  from 2.6 to 4.3. This indicated improvement in her anxiety, daily exercise compliant and pacing.She is consistently attending the yoga class here with her caregiver,(). We   identified relaxation apps to assist  Pt prognosis is Good. Pt will continue to benefit from skilled outpatient occupational therapy to address the deficits listed in the problem list on initial evaluation provide pt/family education and to maximize pt's level of independence in the home and community environment. Pt's spiritual, cultural and educational needs considered and pt agreeable to plan of care and goals.    Anticipated barriers to occupational therapy: none    Goals:  Short Term Goals: 6 weeks      Goal # Goal Status   1 Patient will demonstrate independence with diaphragmatic breathing in sit and supine. met   2 Pt. will identify resource for audio body scan to assist with stress management/immune health met   3 Patient will identify 2 new stress coping skills for stress management/immune health. Progressing   4 Patient will identify activity pacing problems.  Patient will then implement new plan for daily activity to increase endurance for ADL's. Progressing      Long Term Goals: 12 weeks      Goal # Goal Status   1 Patient will demonstrate independence with yoga Home Exercise Program to increase strength and endurance for ADL's. Progressing   2 Patient will demonstrate independence with relaxation techniques to manage stress for immune health. Progressing   3 Patient will verbalize good understanding of stress/immune health relationship.  Progressing   4             PLAN     Plan of care Certification: 9/6/2023 to 9/24/23.    Outpatient Occupational Therapy 1 times weekly for 12 weeks to include the following interventions: Neuromuscular Re-ed, Patient Education, Self Care, Therapeutic Activities, and Therapeutic Exercise.     Hilton Palacios OT

## 2023-09-20 ENCOUNTER — CLINICAL SUPPORT (OUTPATIENT)
Dept: REHABILITATION | Facility: HOSPITAL | Age: 58
End: 2023-09-20
Payer: COMMERCIAL

## 2023-09-20 DIAGNOSIS — R53.81 PHYSICAL DECONDITIONING: ICD-10-CM

## 2023-09-20 DIAGNOSIS — F43.9 STRESS: Primary | ICD-10-CM

## 2023-09-20 DIAGNOSIS — F43.29 STRESS AND ADJUSTMENT REACTION: ICD-10-CM

## 2023-09-20 PROCEDURE — 97535 SELF CARE MNGMENT TRAINING: CPT

## 2023-09-20 PROCEDURE — 97110 THERAPEUTIC EXERCISES: CPT

## 2023-09-20 PROCEDURE — 97112 NEUROMUSCULAR REEDUCATION: CPT

## 2023-09-20 NOTE — PROGRESS NOTES
[facility]  Occupational Therapy  Treatment Note - Therapeutic Yoga Progam    Date: 9/20/2023  Name: Teresa Trujillo  Clinic Number: [id number]    Therapy Diagnosis:   Encounter Diagnoses   Name Primary?    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  I am very stressed.  She was compliant with home exercise program given last session.   Response to previous treatment: I   Functional change:  I am able to calm myself more and more.    Pain: 0/10      OBJECTIVE     Objective Measures updated at progress report unless specified.    Patient Specific Functional Scale:           Activity 5/24/23 8/9/23 9/6/23       anxiety 2       3    4       2.exercising 3        4     5       3. Pacing myself 3        3      4       4.             5.             6.             SCORE       2.6       3.3    4.3          Total Score = Sum of activity scores / number of activities  Minimum Detectable Change (90% CII) for average score = 2 points  Minimum Detectable Change (90% CI) for single activity score = 3 points         Treatment     Teresa received the treatments listed below:       Date 7/21/23 8/9/23 8/16/23 8/23/23 8/30/23 9/6/23 9/19/23   Therapeutic Yoga Exercises / Neuromuscular Re-education 45 minutes  45 minutes  PN  30 minutes  30 minutes  30 minutes  30 minutes  PN 30 minutes   Seated Yoga   chair yoga:  Cat-Cow,forward bend, back bend, twist,   Hero on 2  blocks,  Floor: Seated twist, alignment sitting on bolster and block in cross legged for breathing practice. Floor: Seated twist, alignment sitting on bolster in heroes pose  for breathing practice. On bolster: bilateral  hamstring stretch with belt at feet, heroes for breathing practice, On bolster: bilateral  hamstring stretch with belt at feet, heroes for breathing practice, heroes for breathing practice,   Quadruped Cat-cow, puppy,  Cat-cow, puppy,  Puppy flow Puppy flow Puppy flow Plank x 3, low lunge x 2, Puppy flow   Supine Twist x 3, bridge x 2, twist x 3, bridge x 2, twist x 3, bridge x 2, modified boat, happy baby flow  wist x 3, bridge x 2, modified boat, happy baby flow  twist x 3, bridge x 2, modified boat, happy baby flow  wist x 3, bridge x 2, modified boat, happy baby flow  twist x 3, bridge x 2, modified boat, happy baby flow    Prone Sphinx and sphinx plank x 3 Sphinx and sphinx plank x 3 Sphinx and sphinx plank x 2 Sphinx and sphinx plank x 2 [place] pose with bolster and blankets x 2   Sphinx and sphinx plank x 2   standing Down dog with chair, chair pose/volcano with block x 2, shoulder ROM, warrior 2, triangle, side angle with chair, wide straddle forward fold down dog with chair, chair pose/volcano with block x 2, shoulder ROM, warrior 2, triangle, side angle, wide straddle forward fold down dog with chair, chair pose/volcano with block x 2, shoulder ROM, warrior 2, triangle, side angle, wide straddle forward fold down dog with chair, chair pose/volcano with block x 2, shoulder ROM, warrior 2, triangle, side angle, wide straddle forward fold Tree pose x 2 with modifications, warrior 2, side angle, chair/volcano x 2, Down dog x 3 and Ddog  with chair, warrior 2, side angle, chair/volcano x 2, bilateral shoulder retraction with green theraband Down dog x 3 and Ddog  with chair, warrior 2, side angle, chair/volcano x 2,                       Self-Care/Home Management  / Therapeutic Activities  15  minutes  15 minutes  15 minutes  15 minutes  15 minutes  15 minutes 15 minutes             Relaxation techniques Singlw nostril breath, body scan Single nostril breath, body scan DB, Single nostril breath, body scan DB, Single nostril breath, body scan DB, Single nostril breath, body scan DB, Single nostril breath, body scan    Restorative  Fish and bridge with blocks Fish and bridge with blocks Fish and bridge with blocks Supine with calves on chair Bridge at wall with 2 bolsters, twist on bolster Supine with calves on chair and bolster under hips    Activity Pacing                              Stress Management/Education   - physiology of yoga/meditation and immune health  - physiology of yoga/meditation and immune health   - physiology of yoga/meditation and immune health  - physiology of yoga/meditation and immune health                   Patient Education and Home Exercises      Education provided:   - - physiology of yoga/meditation and immune health   - Progress towards goals     Written Home Exercises Provided: yes.  Exercises were reviewed and Teresa was able to demonstrate them prior to the end of the session.  Teresa demonstrated good  understanding of the HEP provided. See EMR under Patient Instructions for exercises provided during therapy sessions.       Assessment     In today's session, Teresa reviewed her HEP that included standing, supine, quadruped and prone yoga poses.   We practiced  body scan, restorative yoga with blocks, diaphragmatic breathing and single nostril breathing to increase breath capacity. She demonstrated a good understanding of quieting thoughts by using awareness of sensation in the body for relaxation for immune health.She ws tearful relating to family issues and OT recommended she contact her clinical therapist to resume counseling.   Teresa tolerated this well and required maximum assist for verbal and neuromuscular cues. She continues  attend the yoga class held in the  [facility].    PROGRESS UPDATE:Teresa is progressing well towards her goals, see updates to goals.  She has only had 6 sessions thus far and PSFS score change  from 2.6 to 4.3. This indicated improvement in her anxiety, daily exercise compliant and pacing.She is consistently attending the yoga class here with her caregiver,(). We  identified relaxation apps to assist  Pt prognosis is Good. Pt will continue to benefit from skilled outpatient occupational therapy to address the deficits listed in the problem list on initial evaluation provide pt/family education and to maximize pt's level of independence in the home and community environment. Pt's spiritual, cultural and educational needs considered and pt agreeable to plan of care and goals.    Anticipated barriers to occupational therapy: none    Goals:  Short Term Goals: 6 weeks      Goal # Goal Status   1 Patient will demonstrate independence with diaphragmatic breathing in sit and supine. met   2 Pt. will identify resource for audio body scan to assist with stress management/immune health met   3 Patient will identify 2 new stress coping skills for stress management/immune health. Progressing   4 Patient will identify activity pacing problems.  Patient will then implement new plan for daily activity to increase endurance for ADL's. Progressing      Long Term Goals: 12 weeks      Goal # Goal Status   1 Patient will demonstrate independence with yoga Home Exercise Program to increase strength and endurance for ADL's. Progressing   2 Patient will demonstrate independence with relaxation techniques to manage stress for immune health. Progressing   3 Patient will verbalize good understanding of stress/immune health relationship.  Progressing   4             PLAN     Plan of care Certification: 9/20/2023 to 9/24/23.    Outpatient Occupational Therapy 1 times weekly for 12 weeks to include the following interventions: Neuromuscular Re-ed, Patient  Education, Self Care, Therapeutic Activities, and Therapeutic Exercise.     Hilton Palacios, OT

## 2023-09-23 ENCOUNTER — OFFICE VISIT (OUTPATIENT)
Dept: URGENT CARE | Facility: CLINIC | Age: 58
End: 2023-09-23
Payer: COMMERCIAL

## 2023-09-23 VITALS
RESPIRATION RATE: 19 BRPM | SYSTOLIC BLOOD PRESSURE: 135 MMHG | WEIGHT: 135 LBS | DIASTOLIC BLOOD PRESSURE: 86 MMHG | OXYGEN SATURATION: 98 % | HEART RATE: 74 BPM | HEIGHT: 69 IN | BODY MASS INDEX: 19.99 KG/M2 | TEMPERATURE: 99 F

## 2023-09-23 DIAGNOSIS — Z11.3 SCREENING EXAMINATION FOR STD (SEXUALLY TRANSMITTED DISEASE): Primary | ICD-10-CM

## 2023-09-23 LAB
HAV IGM SERPL QL IA: NORMAL
HBV CORE IGM SERPL QL IA: NORMAL
HBV SURFACE AG SERPL QL IA: NORMAL
HCV AB SERPL QL IA: NORMAL
HIV 1+2 AB+HIV1 P24 AG SERPL QL IA: NORMAL

## 2023-09-23 PROCEDURE — 81514 NFCT DS BV&VAGINITIS DNA ALG: CPT | Performed by: FAMILY MEDICINE

## 2023-09-23 PROCEDURE — 87491 CHLMYD TRACH DNA AMP PROBE: CPT | Performed by: FAMILY MEDICINE

## 2023-09-23 PROCEDURE — 87389 HIV-1 AG W/HIV-1&-2 AB AG IA: CPT | Performed by: FAMILY MEDICINE

## 2023-09-23 PROCEDURE — 80074 ACUTE HEPATITIS PANEL: CPT | Performed by: FAMILY MEDICINE

## 2023-09-23 PROCEDURE — 86592 SYPHILIS TEST NON-TREP QUAL: CPT | Performed by: FAMILY MEDICINE

## 2023-09-23 PROCEDURE — 99213 OFFICE O/P EST LOW 20 MIN: CPT | Mod: S$GLB,,, | Performed by: FAMILY MEDICINE

## 2023-09-23 PROCEDURE — 36415 COLL VENOUS BLD VENIPUNCTURE: CPT | Performed by: FAMILY MEDICINE

## 2023-09-23 PROCEDURE — 99213 PR OFFICE/OUTPT VISIT, EST, LEVL III, 20-29 MIN: ICD-10-PCS | Mod: S$GLB,,, | Performed by: FAMILY MEDICINE

## 2023-09-23 PROCEDURE — 87591 N.GONORRHOEAE DNA AMP PROB: CPT | Performed by: FAMILY MEDICINE

## 2023-09-23 NOTE — PROGRESS NOTES
"Subjective:      Patient ID: Teresa Trujillo is a 58 y.o. female.    Vitals:  height is 5' 9" (1.753 m) and weight is 61.2 kg (135 lb). Her temperature is 98.6 °F (37 °C). Her blood pressure is 135/86 and her pulse is 74. Her respiration is 19 and oxygen saturation is 98%.     Chief Complaint: Exposure to STD    Exposure to STD   The patient's pertinent negatives include no discharge, dyspareunia, genital itching, genital lesions, genital rash or pelvic pain. Primary symptoms comment: no symptoms. This is a new problem. The current episode started in the past 7 days. The problem has been gradually worsening.       Genitourinary:  Negative for pelvic pain.      Objective:     Vitals:    09/23/23 1245   BP: 135/86   BP Location: Left arm   Patient Position: Sitting   BP Method: Medium (Automatic)   Pulse: 74   Resp: 19   Temp: 98.6 °F (37 °C)   SpO2: 98%   Weight: 61.2 kg (135 lb)   Height: 5' 9" (1.753 m)      Physical Exam   Constitutional: She is oriented to person, place, and time.  Non-toxic appearance. She does not appear ill. No distress.   HENT:   Head: Atraumatic.   Eyes: Conjunctivae are normal.   Cardiovascular: Normal rate, regular rhythm, normal heart sounds and normal pulses.   Pulmonary/Chest: Effort normal and breath sounds normal.   Abdominal: Bowel sounds are normal. Soft. There is no rebound.   Genitourinary:         Comments: deferred     Neurological: She is alert and oriented to person, place, and time.   Skin: Skin is not diaphoretic.   Psychiatric: Judgment normal.      Comments: tearful       Assessment:     1. Screening examination for STD (sexually transmitted disease)        Plan:       Screening examination for STD (sexually transmitted disease)  59 yo female reports partner with unknown STD, currently asymptomatic and would like to be tested.  -     C. trachomatis/N. gonorrhoeae by AMP DNA [facility]; Urine  -     Hepatitis Panel, Acute  -     HIV 1/2 Ag/Ab (4th Gen)  -     RPR  -     " Vaginosis Screen by DNA Probe    Patient Instructions   No treatment prior to knowledge of results; patient in agreement- you will be contacted with results and treated accordingly for any positive test. Herpes is a clinical diagnosis; we do not screen for herpes through blood in an asymptomatic individual. Fortunately you have no symptoms.

## 2023-09-23 NOTE — PATIENT INSTRUCTIONS
No treatment prior to knowledge of results; patient in agreement- you will be contacted with results and treated accordingly for any positive test. Herpes is a clinical diagnosis; we do not screen for herpes through blood in an asymptomatic individual. Fortunately you have no symptoms.

## 2023-09-24 LAB
BACTERIAL VAGINOSIS DNA: NEGATIVE
CANDIDA GLABRATA DNA: NEGATIVE
CANDIDA KRUSEI DNA: NEGATIVE
CANDIDA RRNA VAG QL PROBE: NEGATIVE
T VAGINALIS RRNA GENITAL QL PROBE: NEGATIVE

## 2023-09-25 ENCOUNTER — TELEPHONE (OUTPATIENT)
Dept: URGENT CARE | Facility: CLINIC | Age: 58
End: 2023-09-25
Payer: COMMERCIAL

## 2023-09-25 LAB
C TRACH DNA SPEC QL NAA+PROBE: NOT DETECTED
N GONORRHOEA DNA SPEC QL NAA+PROBE: NOT DETECTED
RPR SER QL: NORMAL

## 2023-09-27 ENCOUNTER — TELEPHONE (OUTPATIENT)
Dept: URGENT CARE | Facility: CLINIC | Age: 58
End: 2023-09-27
Payer: COMMERCIAL

## 2023-09-27 ENCOUNTER — CLINICAL SUPPORT (OUTPATIENT)
Dept: REHABILITATION | Facility: HOSPITAL | Age: 58
End: 2023-09-27
Payer: COMMERCIAL

## 2023-09-27 DIAGNOSIS — F43.9 STRESS: Primary | ICD-10-CM

## 2023-09-27 DIAGNOSIS — F43.29 STRESS AND ADJUSTMENT REACTION: ICD-10-CM

## 2023-09-27 DIAGNOSIS — R53.81 PHYSICAL DECONDITIONING: ICD-10-CM

## 2023-09-27 PROCEDURE — 97535 SELF CARE MNGMENT TRAINING: CPT

## 2023-09-27 PROCEDURE — 97112 NEUROMUSCULAR REEDUCATION: CPT

## 2023-09-27 PROCEDURE — 97110 THERAPEUTIC EXERCISES: CPT

## 2023-09-27 NOTE — PROGRESS NOTES
[facility]  Occupational Therapy  Treatment Note - Therapeutic Yoga Progam    Date: 9/27/2023  Name: Teresa Trujillo  Clinic Number: [id number]    Therapy Diagnosis:   Encounter Diagnoses   Name Primary?    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  I am very stressed.  She was compliant with home exercise program given last session.   Response to previous treatment: I   Functional change:  I am able to calm myself more and more.    Pain: 0/10      OBJECTIVE     Objective Measures updated at progress report unless specified.    Patient Specific Functional Scale:           Activity 5/24/23 8/9/23 9/6/23       anxiety 2       3    4       2.exercising 3        4     5       3. Pacing myself 3        3      4       4.             5.             6.             SCORE       2.6       3.3    4.3          Total Score = Sum of activity scores / number of activities  Minimum Detectable Change (90% CII) for average score = 2 points  Minimum Detectable Change (90% CI) for single activity score = 3 points         Treatment     Teresa received the treatments listed below:       Date 7/21/23 8/9/23 8/16/23 8/23/23 8/30/23 9/6/23 9/19/23 9/27/23   Therapeutic Yoga Exercises / Neuromuscular Re-education 45 minutes  45 minutes  PN  30 minutes  30 minutes  30 minutes  30 minutes  PN 30 minutes 45 minutes   Seated Yoga   chair yoga:  Cat-Cow,forward bend, back bend,  twist,   Hero on 2 blocks,  Floor: Seated twist, alignment sitting on bolster and block in cross legged for breathing practice. Floor: Seated twist, alignment sitting on bolster in heroes pose  for breathing practice. On bolster: bilateral  hamstring stretch with belt at feet, heroes for breathing practice, On bolster: bilateral  hamstring stretch with belt at feet, heroes for breathing practice, heroes for breathing practice, On floor:  twist, bound angle x 2,    Quadruped Cat-cow, puppy,  Cat-cow, puppy,  Puppy flow Puppy flow Puppy flow Plank x 3, low lunge x 2, Puppy flow ,passive backbend with bolster and chair.   Supine Twist x 3, bridge x 2, twist x 3, bridge x 2, twist x 3, bridge x 2, modified boat, happy baby flow  wist x 3, bridge x 2, modified boat, happy baby flow  twist x 3, bridge x 2, modified boat, happy baby flow  wist x 3, bridge x 2, modified boat, happy baby flow  twist x 3, bridge x 2, modified boat, happy baby flow     Prone Sphinx and sphinx plank x 3 Sphinx and sphinx plank x 3 Sphinx and sphinx plank x 2 Sphinx and sphinx plank x 2 Chapin pose with bolster and blankets x 2   Sphinx and sphinx plank x 2 Plank,   standing Down dog with chair, chair pose/volcano with block x 2, shoulder ROM, warrior 2, triangle, side angle with chair, wide straddle forward fold down dog with chair, chair pose/volcano with block x 2, shoulder ROM, warrior 2, triangle, side angle, wide straddle forward fold down dog with chair, chair pose/volcano with block x 2, shoulder ROM, warrior 2, triangle, side angle, wide straddle forward fold down dog with chair, chair pose/volcano with block x 2, shoulder ROM, warrior 2, triangle, side angle, wide straddle forward fold Tree pose x 2 with modifications, warrior 2, side angle, chair/volcano x 2, Down dog x 3 and Ddog  with chair, warrior 2, side angle, chair/volcano x 2, bilateral shoulder retraction with green theraband Down dog x 3 and Ddog  with chair, warrior 2, side  angle, chair/volcano x 2, Down dog x 3,  warrior 2, triangle, shoulder release with belt and block, standing sun salutation up to lunge.                           Self-Care/Home Management  / Therapeutic Activities  15 minutes  15 minutes  15 minutes  15 minutes  15 minutes  15 minutes 15 minutes 15  minutes              Relaxation techniques Singlw nostril breath, body scan Single nostril breath, body scan DB, Single nostril breath, body scan DB, Single nostril breath, body scan DB, Single nostril breath, body scan DB, Single nostril breath, body scan DB, Single nostril breath, body scan, DB, Single nostril breath, body scan,   Restorative  Fish and bridge with blocks Fish and bridge with blocks Fish and bridge with blocks Supine with calves on chair Bridge at wall with 2 bolsters, twist on bolster Supine with calves on chair and bolster under hips Supine with bolster under knees upine with bolster under knees   Activity Pacing                                 Stress Management/Education   - physiology of yoga/meditation and immune health  - physiology of yoga/meditation and immune health   - physiology of yoga/meditation and immune health  - physiology of yoga/meditation and immune health                     Patient Education and Home Exercises      Education provided:   - - physiology of yoga/meditation and immune health   - Progress towards goals     Written Home Exercises Provided: yes.  Exercises were reviewed and Teresa was able to demonstrate them prior to the end of the session.  Teresa demonstrated good  understanding of the HEP provided. See EMR under Patient Instructions for exercises provided during therapy sessions.       Assessment     In today's session, Teresa reviewed her HEP that included standing, supine, quadruped and prone yoga poses.   We practiced  body scan, restorative yoga with blocks, diaphragmatic breathing and single nostril breathing to increase breath capacity. She demonstrated a good  understanding of quieting thoughts by using awareness of sensation in the body for relaxation for immune health.  Teresa tolerated this well and required less assist for verbal and neuromuscular cues. She continues  attending the yoga class held in the [facility].     PROGRESS UPDATE:Teresa is progressing well towards her goals, see updates to goals.  She has only had 6 sessions thus far and PSFS score change  from 2.6 to 4.3. This indicated improvement in her anxiety, daily exercise compliant and pacing.She is consistently attending the yoga class here with her caregiver,(). We  identified relaxation apps to assist  Pt prognosis is Good. Pt will continue to benefit from skilled outpatient occupational therapy to address the deficits listed in the problem list on initial evaluation provide pt/family education and to maximize pt's level of independence in the home and community environment. Pt's spiritual, cultural and educational needs considered and pt agreeable to plan of care and goals.    Anticipated barriers to occupational therapy: none    Goals:  Short Term Goals: 6 weeks      Goal # Goal Status   1 Patient will demonstrate independence with diaphragmatic breathing in sit and supine. met   2 Pt. will identify resource for audio body scan to assist with stress management/immune health met   3 Patient will identify 2 new stress coping skills for stress management/immune health. Progressing   4 Patient will identify activity pacing problems.  Patient will then implement new plan for daily activity to increase endurance for ADL's. Progressing      Long Term Goals: 12 weeks      Goal # Goal Status   1 Patient will demonstrate independence with yoga Home Exercise Program to increase strength and endurance for ADL's. Progressing   2 Patient will demonstrate independence with relaxation techniques to manage stress for immune health. Progressing   3 Patient will verbalize good understanding of  stress/immune health relationship.  Progressing   4             PLAN     Plan of care Certification: 9/27/2023 to 9/24/23.    Outpatient Occupational Therapy 1 times weekly for 12 weeks to include the following interventions: Neuromuscular Re-ed, Patient Education, Self Care, Therapeutic Activities, and Therapeutic Exercise.     Hilton Palacios, OT

## 2023-10-24 ENCOUNTER — OFFICE VISIT (OUTPATIENT)
Dept: PSYCHIATRY | Facility: CLINIC | Age: 58
End: 2023-10-24
Payer: COMMERCIAL

## 2023-10-24 VITALS
WEIGHT: 152 LBS | DIASTOLIC BLOOD PRESSURE: 84 MMHG | BODY MASS INDEX: 22.45 KG/M2 | SYSTOLIC BLOOD PRESSURE: 135 MMHG | HEART RATE: 93 BPM

## 2023-10-24 DIAGNOSIS — F41.9 ANXIETY: ICD-10-CM

## 2023-10-24 DIAGNOSIS — F33.0 MAJOR DEPRESSIVE DISORDER, RECURRENT, MILD: ICD-10-CM

## 2023-10-24 PROCEDURE — 99214 PR OFFICE/OUTPT VISIT, EST, LEVL IV, 30-39 MIN: ICD-10-PCS | Mod: S$GLB,,, | Performed by: NURSE PRACTITIONER

## 2023-10-24 PROCEDURE — 1160F RVW MEDS BY RX/DR IN RCRD: CPT | Mod: CPTII,S$GLB,, | Performed by: NURSE PRACTITIONER

## 2023-10-24 PROCEDURE — 3044F PR MOST RECENT HEMOGLOBIN A1C LEVEL <7.0%: ICD-10-PCS | Mod: CPTII,S$GLB,, | Performed by: NURSE PRACTITIONER

## 2023-10-24 PROCEDURE — 3044F HG A1C LEVEL LT 7.0%: CPT | Mod: CPTII,S$GLB,, | Performed by: NURSE PRACTITIONER

## 2023-10-24 PROCEDURE — 99999 PR PBB SHADOW E&M-EST. PATIENT-LVL III: ICD-10-PCS | Mod: PBBFAC,,, | Performed by: NURSE PRACTITIONER

## 2023-10-24 PROCEDURE — 3079F PR MOST RECENT DIASTOLIC BLOOD PRESSURE 80-89 MM HG: ICD-10-PCS | Mod: CPTII,S$GLB,, | Performed by: NURSE PRACTITIONER

## 2023-10-24 PROCEDURE — 3008F PR BODY MASS INDEX (BMI) DOCUMENTED: ICD-10-PCS | Mod: CPTII,S$GLB,, | Performed by: NURSE PRACTITIONER

## 2023-10-24 PROCEDURE — 99214 OFFICE O/P EST MOD 30 MIN: CPT | Mod: S$GLB,,, | Performed by: NURSE PRACTITIONER

## 2023-10-24 PROCEDURE — 1159F MED LIST DOCD IN RCRD: CPT | Mod: CPTII,S$GLB,, | Performed by: NURSE PRACTITIONER

## 2023-10-24 PROCEDURE — 1160F PR REVIEW ALL MEDS BY PRESCRIBER/CLIN PHARMACIST DOCUMENTED: ICD-10-PCS | Mod: CPTII,S$GLB,, | Performed by: NURSE PRACTITIONER

## 2023-10-24 PROCEDURE — 1159F PR MEDICATION LIST DOCUMENTED IN MEDICAL RECORD: ICD-10-PCS | Mod: CPTII,S$GLB,, | Performed by: NURSE PRACTITIONER

## 2023-10-24 PROCEDURE — 99999 PR PBB SHADOW E&M-EST. PATIENT-LVL III: CPT | Mod: PBBFAC,,, | Performed by: NURSE PRACTITIONER

## 2023-10-24 PROCEDURE — 3008F BODY MASS INDEX DOCD: CPT | Mod: CPTII,S$GLB,, | Performed by: NURSE PRACTITIONER

## 2023-10-24 PROCEDURE — 4010F ACE/ARB THERAPY RXD/TAKEN: CPT | Mod: CPTII,S$GLB,, | Performed by: NURSE PRACTITIONER

## 2023-10-24 PROCEDURE — 3079F DIAST BP 80-89 MM HG: CPT | Mod: CPTII,S$GLB,, | Performed by: NURSE PRACTITIONER

## 2023-10-24 PROCEDURE — 4010F PR ACE/ARB THEARPY RXD/TAKEN: ICD-10-PCS | Mod: CPTII,S$GLB,, | Performed by: NURSE PRACTITIONER

## 2023-10-24 PROCEDURE — 3075F PR MOST RECENT SYSTOLIC BLOOD PRESS GE 130-139MM HG: ICD-10-PCS | Mod: CPTII,S$GLB,, | Performed by: NURSE PRACTITIONER

## 2023-10-24 PROCEDURE — 3075F SYST BP GE 130 - 139MM HG: CPT | Mod: CPTII,S$GLB,, | Performed by: NURSE PRACTITIONER

## 2023-10-24 RX ORDER — VENLAFAXINE HYDROCHLORIDE 150 MG/1
150 CAPSULE, EXTENDED RELEASE ORAL DAILY
Qty: 90 CAPSULE | Refills: 3 | Status: SHIPPED | OUTPATIENT
Start: 2023-10-24

## 2023-10-24 NOTE — PROGRESS NOTES
Outpatient Psychiatry Follow-Up Visit (MD/NP)    10/24/2023    Clinical Status of Patient:  Outpatient (Ambulatory)    Chief Complaint:  Teresa Trujillo is a 58 y.o. female who presents today for follow-up of depression and anxiety.  Met with patient.      Last visit was: 5/15/23. Chart and  reviewed.     Interval History and Content of Current Session:  Current Psychiatric Medications/changes  Start Effexor XR 37.5 mg daily x 1 week then increase to 75 mg daily  Follow-up in 3 months    Reports feeling 80 percent better.  Reports she still has some problems with her mood and anxiety. Started with a therapist,Sarai Deleon Phd.  Also reports marital problems are improving.   Affect appears bright with euthymic mood. Thought processes are linear, clear, and organized. Denies SI/HI/AVH.     Psychotherapy:  Target symptoms: depression, anxiety   Why chosen therapy is appropriate versus another modality: relevant to diagnosis  Outcome monitoring methods: self-report  Therapeutic intervention type: insight oriented psychotherapy  Topics discussed/themes: building skills sets for symptom management, symptom recognition  The patient's response to the intervention is accepting. The patient's progress toward treatment goals is good.   Duration of intervention: 15 minutes.    Review of Systems   PSYCHIATRIC: Pertinant items are noted in the narrative.  CONSTITUTIONAL: No weight gain or loss.   MUSCULOSKELETAL: No pain or stiffness of the joints.  NEUROLOGIC: No weakness, sensory changes, seizures, confusion, memory loss, tremor or other abnormal movements.  ENDOCRINE: No polydipsia or polyuria.  INTEGUMENTARY: No rashes or lacerations.  EYES: No exophthalmos, jaundice or blindness.  ENT: No dizziness, tinnitus or hearing loss.  RESPIRATORY: No shortness of breath.  CARDIOVASCULAR: No tachycardia or chest pain.  GASTROINTESTINAL: No nausea, vomiting, pain, constipation or diarrhea.  GENITOURINARY: No frequency, dysuria  or sexual dysfunction.  HEMATOLOGIC/LYMPHATIC: No excessive bleeding, prolonged or excessive bleeding after dental extraction/injury.  ALLERGIC/IMMUNOLOGIC: No allergic response to materials, foods or animals at this time.    Past Medical, Family and Social History: The patient's past medical, family and social history have been reviewed and updated as appropriate within the electronic medical record - see encounter notes.    Compliance: yes    Side effects: None    Risk Parameters:  Patient reports no suicidal ideation  Patient reports no homicidal ideation  Patient reports no self-injurious behavior  Patient reports no violent behavior    Exam (detailed: at least 9 elements; comprehensive: all 15 elements)   Constitutional  Vitals:  Most recent vital signs, dated greater than 90 days prior to this appointment, were reviewed.   Vitals:    10/24/23 0937   BP: 135/84   Pulse: 93   Weight: 68.9 kg (152 lb 0.1 oz)        General:  unremarkable, age appropriate     Musculoskeletal  Muscle Strength/Tone:  not examined   Gait & Station:  non-ataxic     Psychiatric  Speech:  no latency; no press   Mood & Affect:  steady  congruent and appropriate   Thought Process:  normal and logical   Associations:  intact   Thought Content:  normal, no suicidality, no homicidality, delusions, or paranoia   Insight:  intact   Judgement: behavior is adequate to circumstances   Orientation:  grossly intact   Memory: intact for content of interview   Language: grossly intact   Attention Span & Concentration:  able to focus   Fund of Knowledge:  intact and appropriate to age and level of education     Assessment and Diagnosis   Status/Progress: Based on the examination today, the patient's problem(s) is/are improved and adequately but not ideally controlled.  New problems have not been presented today.   Co-morbidities and Lack of compliance are not complicating management of the primary condition.  There are no active rule-out diagnoses for  this patient at this time.     General Impression:       ICD-10-CM ICD-9-CM   1. Anxiety  F41.9 300.00   2. Major depressive disorder, recurrent, mild  F33.0 296.31       Intervention/Counseling/Treatment Plan   Medication Management: The risks and benefits of medication were discussed with the patient.  Increase to Effexor  mg daily    Return to Clinic: 6 months    Risks, benefits, side effects and alternative treatments discussed with patient. Patient agrees with the current plan as documented.  Encouraged Patient to keep future appointments.  Take medications as prescribed and abstain from substance abuse.  Pt to present to ED for thoughts to harm herself or others

## 2023-11-08 ENCOUNTER — CLINICAL SUPPORT (OUTPATIENT)
Dept: REHABILITATION | Facility: HOSPITAL | Age: 58
End: 2023-11-08
Payer: COMMERCIAL

## 2023-11-08 DIAGNOSIS — R53.81 PHYSICAL DECONDITIONING: ICD-10-CM

## 2023-11-08 DIAGNOSIS — F43.29 STRESS AND ADJUSTMENT REACTION: ICD-10-CM

## 2023-11-08 DIAGNOSIS — F43.9 STRESS: Primary | ICD-10-CM

## 2023-11-08 PROCEDURE — 97535 SELF CARE MNGMENT TRAINING: CPT

## 2023-11-08 PROCEDURE — 97110 THERAPEUTIC EXERCISES: CPT

## 2023-11-08 PROCEDURE — 97112 NEUROMUSCULAR REEDUCATION: CPT

## 2023-11-08 NOTE — PROGRESS NOTES
[facility]  Occupational Therapy Progress and  Treatment Note - Therapeutic Yoga Progam    Date: 11/8/2023  Name: Teresa Trujillo  Clinic Number: [id number]    Therapy Diagnosis:   Encounter Diagnoses   Name Primary?    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  I have been practicing at home.  She was compliant with home exercise program given last session.   Response to previous treatment: I   Functional change:  I am able to calm myself more and more.    Pain: 0/10      OBJECTIVE     Objective Measures updated at progress report unless specified.    Patient Specific Functional Scale:           Activity 5/24/23 8/9/23 9/6/23 11/8/23     anxiety 2       3    4      5     2.exercising 3        4     5       5     3. Pacing myself 3        3      4      5     4.             5.             6.             SCORE       2.6       3.3    4.3  5.0        Total Score = Sum of activity scores / number of activities  Minimum Detectable Change (90% CII) for average score = 2 points  Minimum Detectable Change (90% CI) for single activity score = 3 points         Treatment     Teresa received the treatments listed below:       Date 9/6/23 9/19/23 9/27/23 11/8/23   Therapeutic Yoga Exercises / Neuromuscular Re-education  30 minutes  PN 30 minutes 45 minutes 30 minutes  PN, POC   Seated Yoga   On bolster: bilateral  hamstring stretch with belt at feet, heroes  for breathing practice, heroes for breathing practice, On floor:  twist, bound angle x 2,  Twist and forward fold in chair   Quadruped Plank x 3, low lunge x 2, Puppy flow ,passive backbend with bolster and chair. Low lunge with chair,   Supine wist x 3, bridge x 2, modified boat, happy baby flow  twist x 3, bridge x 2, modified boat, happy baby flow   wist x 3, bridge x 2, modified boat, happy baby flow    Prone  Sphinx and sphinx plank x 2 Plank, Sphinx and sphinx plank x 2   standing Down dog x 3 and Ddog  with chair, warrior 2, side angle, chair/volcano x 2, bilateral shoulder retraction with green theraband Down dog x 3 and Ddog  with chair, warrior 2, side angle, chair/volcano x 2, Down dog x 3,  warrior 2, triangle, shoulder release with belt and block, standing sun salutation up to lunge.   Backbend with chair, tree pose, warrior 1, chair pose, volcano, down dog, plank                 Self-Care/Home Management  / Therapeutic Activities  15 minutes 15 minutes 15  minutes 15 minutes          Relaxation techniques DB, Single nostril breath, body scan DB, Single nostril breath, body scan, DB, Single nostril breath, body scan, DB, body scan,    Restorative  Supine with calves on chair and bolster under hips Supine with bolster under knees upine with bolster under knees Supine with calves on chair and bolster under hips   Activity Pacing    Discussed including breaks in her day for brief rest to avoid exhaustion and fatigue which lead to stress.                  Stress Management/Education  - physiology of yoga/meditation and immune health    - physiology of yoga/meditation and immune health              Patient Education and Home Exercises      Education provided:   - - physiology of yoga/meditation and immune health   - Progress towards goals     Written Home Exercises Provided: yes.  Exercises were reviewed and Teresa was able to demonstrate them prior to the end of the session.  Teresa demonstrated good   understanding of the HEP provided. See EMR under Patient Instructions for exercises provided during therapy sessions.       Assessment     In today's session, Teresa reviewed her HEP that included standing, supine, quadruped and prone yoga poses.   We practiced  body scan, restorative yoga with blocks, diaphragmatic breathing to increase breath capacity. She demonstrated a good understanding of quieting thoughts by using awareness of sensation in the body for relaxation for immune health.  Teresa tolerated this well and required less assist for verbal and neuromuscular cues. She continues  attending the yoga class held in the [facility].     PROGRESS UPDATE:Teresa is progressing well towards her goals, see updates to goals.  Her PSFS score increased  from 4.3 to 5.0. This indicated improvement in her anxiety and activity pacing.  She has been practicing her HEP as recommended. She is consistently attending the yoga class here with her caregiver,(). We  identified relaxation apps to assist  Pt prognosis is Good. Pt will continue to benefit from skilled outpatient occupational therapy to address the deficits listed in the problem list on initial evaluation provide pt/family education and to maximize pt's level of independence in the home and community environment. Pt's spiritual, cultural and educational needs considered and pt agreeable to plan of care and goals.    Anticipated barriers to occupational therapy: none    Goals:  Short Term Goals: 6 weeks      Goal # Goal Status   1 Patient will demonstrate independence with diaphragmatic breathing in sit and supine. met   2 Pt. will identify resource for audio body scan to assist with stress management/immune health met   3 Patient will identify 2 new stress coping skills for stress management/immune health. Progressing   4 Patient will identify activity pacing problems.  Patient will then implement new plan for daily activity to increase endurance for  ADL's. met      Long Term Goals: 12 weeks      Goal # Goal Status   1 Patient will demonstrate independence with yoga Home Exercise Program to increase strength and endurance for ADL's. Progressing   2 Patient will demonstrate independence with relaxation techniques to manage stress for immune health. Progressing   3 Patient will verbalize good understanding of stress/immune health relationship.  Progressing   4             PLAN     Plan of care Certification: 11/8/2023 to 3/8/24.    Outpatient Occupational Therapy 1 times weekly for 12 weeks to include the following interventions: Neuromuscular Re-ed, Patient Education, Self Care, Therapeutic Activities, and Therapeutic Exercise.     Hilton Palacios, OT

## 2023-11-10 ENCOUNTER — OFFICE VISIT (OUTPATIENT)
Dept: URGENT CARE | Facility: CLINIC | Age: 58
End: 2023-11-10
Payer: COMMERCIAL

## 2023-11-10 VITALS
WEIGHT: 152 LBS | BODY MASS INDEX: 22.51 KG/M2 | OXYGEN SATURATION: 98 % | HEART RATE: 90 BPM | TEMPERATURE: 98 F | HEIGHT: 69 IN | DIASTOLIC BLOOD PRESSURE: 82 MMHG | SYSTOLIC BLOOD PRESSURE: 134 MMHG | RESPIRATION RATE: 19 BRPM

## 2023-11-10 DIAGNOSIS — M25.519 SHOULDER PAIN, UNSPECIFIED CHRONICITY, UNSPECIFIED LATERALITY: Primary | ICD-10-CM

## 2023-11-10 LAB
CTP QC/QA: YES
SARS-COV-2 AG RESP QL IA.RAPID: NEGATIVE

## 2023-11-10 PROCEDURE — 99214 OFFICE O/P EST MOD 30 MIN: CPT | Mod: S$GLB,,, | Performed by: NURSE PRACTITIONER

## 2023-11-10 PROCEDURE — 87811 SARS CORONAVIRUS 2 ANTIGEN POCT, MANUAL READ: ICD-10-PCS | Mod: QW,S$GLB,, | Performed by: NURSE PRACTITIONER

## 2023-11-10 PROCEDURE — 99214 PR OFFICE/OUTPT VISIT, EST, LEVL IV, 30-39 MIN: ICD-10-PCS | Mod: S$GLB,,, | Performed by: NURSE PRACTITIONER

## 2023-11-10 PROCEDURE — 87811 SARS-COV-2 COVID19 W/OPTIC: CPT | Mod: QW,S$GLB,, | Performed by: NURSE PRACTITIONER

## 2023-11-10 RX ORDER — DICLOFENAC SODIUM 50 MG/1
50 TABLET, DELAYED RELEASE ORAL 2 TIMES DAILY
Qty: 30 TABLET | Refills: 0 | Status: SHIPPED | OUTPATIENT
Start: 2023-11-10

## 2023-11-10 RX ORDER — BACLOFEN 20 MG/1
20 TABLET ORAL 3 TIMES DAILY PRN
Qty: 30 TABLET | Refills: 0 | Status: SHIPPED | OUTPATIENT
Start: 2023-11-10 | End: 2024-11-09

## 2023-11-10 RX ORDER — FAMOTIDINE 40 MG/1
40 TABLET, FILM COATED ORAL NIGHTLY
COMMUNITY
Start: 2023-11-02

## 2023-11-10 RX ORDER — DICLOFENAC SODIUM 50 MG/1
50 TABLET, DELAYED RELEASE ORAL 2 TIMES DAILY
Qty: 30 TABLET | Refills: 0 | Status: SHIPPED | OUTPATIENT
Start: 2023-11-10 | End: 2023-11-10

## 2023-11-10 NOTE — PROGRESS NOTES
"Subjective:      Patient ID: Teresa Trujillo is a 58 y.o. female.    Vitals:  height is 5' 9" (1.753 m) and weight is 68.9 kg (152 lb). Her oral temperature is 97.6 °F (36.4 °C). Her blood pressure is 134/82 and her pulse is 90. Her respiration is 19 and oxygen saturation is 98%.     Chief Complaint: Shoulder Pain and Abdominal Pain    Shoulder Pain   The pain is present in the left shoulder. This is a new problem. The current episode started in the past 7 days (STARTED ON MONDAY). There has been no history of extremity trauma. The problem occurs constantly. The problem has been gradually worsening. The pain is at a severity of 6/10. The pain is mild. Pertinent negatives include no fever, headaches, inability to bear weight, itching, joint locking, joint swelling, limited range of motion, numbness, tingling or visual symptoms. The symptoms are aggravated by activity (SLEEPING ON THAT SIDE). She has tried NSAIDS for the symptoms. The treatment provided mild relief. Family history includes arthritis. LT KNEE There is no history of diabetes.       Constitution: Negative for fever.   HENT: Negative.     Respiratory: Negative.     Gastrointestinal: Negative.    Musculoskeletal:  Positive for pain. Negative for trauma and abnormal ROM of joint.   Skin:  Negative for erythema.   Neurological:  Negative for headaches and numbness.      Objective:     Physical Exam   HENT:   Head: Normocephalic.   Cardiovascular: Normal rate.   Pulmonary/Chest: Effort normal and breath sounds normal.   Abdominal: Normal appearance.   Musculoskeletal: Normal range of motion.         General: No swelling, tenderness or deformity. Normal range of motion.   Neurological: She is alert.   Skin: Skin is warm, dry and no rash. No erythema       Assessment:     1. Shoulder pain, unspecified chronicity, unspecified laterality        Plan:       Shoulder pain, unspecified chronicity, unspecified laterality  -     SARS Coronavirus 2 Antigen, POCT " Manual Read  -     Discontinue: diclofenac (VOLTAREN) 50 MG EC tablet; Take 1 tablet (50 mg total) by mouth 2 (two) times daily.  Dispense: 30 tablet; Refill: 0  -     diclofenac (VOLTAREN) 50 MG EC tablet; Take 1 tablet (50 mg total) by mouth 2 (two) times daily.  Dispense: 30 tablet; Refill: 0  -     baclofen (LIORESAL) 20 MG tablet; Take 1 tablet (20 mg total) by mouth 3 (three) times daily as needed.  Dispense: 30 tablet; Refill: 0      Results for orders placed or performed in visit on 11/10/23   SARS Coronavirus 2 Antigen, POCT Manual Read   Result Value Ref Range    SARS Coronavirus 2 Antigen Negative Negative     Acceptable Yes      Patient Instructions   Treating shoulder pain often involves soothing inflammation (swelling and redness) and strengthening muscles. Here are several ways you can take care of yourself and relieve shoulder pain.    Anti-inflammatory medication  Nonsteroidal anti-inflammatory medications (NSAIDS) help to relieve pain and lower inflammation. Over-the-counter drugs include aspirin, ibuprofen, and naproxen. Reducing inflammation is important in rotator cuff injuries, tendonitis and arthritis, and other shoulder injuries.    Cold compress  Cold compresses can help reduce swelling in the shoulder. Cooling also helps to numb sharp pain. Apply an ice pack for up to 20 minutes, up to five times a day. Use a frozen gel pack, ice cubes in a plastic bag, or a bag of frozen peas. Wrap the cold pack in a soft towel. Do not apply a cold pack directly to skin.    Compression  Wrap the shoulder with an elastic medical bandage to help reduce swelling and pain. Use a cold compression bandage or a regular ACE bandage. You can also get a shoulder wrap from a pharmacy. Wrap it snugly but not too tight. You dont want to block blood flow. If your arm or hand begins to feel numb or tingly, or turn blue, loosen the compression bandage.    Heat therapy  Heat helps to relax tense muscles and  soothe a stiff shoulder. It can help with muscle pain and arthritis in the shoulder. Use a heated gel pack, heating pad or a hot water bottle.    Muscle relaxants  Muscle relaxants may help treat pain if you have muscle tension or spasms around the shoulder joint. Common muscle relaxants include cyclobenzaprine, tizanidine, and baclofen. You will need a prescription from your doctor.    Remember that muscle relaxants cause drowsiness and shouldnt be taken if youre driving or operating machinery.

## 2023-11-10 NOTE — PLAN OF CARE
[facility]  Occupational Therapy Plan of Care Note     Name: Teresa Trujillo  Clinic Number: [id number]    Therapy Diagnosis:   Encounter Diagnoses   Name Primary?    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  I have been practicing at home.  She was compliant with home exercise program given last session.   Response to previous treatment: I   Functional change:  I am able to calm myself more and more.   ate:     Objective      Update: Patient Specific Functional Scale:           Activity 5/24/23 8/9/23 9/6/23 11/8/23     anxiety 2       3    4      5     2.exercising 3        4     5       5     3. Pacing myself 3        3      4      5     4.             5.             6.             SCORE       2.6       3.3    4.3  5.0        Total Score = Sum of activity scores / number of activities  Minimum Detectable Change (90% CII) for average score = 2 points  Minimum Detectable Change (90% CI) for single activity score = 3 points    Assessment     Update:PROGRESS UPDATE:Teresa is progressing well towards her goals, see updates to goals.  Her PSFS score increased  from 4.3 to 5.0. This indicated improvement in her anxiety and activity pacing.  She has been practicing her HEP as recommended. She is consistently attending the yoga class here with her caregiver,(). We  identified relaxation apps to assist  Pt prognosis is Good. Pt will continue to benefit from skilled  outpatient occupational therapy to address the deficits listed in the problem list on initial evaluation provide pt/family education and to maximize pt's level of independence in the home and community environment. Pt's spiritual, cultural and educational needs considered and pt agreeable to plan of care and goals.       GOALS  Short Term Goals: 6 weeks      Goal # Goal Status   1 Patient will demonstrate independence with diaphragmatic breathing in sit and supine. met   2 Pt. will identify resource for audio body scan to assist with stress management/immune health met   3 Patient will identify 2 new stress coping skills for stress management/immune health. Progressing   4 Patient will identify activity pacing problems.  Patient will then implement new plan for daily activity to increase endurance for ADL's. met      Long Term Goals: 12 weeks      Goal # Goal Status   1 Patient will demonstrate independence with yoga Home Exercise Program to increase strength and endurance for ADL's. Progressing   2 Patient will demonstrate independence with relaxation techniques to manage stress for immune health. Progressing   3 Patient will verbalize good understanding of stress/immune health relationship.  Progressing   4               Plan     Updated Certification Period: 11/08/23 to 03/08/24   Recommended Treatment Plan: 1 times per week for 12 weeks:  Neuromuscular Re-ed, Patient Education, Self Care, Therapeutic Activities, and Therapeutic Exercise      Hilton Palacios, OT

## 2023-11-10 NOTE — PATIENT INSTRUCTIONS
Treating shoulder pain often involves soothing inflammation (swelling and redness) and strengthening muscles. Here are several ways you can take care of yourself and relieve shoulder pain.    Anti-inflammatory medication  Nonsteroidal anti-inflammatory medications (NSAIDS) help to relieve pain and lower inflammation. Over-the-counter drugs include aspirin, ibuprofen, and naproxen. Reducing inflammation is important in rotator cuff injuries, tendonitis and arthritis, and other shoulder injuries.    Cold compress  Cold compresses can help reduce swelling in the shoulder. Cooling also helps to numb sharp pain. Apply an ice pack for up to 20 minutes, up to five times a day. Use a frozen gel pack, ice cubes in a plastic bag, or a bag of frozen peas. Wrap the cold pack in a soft towel. Do not apply a cold pack directly to skin.    Compression  Wrap the shoulder with an elastic medical bandage to help reduce swelling and pain. Use a cold compression bandage or a regular ACE bandage. You can also get a shoulder wrap from a pharmacy. Wrap it snugly but not too tight. You dont want to block blood flow. If your arm or hand begins to feel numb or tingly, or turn blue, loosen the compression bandage.    Heat therapy  Heat helps to relax tense muscles and soothe a stiff shoulder. It can help with muscle pain and arthritis in the shoulder. Use a heated gel pack, heating pad or a hot water bottle.    Muscle relaxants  Muscle relaxants may help treat pain if you have muscle tension or spasms around the shoulder joint. Common muscle relaxants include cyclobenzaprine, tizanidine, and baclofen. You will need a prescription from your doctor.    Remember that muscle relaxants cause drowsiness and shouldnt be taken if youre driving or operating machinery.  
16-Sep-2022 15:44

## 2023-11-29 ENCOUNTER — CLINICAL SUPPORT (OUTPATIENT)
Dept: REHABILITATION | Facility: HOSPITAL | Age: 58
End: 2023-11-29
Payer: COMMERCIAL

## 2023-11-29 DIAGNOSIS — F43.9 STRESS: Primary | ICD-10-CM

## 2023-11-29 DIAGNOSIS — F43.29 STRESS AND ADJUSTMENT REACTION: ICD-10-CM

## 2023-11-29 DIAGNOSIS — R53.81 PHYSICAL DECONDITIONING: ICD-10-CM

## 2023-11-29 PROCEDURE — 97110 THERAPEUTIC EXERCISES: CPT

## 2023-11-29 PROCEDURE — 97112 NEUROMUSCULAR REEDUCATION: CPT

## 2023-11-29 PROCEDURE — 97535 SELF CARE MNGMENT TRAINING: CPT

## 2023-11-29 NOTE — PROGRESS NOTES
[facility]  Occupational Therapy Progress and  Treatment Note - Therapeutic Yoga Progam    Date: 11/29/2023  Name: Teresa Trujillo  Clinic Number: [id number]    Therapy Diagnosis:   Encounter Diagnoses   Name Primary?    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  I have been practicing at home.  She was compliant with home exercise program given last session.   Response to previous treatment: felt very good.  Glad to have learned new things.   Functional change: I have more emotional stability    Pain: 0/10      OBJECTIVE     Objective Measures updated at progress report unless specified.    Patient Specific Functional Scale:           Activity 5/24/23 8/9/23 9/6/23 11/8/23     anxiety 2       3    4      5     2.exercising 3        4     5       5     3. Pacing myself 3        3      4      5     4.             5.             6.             SCORE       2.6       3.3    4.3  5.0        Total Score = Sum of activity scores / number of activities  Minimum Detectable Change (90% CII) for average score = 2 points  Minimum Detectable Change (90% CI) for single activity score = 3 points         Treatment     Teresa received the treatments listed below:       Date 9/6/23 9/19/23 9/27/23 11/8/23 11/29/23   Therapeutic Yoga Exercises / Neuromuscular Re-education  30 minutes  PN 30 minutes 45 minutes 30 minutes  PN, POC 30 minutes   Seated Yoga   On  bolster: bilateral  hamstring stretch with belt at feet, heroes for breathing practice, heroes for breathing practice, On floor:  twist, bound angle x 2,  Twist and forward fold in chair    Quadruped Plank x 3, low lunge x 2, Puppy flow ,passive backbend with bolster and chair. Low lunge with chair,    Supine wist x 3, bridge x 2, modified boat, happy baby flow  twist x 3, bridge x 2, modified boat, happy baby flow   wist x 3, bridge x 2, modified boat, happy baby flow     Prone  Sphinx and sphinx plank x 2 Plank, Sphinx and sphinx plank x 2    standing Down dog x 3 and Ddog  with chair, warrior 2, side angle, chair/volcano x 2, bilateral shoulder retraction with green theraband Down dog x 3 and Ddog  with chair, warrior 2, side angle, chair/volcano x 2, Down dog x 3,  warrior 2, triangle, shoulder release with belt and block, standing sun salutation up to lunge.   Backbend with chair, tree pose, warrior 1, chair pose, volcano, down dog, plank Sun salutations with low lunge, plank, down dog, cobra, warrior 2, side angle, forward fold with twist,                   Self-Care/Home Management  / Therapeutic Activities  15 minutes 15 minutes 15  minutes 15 minutes 15 minutes           Relaxation techniques DB, Single nostril breath, body scan DB, Single nostril breath, body scan, DB, Single nostril breath, body scan, DB, body scan,  DB, body scan,   Restorative  Supine with calves on chair and bolster under hips Supine with bolster under knees upine with bolster under knees Supine with calves on chair and bolster under hips    Activity Pacing    Discussed including breaks in her day for brief rest to avoid exhaustion and fatigue which lead to stress.  Reviewed including breaks in her day for brief rest to avoid exhaustion and fatigue which lead to stress.                    Stress Management/Education  - physiology of yoga/meditation and immune health    - physiology of yoga/meditation and immune health - physiology of  yoga/meditation and immune health               Patient Education and Home Exercises      Education provided:   - - physiology of yoga/meditation and immune health   - Progress towards goals     Written Home Exercises Provided: yes.  Exercises were reviewed and Teresa was able to demonstrate them prior to the end of the session.  Teresa demonstrated good  understanding of the HEP provided. See EMR under Patient Instructions for exercises provided during therapy sessions.       Assessment     In today's session, Teresa reviewed her HEP that included standing, supine, quadruped and prone yoga poses.   We practiced  body scan, restorative yoga with blocks, diaphragmatic breathing to increase breath capacity. She demonstrated a good understanding of quieting thoughts by using awareness of sensation in the body for relaxation for immune health.  Teresa tolerated this well and required less assist for verbal and neuromuscular cues. She continues  attending the yoga class held in the [facility].     PROGRESS UPDATE:Teresa is progressing well towards her goals, see updates to goals.  Her PSFS score increased  from 4.3 to 5.0. This indicated improvement in her anxiety and activity pacing.  She has been practicing her HEP as recommended. She is consistently attending the yoga class here with her caregiver,(). We  identified relaxation apps to assist  Pt prognosis is Good. Pt will continue to benefit from skilled outpatient occupational therapy to address the deficits listed in the problem list on initial evaluation provide pt/family education and to maximize pt's level of independence in the home and community environment. Pt's spiritual, cultural and educational needs considered and pt agreeable to plan of care and goals.    Anticipated barriers to occupational therapy: none    Goals:  Short Term Goals: 6 weeks      Goal # Goal Status   1 Patient will demonstrate independence with diaphragmatic breathing in  sit and supine. met   2 Pt. will identify resource for audio body scan to assist with stress management/immune health met   3 Patient will identify 2 new stress coping skills for stress management/immune health. Progressing   4 Patient will identify activity pacing problems.  Patient will then implement new plan for daily activity to increase endurance for ADL's. met      Long Term Goals: 12 weeks      Goal # Goal Status   1 Patient will demonstrate independence with yoga Home Exercise Program to increase strength and endurance for ADL's. Progressing   2 Patient will demonstrate independence with relaxation techniques to manage stress for immune health. Progressing   3 Patient will verbalize good understanding of stress/immune health relationship.  Progressing   4             PLAN     Plan of care Certification: 11/29/2023 to 3/8/24.    Outpatient Occupational Therapy 1 times weekly for 12 weeks to include the following interventions: Neuromuscular Re-ed, Patient Education, Self Care, Therapeutic Activities, and Therapeutic Exercise.     Hilton Palacios, OT

## 2023-12-13 ENCOUNTER — CLINICAL SUPPORT (OUTPATIENT)
Dept: REHABILITATION | Facility: HOSPITAL | Age: 58
End: 2023-12-13
Payer: COMMERCIAL

## 2023-12-13 DIAGNOSIS — F43.29 STRESS AND ADJUSTMENT REACTION: ICD-10-CM

## 2023-12-13 DIAGNOSIS — F43.9 STRESS: Primary | ICD-10-CM

## 2023-12-13 DIAGNOSIS — R53.81 PHYSICAL DECONDITIONING: ICD-10-CM

## 2023-12-13 DIAGNOSIS — E11.9 TYPE 2 DIABETES MELLITUS WITHOUT COMPLICATION: ICD-10-CM

## 2023-12-13 PROCEDURE — 97112 NEUROMUSCULAR REEDUCATION: CPT

## 2023-12-13 PROCEDURE — 97110 THERAPEUTIC EXERCISES: CPT

## 2023-12-13 PROCEDURE — 97535 SELF CARE MNGMENT TRAINING: CPT

## 2023-12-13 NOTE — PROGRESS NOTES
[facility]  Occupational Therapy Progress and  Treatment Note - Therapeutic Yoga Progam    Date: 11/29/2023  Name: Teresa Trujillo  Clinic Number: [id number]    Therapy Diagnosis:   Encounter Diagnoses   Name Primary?    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   I have been practicing at home.  She was compliant with home exercise program given last session.   Response to previous treatment: felt very good.  My shoulder was a little sore but not sure why.    Functional change: I have more emotional and physical stability.    Pain: 0/10      OBJECTIVE     Objective Measures updated at progress report unless specified.    Patient Specific Functional Scale:           Activity 5/24/23 8/9/23 9/6/23 11/8/23 12/13/23   anxiety 2       3    4      5      6   2.exercising 3        4     5       5      5   3. Pacing myself 3        3      4      5       5   4.             5.             6.             SCORE       2.6       3.3    4.3  5.0    5.6      Total Score = Sum of activity scores / number of activities  Minimum Detectable Change (90% CII) for average score = 2 points  Minimum Detectable Change (90% CI) for single activity score = 3 points         Treatment     Teresa received the treatments listed below:       Date 9/6/23 9/19/23 9/27/23 11/8/23 11/29/23 12/13/23   Therapeutic Yoga Exercises / Neuromuscular Re-education  30 minutes  PN 30  minutes 45 minutes 30 minutes  PN, POC 30 minutes 30 minutes   Seated Yoga   On bolster: bilateral  hamstring stretch with belt at feet, heroes for breathing practice, heroes for breathing practice, On floor:  twist, bound angle x 2,  Twist and forward fold in chair Twist and forward fold in chair twist and forward fold in chair   Quadruped Plank x 3, low lunge x 2, Puppy flow ,passive backbend with bolster and chair. Low lunge with chair, Low lung in sun sal. Low lunge in sun sal.   Supine wist x 3, bridge x 2, modified boat, happy baby flow  twist x 3, bridge x 2, modified boat, happy baby flow   wist x 3, bridge x 2, modified boat, happy baby flow  Core thinker x2, bridge, twist x 1,  Core thinker x2, bridge, twist x 1,   Prone  Sphinx and sphinx plank x 2 Plank, Sphinx and sphinx plank x 2  Sphinx and sphinx plank x 2   standing Down dog x 3 and Ddog  with chair, warrior 2, side angle, chair/volcano x 2, bilateral shoulder retraction with green theraband Down dog x 3 and Ddog  with chair, warrior 2, side angle, chair/volcano x 2, Down dog x 3,  warrior 2, triangle, shoulder release with belt and block, standing sun salutation up to lunge.   Backbend with chair, tree pose, warrior 1, chair pose, volcano, down dog, plank Sun salutations with low lunge, plank, down dog, cobra, warrior 2, side angle, forward fold with twist, Sun salutations with low lunge, plank, down dog, cobra, warrior 2, triangle, back bend over bolster/chair x 2,                      Self-Care/Home Management  / Therapeutic Activities  15 minutes 15 minutes 15  minutes 15 minutes 15 minutes 15 minutes            Relaxation techniques DB, Single nostril breath, body scan DB, Single nostril breath, body scan, DB, Single nostril breath, body scan, DB, body scan,  DB, body scan, DB, body scan,   Restorative  Supine with calves on chair and bolster under hips Supine with bolster under knees upine with bolster under knees Supine with calves on chair  and bolster under hips Supine with calves on chair and bolster under hips supine with calves on chair and bolster under hips   Activity Pacing    Discussed including breaks in her day for brief rest to avoid exhaustion and fatigue which lead to stress.  Reviewed including breaks in her day for brief rest to avoid exhaustion and fatigue which lead to stress.                       Stress Management/Education  - physiology of yoga/meditation and immune health    - physiology of yoga/meditation and immune health - physiology of yoga/meditation and immune health - physiology of yoga/meditation and immune health                Patient Education and Home Exercises      Education provided:   - - physiology of yoga/meditation and immune health   - Progress towards goals     Written Home Exercises Provided: yes.  Exercises were reviewed and Teresa was able to demonstrate them prior to the end of the session.  Teresa demonstrated good  understanding of the HEP provided. See EMR under Patient Instructions for exercises provided during therapy sessions.       Assessment     In today's session, Teresa reviewed her HEP that included standing, supine, quadruped and prone yoga poses.   We practiced  body scan, restorative yoga with blocks, diaphragmatic breathing to increase breath capacity. She demonstrated a good understanding of quieting thoughts by using awareness of sensation in the body for relaxation for immune health. She identified 2 new stress coping skills including diaphragmatic breathing and body scan.   Teresa tolerated the session well and required less assist for verbal and neuromuscular cues. She continues  attending the yoga class held in the [facility].     PROGRESS UPDATE:Teresa is progressing well towards her goals, see updates to goals.  Her PSFS score increased  from 5.0 to 5.6 This indicated improvement in her anxiety and activity pacing.  She has been practicing her HEP as recommended. She is  consistently attending the yoga class here with her caregiver,(). We  identified relaxation apps to assist  Pt prognosis is Good. She identified 2 new stress coping skills including diaphragmatic breathing and body scan.   Pt will continue to benefit from skilled outpatient occupational therapy to address the deficits listed in the problem list on initial evaluation provide pt/family education and to maximize pt's level of independence in the home and community environment. Pt's spiritual, cultural and educational needs considered and pt agreeable to plan of care and goals.    Anticipated barriers to occupational therapy: none    Goals:  Short Term Goals: 6 weeks      Goal # Goal Status   1 Patient will demonstrate independence with diaphragmatic breathing in sit and supine. met   2 Pt. will identify resource for audio body scan to assist with stress management/immune health met   3 Patient will identify 2 new stress coping skills for stress management/immune health. met   4 Patient will identify activity pacing problems.  Patient will then implement new plan for daily activity to increase endurance for ADL's. met      Long Term Goals: 12 weeks      Goal # Goal Status   1 Patient will demonstrate independence with yoga Home Exercise Program to increase strength and endurance for ADL's. Progressing   2 Patient will demonstrate independence with relaxation techniques to manage stress for immune health. Progressing   3 Patient will verbalize good understanding of stress/immune health relationship.  Progressing   4             PLAN     Plan of care Certification: 11/29/2023 to 3/8/24.    Outpatient Occupational Therapy 1 times weekly for 12 weeks to include the following interventions: Neuromuscular Re-ed, Patient Education, Self Care, Therapeutic Activities, and Therapeutic Exercise.     Hilton Palacios, OT

## 2023-12-18 ENCOUNTER — PATIENT MESSAGE (OUTPATIENT)
Dept: ADMINISTRATIVE | Facility: HOSPITAL | Age: 58
End: 2023-12-18
Payer: COMMERCIAL

## 2024-01-03 ENCOUNTER — CLINICAL SUPPORT (OUTPATIENT)
Dept: REHABILITATION | Facility: HOSPITAL | Age: 59
End: 2024-01-03
Payer: COMMERCIAL

## 2024-01-03 DIAGNOSIS — F43.9 STRESS: Primary | ICD-10-CM

## 2024-01-03 DIAGNOSIS — F43.29 STRESS AND ADJUSTMENT REACTION: ICD-10-CM

## 2024-01-03 DIAGNOSIS — R53.81 PHYSICAL DECONDITIONING: ICD-10-CM

## 2024-01-03 PROCEDURE — 97110 THERAPEUTIC EXERCISES: CPT

## 2024-01-03 PROCEDURE — 97112 NEUROMUSCULAR REEDUCATION: CPT

## 2024-01-03 PROCEDURE — 97535 SELF CARE MNGMENT TRAINING: CPT

## 2024-01-03 NOTE — PROGRESS NOTES
[facility]  Occupational Therapy Progress and  Treatment Note - Therapeutic Yoga Progam    Date: 11/29/2023  Name: Teresa Trujillo  Clinic Number: [id number]    Therapy Diagnosis:   Encounter Diagnoses   Name Primary?    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  She was compliant with home exercise program given last session.   Response to previous treatment: felt very  strong the next day when I did my HEP.  Functional change: I have more emotional and physical stability.    Pain: 0/10      OBJECTIVE     Objective Measures updated at progress report unless specified.    Patient Specific Functional Scale:           Activity 5/24/23 8/9/23 9/6/23 11/8/23 12/13/23   anxiety 2       3    4      5      6   2.exercising 3        4     5       5      5   3. Pacing myself 3        3      4      5       5   4.             5.             6.             SCORE       2.6       3.3    4.3  5.0    5.6      Total Score = Sum of activity scores / number of activities  Minimum Detectable Change (90% CII) for average score = 2 points  Minimum Detectable Change (90% CI) for single activity score = 3 points         Treatment     Teresa received the treatments listed below:       Date 11/8/23 11/29/23 12/13/23 1/3/24   Therapeutic Yoga Exercises / Neuromuscular Re-education 30 minutes  PN, POC 30 minutes 30 minutes 30 minute   Seated Yoga    Twist and forward fold in chair Twist and forward fold in chair twist and forward fold in chair Modified boat and bound angle flow,   Quadruped Low lunge with chair, Low lung in sun sal. Low lunge in sun sal. Low lunge in sun sal.   Supine wist x 3, bridge x 2, modified boat, happy baby flow  Core thinker x2, bridge, twist x 1,  Core thinker x2, bridge, twist x 1, Core thinker x2, bridge, twist x 1,   Prone Sphinx and sphinx plank x 2  Sphinx and sphinx plank x 2    standing Backbend with chair, tree pose, warrior 1, chair pose, volcano, down dog, plank Sun salutations with low lunge, plank, down dog, cobra, warrior 2, side angle, forward fold with twist, Sun salutations with low lunge, plank, down dog, cobra, warrior 2, triangle, back bend over bolster/chair x 2,  Sun salutations with low lunge, plank, down dog, cobra, warrior 2, triangle with chair,                  Self-Care/Home Management  / Therapeutic Activities 15 minutes 15 minutes 15 minutes 15 minutes          Relaxation techniques DB, body scan,  DB, body scan, DB, body scan, DB, body scan,   Restorative  Supine with calves on chair and bolster under hips Supine with calves on chair and bolster under hips supine with calves on chair and bolster under hips supine with calves on chair and bolster under hips   Activity Pacing Discussed including breaks in her day for brief rest to avoid exhaustion and fatigue which lead to stress.  Reviewed including breaks in her day for brief rest to avoid exhaustion and fatigue which lead to stress.                    Stress Management/Education  - physiology of yoga/meditation and immune health - physiology of yoga/meditation and immune health - physiology of yoga/meditation and immune health - physiology of yoga/meditation and immune health              Patient Education and Home Exercises      Education provided:   - - physiology of yoga/meditation and immune health   - Progress towards goals     Written Home  Exercises Provided: yes.  Exercises were reviewed and Teresa was able to demonstrate them prior to the end of the session.  Teresa demonstrated good  understanding of the HEP provided. See EMR under Patient Instructions for exercises provided during therapy sessions.       Assessment     In today's session, Teresa reviewed her HEP that included standing, supine, quadruped and prone yoga poses.   We practiced  body scan, restorative yoga with blocks, diaphragmatic breathing to increase breath capacity. She demonstrated a good understanding of quieting thoughts by using awareness of sensation in the body for relaxation for immune health. She reports that she continues to practice  2 new stress coping skills including diaphragmatic breathing and body scan.   Teresa tolerated the session well and required less assist for verbal and neuromuscular cues. She continues  attending the yoga class held in the [facility].     PROGRESS UPDATE:Teresa is progressing well towards her goals, see updates to goals.  Her PSFS score increased  from 5.0 to 5.6 This indicated improvement in her anxiety and activity pacing.  She has been practicing her HEP as recommended. She is consistently attending the yoga class here with her caregiver,(). We  identified relaxation apps to assist  Pt prognosis is Good. She identified 2 new stress coping skills including diaphragmatic breathing and body scan.   Pt will continue to benefit from skilled outpatient occupational therapy to address the deficits listed in the problem list on initial evaluation provide pt/family education and to maximize pt's level of independence in the home and community environment. Pt's spiritual, cultural and educational needs considered and pt agreeable to plan of care and goals.    Anticipated barriers to occupational therapy: none    Goals:  Short Term Goals: 6 weeks      Goal # Goal Status   1 Patient will demonstrate independence with diaphragmatic  breathing in sit and supine. met   2 Pt. will identify resource for audio body scan to assist with stress management/immune health met   3 Patient will identify 2 new stress coping skills for stress management/immune health. met   4 Patient will identify activity pacing problems.  Patient will then implement new plan for daily activity to increase endurance for ADL's. met      Long Term Goals: 12 weeks      Goal # Goal Status   1 Patient will demonstrate independence with yoga Home Exercise Program to increase strength and endurance for ADL's. Progressing   2 Patient will demonstrate independence with relaxation techniques to manage stress for immune health. Progressing   3 Patient will verbalize good understanding of stress/immune health relationship.  Progressing   4             PLAN     Plan of care Certification: 11/29/2023 to 3/8/24.    Outpatient Occupational Therapy 1 times weekly for 12 weeks to include the following interventions: Neuromuscular Re-ed, Patient Education, Self Care, Therapeutic Activities, and Therapeutic Exercise.     Hilton Palacios OT

## 2024-01-10 ENCOUNTER — CLINICAL SUPPORT (OUTPATIENT)
Dept: REHABILITATION | Facility: HOSPITAL | Age: 59
End: 2024-01-10
Payer: COMMERCIAL

## 2024-01-10 DIAGNOSIS — F43.9 STRESS: Primary | ICD-10-CM

## 2024-01-10 DIAGNOSIS — R53.81 PHYSICAL DECONDITIONING: ICD-10-CM

## 2024-01-10 DIAGNOSIS — F43.29 STRESS AND ADJUSTMENT REACTION: ICD-10-CM

## 2024-01-10 PROCEDURE — 97112 NEUROMUSCULAR REEDUCATION: CPT

## 2024-01-10 PROCEDURE — 97535 SELF CARE MNGMENT TRAINING: CPT

## 2024-01-10 PROCEDURE — 97110 THERAPEUTIC EXERCISES: CPT

## 2024-01-10 NOTE — PROGRESS NOTES
[facility]  Occupational Therapy Progress and  Treatment Note - Therapeutic Yoga Progam    Date: 1/10/2024  Name: Teresa Trujillo  Clinic Number: [id number]    Therapy Diagnosis:   Encounter Diagnoses   Name Primary?    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   Response to previous treatment: felt very  strong the next day when I did my HEP.  Functional change: I have more emotional and physical stability.    Pain: 0/10      OBJECTIVE     Objective Measures updated at progress report unless specified.    Patient Specific Functional Scale:           Activity 5/24/23 8/9/23   9/6/23  11/8/23  12/13/23 1/10/24   anxiety 2       3    4      5      6 5   2.exercising 3        4     5       5      5 4   3. Pacing myself 3        3      4      5       5 5   4.              5.              6.              SCORE       2.6       3.3    4.3  5.0    5.6       Total Score = Sum of activity scores / number of activities  Minimum Detectable Change (90% CII) for average score = 2 points  Minimum Detectable Change (90% CI) for single activity score = 3 points         Treatment     Teresa received the treatments listed below:       Date 11/8/23 11/29/23 12/13/23 1/3/24   1/10/24   Therapeutic Yoga Exercises / Neuromuscular Re-education 30 minutes  PN, POC 30 minutes 30 minutes 30 minute 30 minutes  PN   Seated Yoga    Twist and forward fold in chair Twist and forward fold in chair twist and forward fold in chair Modified boat and bound angle flow, Modified boat and bound angle flow,   Quadruped Low lunge with chair, Low lung in sun sal. Low lunge in sun sal. Low lunge in sun sal. Low lunge in sun sal.   Supine wist x 3, bridge x 2, modified boat, happy baby flow  Core thinker x2, bridge, twist x 1,  Core thinker x2, bridge, twist x 1, Core thinker x2, bridge, twist x 1, Core thinker x2, bridge, twist x 1   Prone Sphinx and sphinx plank x 2  Sphinx and sphinx plank x 2  Cobra x a   standing Backbend with chair, tree pose, warrior 1, chair pose, volcano, down dog, plank Sun salutations with low lunge, plank, down dog, cobra, warrior 2, side angle, forward fold with twist, Sun salutations with low lunge, plank, down dog, cobra, warrior 2, triangle, back bend over bolster/chair x 2,  Sun salutations with low lunge, plank, down dog, cobra, warrior 2, triangle with chair,  Sun salutations with low lunge, plank, down dog, cobra, warrior 2, triangle with chair,                    Self-Care/Home Management  / Therapeutic Activities 15 minutes 15 minutes 15 minutes 15 minutes            Relaxation techniques DB, body scan,  DB, body scan, DB, body scan, DB, body scan,    Restorative  Supine with calves on chair and bolster under hips Supine with calves on chair and bolster under hips supine with calves on chair and bolster under hips supine with calves on chair and bolster under hips    Activity Pacing Discussed including breaks in her day for brief rest to avoid exhaustion and fatigue which lead to stress.  Reviewed including breaks in her day for brief rest to avoid exhaustion and fatigue which lead to stress.                       Stress Management/Education  - physiology of yoga/meditation and immune health - physiology of yoga/meditation and immune health - physiology of yoga/meditation and immune health - physiology of  yoga/meditation and immune health - physiology of yoga/meditation and immune health               Patient Education and Home Exercises      Education provided:   - - physiology of yoga/meditation and immune health   - Progress towards goals     Written Home Exercises Provided: yes.  Exercises were reviewed and Teresa was able to demonstrate them prior to the end of the session.  Teresa demonstrated good  understanding of the HEP provided. See EMR under Patient Instructions for exercises provided during therapy sessions.       Assessment     In today's session, Teresa reviewed her HEP that included standing, supine, quadruped and prone yoga poses.   We practiced  body scan, restorative yoga with blocks, diaphragmatic breathing to increase breath capacity. She demonstrated a good understanding of quieting thoughts by using awareness of sensation in the body for relaxation for immune health. She reports that she continues to practice  2 new stress coping skills including diaphragmatic breathing and body scan.   Teresa tolerated the session well and required less assist for verbal and neuromuscular cues. She continues  attending the yoga class held in the [facility].     PROGRESS UPDATE:Teresa is progressing well towards her goals, see updates to goals.  Her PSFS score increased  from 5.0 to 5.6 This indicated improvement in her anxiety and activity pacing.  She has been practicing her HEP as recommended. She is consistently attending the yoga class here with her caregiver,(). We  identified relaxation apps to assist  Pt prognosis is Good. She identified 2 new stress coping skills including diaphragmatic breathing and body scan.   Pt will continue to benefit from skilled outpatient occupational therapy to address the deficits listed in the problem list on initial evaluation provide pt/family education and to maximize pt's level of independence in the home and community environment. Pt's spiritual,  cultural and educational needs considered and pt agreeable to plan of care and goals.    Anticipated barriers to occupational therapy: none    Goals:  Short Term Goals: 6 weeks      Goal # Goal Status   1 Patient will demonstrate independence with diaphragmatic breathing in sit and supine. met   2 Pt. will identify resource for audio body scan to assist with stress management/immune health met   3 Patient will identify 2 new stress coping skills for stress management/immune health. met   4 Patient will identify activity pacing problems.  Patient will then implement new plan for daily activity to increase endurance for ADL's. met      Long Term Goals: 12 weeks      Goal # Goal Status   1 Patient will demonstrate independence with yoga Home Exercise Program to increase strength and endurance for ADL's. Progressing   2 Patient will demonstrate independence with relaxation techniques to manage stress for immune health. Progressing   3 Patient will verbalize good understanding of stress/immune health relationship.  Progressing   4             PLAN     Plan of care Certification: 1/10/2024 to 3/8/24.    Outpatient Occupational Therapy 1 times weekly for 12 weeks to include the following interventions: Neuromuscular Re-ed, Patient Education, Self Care, Therapeutic Activities, and Therapeutic Exercise.     Hilton Palacios OT

## 2024-01-24 ENCOUNTER — CLINICAL SUPPORT (OUTPATIENT)
Dept: REHABILITATION | Facility: HOSPITAL | Age: 59
End: 2024-01-24
Payer: COMMERCIAL

## 2024-01-24 DIAGNOSIS — R53.81 PHYSICAL DECONDITIONING: ICD-10-CM

## 2024-01-24 DIAGNOSIS — F43.9 STRESS: Primary | ICD-10-CM

## 2024-01-24 DIAGNOSIS — F43.29 STRESS AND ADJUSTMENT REACTION: ICD-10-CM

## 2024-01-24 PROCEDURE — 97535 SELF CARE MNGMENT TRAINING: CPT

## 2024-01-24 PROCEDURE — 97112 NEUROMUSCULAR REEDUCATION: CPT

## 2024-01-24 PROCEDURE — 97110 THERAPEUTIC EXERCISES: CPT

## 2024-01-24 NOTE — PROGRESS NOTES
[facility]  Occupational Therapy Treatment Note - Therapeutic Yoga Progam    Date: 1/24/2024  Name: Teresa Trujillo  Clinic Number: [id number]    Therapy Diagnosis:   Encounter Diagnoses   Name Primary?    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  I have been this way since my diagnosis.  She was compliant with home exercise program given last session.   Response to previous treatment: felt very  strong the next day when I did my HEP.  Functional change: I have more emotional and physical stability.    Pain: 0/10      OBJECTIVE     Objective Measures updated at progress report unless specified.    Patient Specific Functional Scale:           Activity 5/24/23 8/9/23   9/6/23  11/8/23  12/13/23 1/10/24    anxiety 2       3    4      5      6 5         2.exercising 3        4     5       5      5 4          3. Pacing myself 3        3      4      5       5 5          4.               5.               6.               SCORE       2.6       3.3    4.3  5.0    5.6   4.6       Total Score = Sum of activity scores / number of activities  Minimum Detectable Change (90% CII) for average score = 2 points  Minimum Detectable Change (90% CI) for single activity score = 3 points         Treatment     Teresa received the treatments listed below:     I      Date 11/8/23 11/29/23 12/13/23 1/3/24   1/10/24 1/24/24   Therapeutic Yoga Exercises /  Neuromuscular Re-education 30 minutes  PN, POC 30 minutes 30 minutes 30 minute 45 minutes  PN 15 minutes   Seated Yoga   Twist and forward fold in chair Twist and forward fold in chair twist and forward fold in chair Modified boat and bound angle flow, Modified boat and bound angle flow, Modified boat and bound angle flow,   Quadruped Low lunge with chair, Low lung in sun sal. Low lunge in sun sal. Low lunge in sun sal. Low lunge in sun sal.    Supine wist x 3, bridge x 2, modified boat, happy baby flow  Core thinker x2, bridge, twist x 1,  Core thinker x2, bridge, twist x 1, Core thinker x2, bridge, twist x 1, Core thinker x2, bridge, twist x 1 bridge, twist x 1   Prone Sphinx and sphinx plank x 2  Sphinx and sphinx plank x 2  Cobra x a    standing Backbend with chair, tree pose, warrior 1, chair pose, volcano, down dog, plank Sun salutations with low lunge, plank, down dog, cobra, warrior 2, side angle, forward fold with twist, Sun salutations with low lunge, plank, down dog, cobra, warrior 2, triangle, back bend over bolster/chair x 2,  Sun salutations with low lunge, plank, down dog, cobra, warrior 2, triangle with chair,  Sun salutations with low lunge, plank, down dog, cobra, warrior 2, triangle with chair,                       Self-Care/Home Management  / Therapeutic Activities 15 minutes 15 minutes 15 minutes 15 minutes  45 minutes            Relaxation techniques DB, body scan,  DB, body scan, DB, body scan, DB, body scan,  DB, viloma on inhale with hands on belly/chest, exhale sigh, body scan on breath meditation, body scan on whole body in sit and supine.   Restorative  Supine with calves on chair and bolster under hips Supine with calves on chair and bolster under hips supine with calves on chair and bolster under hips supine with calves on chair and bolster under hips  Bridge and bound angle with bolsters   Activity Pacing Discussed including breaks in her day for brief rest to avoid exhaustion and  fatigue which lead to stress.  Reviewed including breaks in her day for brief rest to avoid exhaustion and fatigue which lead to stress.                          Stress Management/Education  - physiology of yoga/meditation and immune health - physiology of yoga/meditation and immune health - physiology of yoga/meditation and immune health - physiology of yoga/meditation and immune health - physiology of yoga/meditation and immune health - physiology of yoga/meditation and immune health                Patient Education and Home Exercises      Education provided:   - - physiology of yoga/meditation and immune health   - Progress towards goals     Written Home Exercises Provided: yes.  Exercises were reviewed and Teresa was able to demonstrate them prior to the end of the session.  Teresa demonstrated good  understanding of the HEP provided. See EMR under Patient Instructions for exercises provided during therapy sessions.       Assessment     In today's session, we focused on using yoga relxation techniques to manage her anxiety.  She required assistance to plan a morning practice that includes body scan, restorative yoga with blocks, diaphragmatic  and viloma breathing exercises.  She was assisted in finding a guided meditation on her phone.   She demonstrated a good understanding of quieting thoughts by using awareness of sensation in the body for relaxation for immune health. She reports that she continues to practice  2 new stress coping skills including diaphragmatic breathing and body scan.   Teresa tolerated the session well and required less assist for verbal and neuromuscular cues. She continues  attending the yoga class held in the [facility].     PROGRESS UPDATE:Teresa is progressing well towards her goals, see updates to goals.  Her PSFS score decreased 5.6 to 4.6 due to ongoing anxiety related to her diagnosis. She has been practicing her HEP as recommended. She is consistently attending the  yoga class here with her caregiver,(). We  identified relaxation apps to assist  Pt prognosis is Good. She identified 2 new stress coping skills including diaphragmatic breathing and body scan.   Pt will continue to benefit from skilled outpatient occupational therapy to address the deficits listed in the problem list on initial evaluation provide pt/family education and to maximize pt's level of independence in the home and community environment. Pt's spiritual, cultural and educational needs considered and pt agreeable to plan of care and goals.    Anticipated barriers to occupational therapy: none    Goals:  Short Term Goals: 6 weeks      Goal # Goal Status   1 Patient will demonstrate independence with diaphragmatic breathing in sit and supine. met   2 Pt. will identify resource for audio body scan to assist with stress management/immune health met   3 Patient will identify 2 new stress coping skills for stress management/immune health. met   4 Patient will identify activity pacing problems.  Patient will then implement new plan for daily activity to increase endurance for ADL's. met      Long Term Goals: 12 weeks      Goal # Goal Status   1 Patient will demonstrate independence with yoga Home Exercise Program to increase strength and endurance for ADL's. Progressing   2 Patient will demonstrate independence with relaxation techniques to manage stress for immune health. Progressing   3 Patient will verbalize good understanding of stress/immune health relationship.  Progressing   4             PLAN     Plan of care Certification: 1/24/2024 to 3/8/24.    Outpatient Occupational Therapy 1 times weekly for 12 weeks to include the following interventions: Neuromuscular Re-ed, Patient Education, Self Care, Therapeutic Activities, and Therapeutic Exercise.     Hilton Palacios, OT

## 2024-01-31 ENCOUNTER — CLINICAL SUPPORT (OUTPATIENT)
Dept: REHABILITATION | Facility: HOSPITAL | Age: 59
End: 2024-01-31
Payer: COMMERCIAL

## 2024-01-31 DIAGNOSIS — F43.29 STRESS AND ADJUSTMENT REACTION: ICD-10-CM

## 2024-01-31 DIAGNOSIS — R53.81 PHYSICAL DECONDITIONING: ICD-10-CM

## 2024-01-31 DIAGNOSIS — F43.9 STRESS: Primary | ICD-10-CM

## 2024-01-31 PROCEDURE — 97112 NEUROMUSCULAR REEDUCATION: CPT

## 2024-01-31 PROCEDURE — 97535 SELF CARE MNGMENT TRAINING: CPT

## 2024-01-31 PROCEDURE — 97110 THERAPEUTIC EXERCISES: CPT

## 2024-01-31 NOTE — PROGRESS NOTES
[facility]  Occupational Therapy Treatment Note - Therapeutic Yoga Progam    Date: 1/31/2024  Name: Teresa Trujillo  Clinic Number: [id number]    Therapy Diagnosis:   Encounter Diagnoses   Name Primary?    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  I have been this way since my diagnosis.  She was compliant with home exercise program given last session.   Response to previous treatment: felt very  strong the next day when I did my HEP.  Functional change: I have more emotional and physical stability.    Pain: 0/10      OBJECTIVE     Objective Measures updated at progress report unless specified.    Patient Specific Functional Scale:           Activity 5/24/23 8/9/23   9/6/23  11/8/23  12/13/23 1/10/24    anxiety 2       3    4      5      6 5         2.exercising 3        4     5       5      5 4          3. Pacing myself 3        3      4      5       5 5          4.               5.               6.               SCORE       2.6       3.3    4.3  5.0    5.6   4.6       Total Score = Sum of activity scores / number of activities  Minimum Detectable Change (90% CII) for average score = 2 points  Minimum Detectable Change (90% CI) for single activity score = 3 points         Treatment     Teresa received the treatments listed below:     I      Date 11/8/23 11/29/23 12/13/23 1/3/24   1/10/24 1/24/24 1/31/24   Therapeutic Yoga Exercises /  Neuromuscular Re-education 30 minutes  PN, POC 30 minutes 30 minutes 30 minute 45 minutes  PN 15 minutes 30 minutes   Seated Yoga   Twist and forward fold in chair Twist and forward fold in chair twist and forward fold in chair Modified boat and bound angle flow, Modified boat and bound angle flow, Modified boat and bound angle flow,    Quadruped Low lunge with chair, Low lung in sun sal. Low lunge in sun sal. Low lunge in sun sal. Low lunge in sun sal.     Supine wist x 3, bridge x 2, modified boat, happy baby flow  Core thinker x2, bridge, twist x 1,  Core thinker x2, bridge, twist x 1, Core thinker x2, bridge, twist x 1, Core thinker x2, bridge, twist x 1 bridge, twist x 1 Bound angle, happy baby flow,   Prone Sphinx and sphinx plank x 2  Sphinx and sphinx plank x 2  Cobra x a  Cobra, sphinx   standing Backbend with chair, tree pose, warrior 1, chair pose, volcano, down dog, plank Sun salutations with low lunge, plank, down dog, cobra, warrior 2, side angle, forward fold with twist, Sun salutations with low lunge, plank, down dog, cobra, warrior 2, triangle, back bend over bolster/chair x 2,  Sun salutations with low lunge, plank, down dog, cobra, warrior 2, triangle with chair,  Sun salutations with low lunge, plank, down dog, cobra, warrior 2, triangle with chair,   Sun salutations with low lunge, plank, down dog, cobra, warrior 2, triangle with chair, down dog with chair                       Self-Care/Home Management  / Therapeutic Activities 15 minutes 15 minutes 15 minutes 15 minutes  45 minutes 15 minutes             Relaxation techniques DB, body scan,  DB, body scan, DB, body scan, DB, body scan,  DB, viloma on inhale with hands on belly/chest, exhale sigh, body scan on breath meditation, body scan on whole body in sit and supine. DB, viloma on inhale with hands on belly/chest, exhale sigh, body scan on breath meditation, body scan on whole body in sit and supine.   Restorative  Supine with calves on  chair and bolster under hips Supine with calves on chair and bolster under hips supine with calves on chair and bolster under hips supine with calves on chair and bolster under hips  Bridge and bound angle with bolsters Bridge and bound angle with bolsters   Activity Pacing Discussed including breaks in her day for brief rest to avoid exhaustion and fatigue which lead to stress.  Reviewed including breaks in her day for brief rest to avoid exhaustion and fatigue which lead to stress.                             Stress Management/Education  - physiology of yoga/meditation and immune health - physiology of yoga/meditation and immune health - physiology of yoga/meditation and immune health - physiology of yoga/meditation and immune health - physiology of yoga/meditation and immune health - physiology of yoga/meditation and immune health - physiology of yoga/meditation and immune health                 Patient Education and Home Exercises      Education provided:   - - physiology of yoga/meditation and immune health   - Progress towards goals     Written Home Exercises Provided: yes.  Exercises were reviewed and Teresa was able to demonstrate them prior to the end of the session.  Teresa demonstrated good  understanding of the HEP provided. See EMR under Patient Instructions for exercises provided during therapy sessions.       Assessment     In today's session, we focused on using yoga relxation techniques to manage her anxiety.  She required assistance to plan a morning practice that includes body scan, restorative yoga with blocks, diaphragmatic  and viloma breathing exercises.  She was assisted in finding a guided meditation on her phone.   She demonstrated a good understanding of quieting thoughts by using awareness of sensation in the body for relaxation for immune health. She reports that she continues to practice  2 new stress coping skills including diaphragmatic breathing and body scan.   Teresa tolerated the  session well and required less assist for verbal and neuromuscular cues. She continues  attending the yoga class held in the [facility].     PROGRESS UPDATE:Teresa is progressing well towards her goals, see updates to goals.  Her PSFS score decreased 5.6 to 4.6 due to ongoing anxiety related to her diagnosis. She has been practicing her HEP as recommended. She is consistently attending the yoga class here with her caregiver,(). We  identified relaxation apps to assist  Pt prognosis is Good. She identified 2 new stress coping skills including diaphragmatic breathing and body scan.   Pt will continue to benefit from skilled outpatient occupational therapy to address the deficits listed in the problem list on initial evaluation provide pt/family education and to maximize pt's level of independence in the home and community environment. Pt's spiritual, cultural and educational needs considered and pt agreeable to plan of care and goals.    Anticipated barriers to occupational therapy: none    Goals:  Short Term Goals: 6 weeks      Goal # Goal Status   1 Patient will demonstrate independence with diaphragmatic breathing in sit and supine. met   2 Pt. will identify resource for audio body scan to assist with stress management/immune health met   3 Patient will identify 2 new stress coping skills for stress management/immune health. met   4 Patient will identify activity pacing problems.  Patient will then implement new plan for daily activity to increase endurance for ADL's. met      Long Term Goals: 12 weeks      Goal # Goal Status   1 Patient will demonstrate independence with yoga Home Exercise Program to increase strength and endurance for ADL's. Progressing   2 Patient will demonstrate independence with relaxation techniques to manage stress for immune health. Progressing   3 Patient will verbalize good understanding of stress/immune health relationship.  Progressing   4             PLAN     Plan of  care Certification: 1/31/2024 to 3/8/24.    Outpatient Occupational Therapy 1 times weekly for 12 weeks to include the following interventions: Neuromuscular Re-ed, Patient Education, Self Care, Therapeutic Activities, and Therapeutic Exercise.     Hilton Palacios OT

## 2024-02-01 ENCOUNTER — PATIENT MESSAGE (OUTPATIENT)
Dept: INTERNAL MEDICINE | Facility: CLINIC | Age: 59
End: 2024-02-01
Payer: COMMERCIAL

## 2024-02-02 RX ORDER — LEVOCETIRIZINE DIHYDROCHLORIDE 5 MG/1
5 TABLET, FILM COATED ORAL NIGHTLY
Qty: 90 TABLET | Refills: 1 | Status: SHIPPED | OUTPATIENT
Start: 2024-02-02 | End: 2024-02-06 | Stop reason: SDUPTHER

## 2024-02-02 NOTE — TELEPHONE ENCOUNTER
Care Due:                  Date            Visit Type   Department     Provider  --------------------------------------------------------------------------------                                EP -                              PRIMARY      [facility]  Last Visit: 05-      CARE ([facility])   MEDICINE       SHANON ROLDAN  Next Visit: None Scheduled  None         None Found                                                            Last  Test          Frequency    Reason                     Performed    Due Date  --------------------------------------------------------------------------------    CMP.........  12 months..  lisinopriL...............  01- 01-    [facility] Embedded Care Due Messages. Reference number: [id number].   2/02/2024 7:29:04 AM CST

## 2024-02-02 NOTE — TELEPHONE ENCOUNTER
Refill Routing Note   Medication(s) are not appropriate for processing by [facility] for the following reason(s):        No active prescription written by provider    [facility] action(s):  Defer     Requires labs : Yes      Medication Therapy Plan: historical report only; Also this is available OTC but may be covered by insurance with rx given.      Appointments  past 12m or future 3m with PCP    Date Provider   Last Visit   5/29/2023 Keith Freeman MD   Next Visit   Visit date not found Keith Freeman MD   ED visits in past 90 days: 0        Note composed:7:45 AM 02/02/2024

## 2024-02-05 ENCOUNTER — TELEPHONE (OUTPATIENT)
Dept: INTERNAL MEDICINE | Facility: CLINIC | Age: 59
End: 2024-02-05
Payer: COMMERCIAL

## 2024-02-05 NOTE — TELEPHONE ENCOUNTER
----- Message from Raymundo Reich sent at 2/2/2024  4:31 PM CST -----  Contact: Pharm [phone]  Pharmacy is calling to clarify or change an RX.  RX name:  levocetirizine (XYZAL) 5 MG tablet   What do they need to clarify:  clarification for use of meds/ is it once every evening or once every morning/  Also this location is not pt's original Pharmacy so pharmacy ask that dr confirms the location for meds.  Additional comments:

## 2024-02-05 NOTE — TELEPHONE ENCOUNTER
Since this is not the pt's original pharmacy the pharmacist is requesting a new prescription be sent and the directions(currently have 2 sets of directions) Pharmacist is asking for correct directions be on new prescription.     Disp Refills Start End [facility]   levocetirizine (XYZAL) 5 MG tablet 90 tablet 1 2/2/2024 -- No   Sig - Route: Take 1 tablet (5 mg total) by mouth every evening. TAKE 1 BY MOUTH EVERY MORNING. - Oral   Sent to pharmacy as: levocetirizine (XYZAL) 5 MG tablet

## 2024-02-06 DIAGNOSIS — Z12.11 COLON CANCER SCREENING: ICD-10-CM

## 2024-02-06 RX ORDER — LEVOCETIRIZINE DIHYDROCHLORIDE 5 MG/1
5 TABLET, FILM COATED ORAL NIGHTLY
Qty: 90 TABLET | Refills: 1 | Status: SHIPPED | OUTPATIENT
Start: 2024-02-06

## 2024-02-07 ENCOUNTER — CLINICAL SUPPORT (OUTPATIENT)
Dept: REHABILITATION | Facility: HOSPITAL | Age: 59
End: 2024-02-07
Payer: COMMERCIAL

## 2024-02-07 DIAGNOSIS — F43.29 STRESS AND ADJUSTMENT REACTION: ICD-10-CM

## 2024-02-07 DIAGNOSIS — R53.81 PHYSICAL DECONDITIONING: ICD-10-CM

## 2024-02-07 DIAGNOSIS — F43.9 STRESS: Primary | ICD-10-CM

## 2024-02-07 PROCEDURE — 97112 NEUROMUSCULAR REEDUCATION: CPT

## 2024-02-07 PROCEDURE — 97110 THERAPEUTIC EXERCISES: CPT

## 2024-02-07 PROCEDURE — 97535 SELF CARE MNGMENT TRAINING: CPT

## 2024-02-07 NOTE — PROGRESS NOTES
[facility]  Occupational Therapy Treatment Note - Therapeutic Yoga Progam    Date: 2/7/2024  Name: Teresa Trujillo  Clinic Number: [id number]    Therapy Diagnosis:   Encounter Diagnoses   Name Primary?    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   She was compliant with home exercise program given last session.   Response to previous treatment: felt very  strong the next day when I did my HEP.  Functional change: I have more emotional and physical stability.    Pain: 0/10      OBJECTIVE     Objective Measures updated at progress report unless specified.    Patient Specific Functional Scale:           Activity 5/24/23 8/9/23   9/6/23  11/8/23  12/13/23 1/10/24 2/7/24   anxiety 2       3    4      5      6 5       6   2.exercising 3        4     5       5      5 4       6     3. Pacing myself 3        3      4      5       5 5        5   4.               5.               6.               SCORE       2.6       3.3    4.3  5.0    5.6   4.6    5.6      Total Score = Sum of activity scores / number of activities  Minimum Detectable Change (90% CII) for average score = 2 points  Minimum Detectable Change (90% CI) for single activity score = 3 points         Treatment     Teresa received the treatments listed below:     I      Date 11/8/23 11/29/23 12/13/23 1/3/24   1/10/24 1/24/24 1/31/24 2/7/24   Therapeutic Yoga Exercises /  Neuromuscular Re-education 30 minutes  PN, POC 30 minutes 30 minutes 30 minute 45 minutes  PN 15 minutes 30 minutes 30 minutes   Seated Yoga   Twist and forward fold in chair Twist and forward fold in chair twist and forward fold in chair Modified boat and bound angle flow, Modified boat and bound angle flow, Modified boat and bound angle flow,     Quadruped Low lunge with chair, Low lung in sun sal. Low lunge in sun sal. Low lunge in sun sal. Low lunge in sun sal.      Supine wist x 3, bridge x 2, modified boat, happy baby flow  Core thinker x2, bridge, twist x 1,  Core thinker x2, bridge, twist x 1, Core thinker x2, bridge, twist x 1, Core thinker x2, bridge, twist x 1 bridge, twist x 1 Bound angle, happy baby flow, ound angle, happy baby flow,   Prone Sphinx and sphinx plank x 2  Sphinx and sphinx plank x 2  Cobra x a  Cobra, sphinx    standing Backbend with chair, tree pose, warrior 1, chair pose, volcano, down dog, plank Sun salutations with low lunge, plank, down dog, cobra, warrior 2, side angle, forward fold with twist, Sun salutations with low lunge, plank, down dog, cobra, warrior 2, triangle, back bend over bolster/chair x 2,  Sun salutations with low lunge, plank, down dog, cobra, warrior 2, triangle with chair,  Sun salutations with low lunge, plank, down dog, cobra, warrior 2, triangle with chair,   Sun salutations with low lunge, plank, down dog, cobra, warrior 2, triangle with chair, down dog with chair warrior 2, triangle with chair, down dog with chair,                         Self-Care/Home Management  / Therapeutic Activities 15 minutes 15 minutes 15 minutes 15 minutes  45 minutes 15 minutes 15 minutes              Relaxation techniques DB, body scan,  DB, body scan, DB, body scan, DB, body scan,  DB, viloma on inhale with hands on belly/chest, exhale sigh, body scan on breath meditation, body scan on whole body in sit and supine. DB, viloma on inhale with hands on belly/chest, exhale sigh, body  scan on breath meditation, body scan on whole body in sit and supine. DB, body scan   Restorative  Supine with calves on chair and bolster under hips Supine with calves on chair and bolster under hips supine with calves on chair and bolster under hips supine with calves on chair and bolster under hips  Bridge and bound angle with bolsters Bridge and bound angle with bolsters Supine in bound angle on 2 bolsters   Activity Pacing Discussed including breaks in her day for brief rest to avoid exhaustion and fatigue which lead to stress.  Reviewed including breaks in her day for brief rest to avoid exhaustion and fatigue which lead to stress.                                Stress Management/Education  - physiology of yoga/meditation and immune health - physiology of yoga/meditation and immune health - physiology of yoga/meditation and immune health - physiology of yoga/meditation and immune health - physiology of yoga/meditation and immune health - physiology of yoga/meditation and immune health - physiology of yoga/meditation and immune health                   Patient Education and Home Exercises      Education provided:   - - physiology of yoga/meditation and immune health   - Progress towards goals     Written Home Exercises Provided: yes.  Exercises were reviewed and Teresa was able to demonstrate them prior to the end of the session.  Teresa demonstrated good  understanding of the HEP provided. See EMR under Patient Instructions for exercises provided during therapy sessions.       Assessment       In today's session, Teresa practiced her yoga HEP consisting of strengthening, stretching, and balance yoga exercise.  She also practiced her  breathing and body scan techniques to assist with relaxation/immune health. She tolerated the session well and required maximum verbal and neuromuscular cues in all treatment. he was assisted in finding a guided meditation on her phone.   She demonstrated a good understanding of  quieting thoughts by using awareness of sensation in the body for relaxation for immune health. She reports that she continues to practice  2 new stress coping skills including diaphragmatic breathing and body scan.   Teresa tolerated the session well and required less assist for verbal and neuromuscular cues. She continues  attending the yoga class held in the [facility].     PROGRESS UPDATE:Teresa is progressing well towards her goals, see updates to goals.  Her PSFS score decreased 5.6 to 4.6 due to ongoing anxiety related to her diagnosis. She has been practicing her HEP as recommended. She is consistently attending the yoga class here with her caregiver,(). We  identified relaxation apps to assist  Pt prognosis is Good. She identified 2 new stress coping skills including diaphragmatic breathing and body scan.   Pt will continue to benefit from skilled outpatient occupational therapy to address the deficits listed in the problem list on initial evaluation provide pt/family education and to maximize pt's level of independence in the home and community environment. Pt's spiritual, cultural and educational needs considered and pt agreeable to plan of care and goals.    Anticipated barriers to occupational therapy: none    Goals:  Short Term Goals: 6 weeks      Goal # Goal Status   1 Patient will demonstrate independence with diaphragmatic breathing in sit and supine. met   2 Pt. will identify resource for audio body scan to assist with stress management/immune health met   3 Patient will identify 2 new stress coping skills for stress management/immune health. met   4 Patient will identify activity pacing problems.  Patient will then implement new plan for daily activity to increase endurance for ADL's. met      Long Term Goals: 12 weeks      Goal # Goal Status   1 Patient will demonstrate independence with yoga Home Exercise Program to increase strength and endurance for ADL's. Progressing   2  Patient will demonstrate independence with relaxation techniques to manage stress for immune health. Progressing   3 Patient will verbalize good understanding of stress/immune health relationship.  Progressing   4             PLAN     Plan of care Certification: 2/7/2024 to 3/8/24.    Outpatient Occupational Therapy 1 times weekly for 12 weeks to include the following interventions: Neuromuscular Re-ed, Patient Education, Self Care, Therapeutic Activities, and Therapeutic Exercise.     Hilton Palacios, OT

## 2024-02-21 ENCOUNTER — CLINICAL SUPPORT (OUTPATIENT)
Dept: REHABILITATION | Facility: HOSPITAL | Age: 59
End: 2024-02-21
Payer: COMMERCIAL

## 2024-02-21 DIAGNOSIS — F43.29 STRESS AND ADJUSTMENT REACTION: ICD-10-CM

## 2024-02-21 DIAGNOSIS — F43.9 STRESS: Primary | ICD-10-CM

## 2024-02-21 DIAGNOSIS — R53.81 PHYSICAL DECONDITIONING: ICD-10-CM

## 2024-02-21 PROCEDURE — 97110 THERAPEUTIC EXERCISES: CPT

## 2024-02-21 PROCEDURE — 97112 NEUROMUSCULAR REEDUCATION: CPT

## 2024-02-21 PROCEDURE — 97535 SELF CARE MNGMENT TRAINING: CPT

## 2024-02-21 NOTE — PROGRESS NOTES
[facility]  Occupational Therapy Treatment Note - Therapeutic Yoga Progam    Date: 2/21/2024  Name: Teresa Trujillo  Clinic Number: [id number]    Therapy Diagnosis:   Encounter Diagnoses   Name Primary?    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   She was compliant with home exercise program given last session.   Response to previous treatment: felt very  strong the next day when I did my HEP.  Functional change: I have more emotional and physical stability.    Pain: 0/10      OBJECTIVE     Objective Measures updated at progress report unless specified.    Patient Specific Functional Scale:           Activity 5/24/23 8/9/23   9/6/23  11/8/23  12/13/23 1/10/24 2/7/24   anxiety 2       3    4      5      6 5       6   2.exercising 3        4     5       5      5 4       6     3. Pacing myself 3        3      4      5       5 5        5   4.               5.               6.               SCORE       2.6       3.3    4.3  5.0    5.6   4.6    5.6      Total Score = Sum of activity scores / number of activities  Minimum Detectable Change (90% CII) for average score = 2 points  Minimum Detectable Change (90% CI) for single activity score = 3 points         Treatment     Teresa received the treatments listed below:     I      Date 11/29/23 12/13/23 1/3/24   1/10/24 1/24/24 1/31/24 2/7/24 2/21/24   Therapeutic Yoga Exercises /  Neuromuscular Re-education 30 minutes 30 minutes 30 minute 45 minutes  PN 15 minutes 30 minutes 30 minutes 30 min.   Seated Yoga   Twist and forward fold in chair twist and forward fold in chair Modified boat and bound angle flow, Modified boat and bound angle flow, Modified boat and bound angle flow,      Quadruped Low lung in sun sal. Low lunge in sun sal. Low lunge in sun sal. Low lunge in sun sal.       Supine Core thinker x2, bridge, twist x 1,  Core thinker x2, bridge, twist x 1, Core thinker x2, bridge, twist x 1, Core thinker x2, bridge, twist x 1 bridge, twist x 1 Bound angle, happy baby flow, ,bound angle, happy baby flow, figure 4,twist x 2, Shoulder release with bolster overhead, IR stretch in supine with hand behind back,   Prone  Sphinx and sphinx plank x 2  Cobra x a  Cobra, sphinx     standing Sun salutations with low lunge, plank, down dog, cobra, warrior 2, side angle, forward fold with twist, Sun salutations with low lunge, plank, down dog, cobra, warrior 2, triangle, back bend over bolster/chair x 2,  Sun salutations with low lunge, plank, down dog, cobra, warrior 2, triangle with chair,  Sun salutations with low lunge, plank, down dog, cobra, warrior 2, triangle with chair,   Sun salutations with low lunge, plank, down dog, cobra, warrior 2, triangle with chair, down dog with chair warrior 2, triangle with block, down dog with chair,  warrior 2, triangle with block, down dog with chair, sun salutation,down dog with chair, chair pose                          Self-Care/Home Management  / Therapeutic Activities 15 minutes 15 minutes 15 minutes  45 minutes 15 minutes 15 minutes 15 minutes              Relaxation techniques DB, body scan, DB, body scan, DB, body scan,  DB, viloma on inhale with hands on belly/chest, exhale sigh, body scan on breath meditation, body scan on whole body in sit and supine. DB, viloma on inhale with hands on belly/chest, exhale sigh, body scan on breath meditation, body  scan on whole body in sit and supine. DB, viloma on inhale with hands on belly/chest, exhale sigh, body scan on breath  DB, viloma on inhale with hands on belly/chest, exhale sigh, body scan on breath    Restorative  Supine with calves on chair and bolster under hips supine with calves on chair and bolster under hips supine with calves on chair and bolster under hips  Bridge and bound angle with bolsters Bridge and bound angle with bolsters Supine in bound angle on 2 bolsters    Activity Pacing Reviewed including breaks in her day for brief rest to avoid exhaustion and fatigue which lead to stress.                                 Stress Management/Education  - physiology of yoga/meditation and immune health - physiology of yoga/meditation and immune health - physiology of yoga/meditation and immune health - physiology of yoga/meditation and immune health - physiology of yoga/meditation and immune health - physiology of yoga/meditation and immune health  - physiology of yoga/meditation and immune health                  Patient Education and Home Exercises      Education provided:   - - physiology of yoga/meditation and immune health   - Progress towards goals     Written Home Exercises Provided: yes.  Exercises were reviewed and Teresa was able to demonstrate them prior to the end of the session.  Teresa demonstrated good  understanding of the HEP provided. See EMR under Patient Instructions for exercises provided during therapy sessions.       Assessment       In today's session, Teresa practiced her yoga HEP consisting of strengthening, stretching, and balance yoga exercise.  She also practiced her  breathing and body scan techniques to assist with relaxation/immune health. She tolerated the session well and required moderate (less), verbal and neuromuscular cues in all treatment.  She demonstrated a good understanding of quieting thoughts by using awareness of sensation in the body for relaxation for immune  health. She reports that she continues to practice  2 new stress coping skills including diaphragmatic breathing and body scan.   Teresa tolerated the session well and required less assist for verbal and neuromuscular cues. She continues  attending the yoga class held in the [facility].     PROGRESS UPDATE:Teresa is progressing well towards her goals, see updates to goals.  Her PSFS score decreased 5.6 to 4.6 due to ongoing anxiety related to her diagnosis. She has been practicing her HEP as recommended. She is consistently attending the yoga class here with her caregiver,(). We  identified relaxation apps to assist  Pt prognosis is Good. She identified 2 new stress coping skills including diaphragmatic breathing and body scan.   Pt will continue to benefit from skilled outpatient occupational therapy to address the deficits listed in the problem list on initial evaluation provide pt/family education and to maximize pt's level of independence in the home and community environment. Pt's spiritual, cultural and educational needs considered and pt agreeable to plan of care and goals.    Anticipated barriers to occupational therapy: none    Goals:  Short Term Goals: 6 weeks      Goal # Goal Status   1 Patient will demonstrate independence with diaphragmatic breathing in sit and supine. met   2 Pt. will identify resource for audio body scan to assist with stress management/immune health met   3 Patient will identify 2 new stress coping skills for stress management/immune health. met   4 Patient will identify activity pacing problems.  Patient will then implement new plan for daily activity to increase endurance for ADL's. met      Long Term Goals: 12 weeks      Goal # Goal Status   1 Patient will demonstrate independence with yoga Home Exercise Program to increase strength and endurance for ADL's. Progressing   2 Patient will demonstrate independence with relaxation techniques to manage stress for immune  health. Progressing   3 Patient will verbalize good understanding of stress/immune health relationship.  Progressing   4             PLAN     Plan of care Certification: 2/21/2024 to 3/8/24.    Outpatient Occupational Therapy 1 times weekly for 12 weeks to include the following interventions: Neuromuscular Re-ed, Patient Education, Self Care, Therapeutic Activities, and Therapeutic Exercise.     Hilton Palacios OT

## 2024-02-27 ENCOUNTER — PATIENT MESSAGE (OUTPATIENT)
Dept: ADMINISTRATIVE | Facility: HOSPITAL | Age: 59
End: 2024-02-27
Payer: COMMERCIAL

## 2024-02-28 ENCOUNTER — CLINICAL SUPPORT (OUTPATIENT)
Dept: REHABILITATION | Facility: HOSPITAL | Age: 59
End: 2024-02-28
Payer: COMMERCIAL

## 2024-02-28 DIAGNOSIS — F43.9 STRESS: Primary | ICD-10-CM

## 2024-02-28 DIAGNOSIS — F43.29 STRESS AND ADJUSTMENT REACTION: ICD-10-CM

## 2024-02-28 DIAGNOSIS — R53.81 PHYSICAL DECONDITIONING: ICD-10-CM

## 2024-02-28 PROCEDURE — 97110 THERAPEUTIC EXERCISES: CPT

## 2024-02-28 PROCEDURE — 97535 SELF CARE MNGMENT TRAINING: CPT

## 2024-02-28 PROCEDURE — 97112 NEUROMUSCULAR REEDUCATION: CPT

## 2024-02-28 NOTE — PROGRESS NOTES
[facility]  Occupational Therapy Treatment Note - Therapeutic Yoga Progam    Date: 2/28/2024  Name: Teresa Trujillo  Clinic Number: [id number]    Therapy Diagnosis:   Encounter Diagnoses   Name Primary?    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  I have spring energy.  She was compliant with home exercise program given last session.   Response to previous treatment: I am managing stress much better this week after the relaxation we did.  Functional change: I have more emotional and physical stability.    Pain: 0/10      OBJECTIVE     Objective Measures updated at progress report unless specified.    Patient Specific Functional Scale:           Activity 5/24/23 8/9/23   9/6/23  11/8/23  12/13/23 1/10/24 2/7/24   anxiety 2       3    4      5      6 5       6   2.exercising 3        4     5       5      5 4       6     3. Pacing myself 3        3      4      5       5 5        5   4.               5.               6.               SCORE       2.6       3.3    4.3  5.0    5.6   4.6    5.6      Total Score = Sum of activity scores / number of activities  Minimum Detectable Change (90% CII) for average score = 2 points  Minimum Detectable Change (90% CI) for single activity score = 3 points         Treatment     Teresa received the treatments listed below:     I      Date 11/29/23 12/13/23 1/3/24   1/10/24 1/24/24 1/31/24 2/7/24 2/21/24 2/28/24    Therapeutic Yoga Exercises / Neuromuscular Re-education 30 minutes 30 minutes 30 minute 45 minutes  PN 15 minutes 30 minutes 30 minutes 30 min. 30 min.   Seated Yoga   Twist and forward fold in chair twist and forward fold in chair Modified boat and bound angle flow, Modified boat and bound angle flow, Modified boat and bound angle flow,       Quadruped Low lung in sun sal. Low lunge in sun sal. Low lunge in sun sal. Low lunge in sun sal.        Supine Core thinker x2, bridge, twist x 1,  Core thinker x2, bridge, twist x 1, Core thinker x2, bridge, twist x 1, Core thinker x2, bridge, twist x 1 bridge, twist x 1 Bound angle, happy baby flow, ,bound angle, happy baby flow, figure 4,twist x 2, Shoulder release with bolster overhead, IR stretch in supine with hand behind back, IR stretch in supine with hand behind back,modified boat with hands behind head-3x5, bridge x 2, twist x 2, core thinker, figure 4, happy baby flow,   Prone  Sphinx and sphinx plank x 2  Cobra x a  Cobra, sphinx   Plank to puppy x 3   standing Sun salutations with low lunge, plank, down dog, cobra, warrior 2, side angle, forward fold with twist, Sun salutations with low lunge, plank, down dog, cobra, warrior 2, triangle, back bend over bolster/chair x 2,  Sun salutations with low lunge, plank, down dog, cobra, warrior 2, triangle with chair,  Sun salutations with low lunge, plank, down dog, cobra, warrior 2, triangle with chair,   Sun salutations with low lunge, plank, down dog, cobra, warrior 2, triangle with chair, down dog with chair warrior 2, triangle with block, down dog with chair,  warrior 2, triangle with block, down dog with chair, sun salutation,down dog with chair, chair pose  warrior 2, triangle with block, down dog with chair, wide straddle forward fold with twist,                           Self-Care/Home Management  / Therapeutic Activities 15 minutes 15 minutes 15 minutes  45 minutes 15 minutes 15 minutes 15 minutes 15 min                Relaxation techniques DB, body scan, DB, body scan, DB, body scan,  DB, viloma on inhale with hands on belly/chest, exhale sigh, body scan on breath meditation, body scan on whole body in sit and supine. DB, viloma on inhale with hands on belly/chest, exhale sigh, body scan on breath meditation, body scan on whole body in sit and supine. DB, viloma on inhale with hands on belly/chest, exhale sigh, body scan on breath  DB, viloma on inhale with hands on belly/chest, exhale sigh, body scan on breath  DB, viloma on inhale with hands on belly/chest, exhale sigh, body scan on breath    Restorative  Supine with calves on chair and bolster under hips supine with calves on chair and bolster under hips supine with calves on chair and bolster under hips  Bridge and bound angle with bolsters Bridge and bound angle with bolsters Supine in bound angle on 2 bolsters  Legs up wall with focus on bound angle to increse hip abd for sitting on floor.   Activity Pacing Reviewed including breaks in her day for brief rest to avoid exhaustion and fatigue which lead to stress.                                    Stress Management/Education  - physiology of yoga/meditation and immune health - physiology of yoga/meditation and immune health - physiology of yoga/meditation and immune health - physiology of yoga/meditation and immune health - physiology of yoga/meditation and immune health - physiology of yoga/meditation and immune health  - physiology of yoga/meditation and immune health                    Patient Education and Home Exercises      Education provided:   - - physiology of yoga/meditation and immune health   - Progress towards goals     Written Home Exercises Provided: yes.  Exercises were reviewed and Teresa was able to demonstrate them prior to the end of the session.  Teresa demonstrated good  understanding of the HEP provided. See EMR under Patient Instructions for exercises provided during therapy sessions.        Assessment       In today's session, Teresa practiced her yoga HEP consisting of strengthening, stretching, and balance yoga exercise.  She also practiced her  breathing and body scan techniques to assist with relaxation/immune health. She tolerated the session well and required moderate (less), verbal and neuromuscular cues in all treatment.  She demonstrated a good understanding of quieting thoughts by using awareness of sensation in the body for relaxation for immune health. She reports that she continues to practice  2 new stress coping skills including diaphragmatic breathing and body scan.   Teresa tolerated the session well and required less assist for verbal and neuromuscular cues. She continues  attending the yoga class held in the [facility].     PROGRESS UPDATE:Teresa is progressing well towards her goals, see updates to goals.  Her PSFS score decreased 5.6 to 4.6 due to ongoing anxiety related to her diagnosis. She has been practicing her HEP as recommended. She is consistently attending the yoga class here with her caregiver,(). We  identified relaxation apps to assist  Pt prognosis is Good. She identified 2 new stress coping skills including diaphragmatic breathing and body scan.   Pt will continue to benefit from skilled outpatient occupational therapy to address the deficits listed in the problem list on initial evaluation provide pt/family education and to maximize pt's level of independence in the home and community environment. Pt's spiritual, cultural and educational needs considered and pt agreeable to plan of care and goals.    Anticipated barriers to occupational therapy: none    Goals:  Short Term Goals: 6 weeks      Goal # Goal Status   1 Patient will demonstrate independence with diaphragmatic breathing in sit and supine. met   2 Pt. will identify resource for audio body scan to assist with stress management/immune health met   3 Patient will identify 2 new stress coping  skills for stress management/immune health. met   4 Patient will identify activity pacing problems.  Patient will then implement new plan for daily activity to increase endurance for ADL's. met      Long Term Goals: 12 weeks      Goal # Goal Status   1 Patient will demonstrate independence with yoga Home Exercise Program to increase strength and endurance for ADL's. Progressing   2 Patient will demonstrate independence with relaxation techniques to manage stress for immune health. Progressing   3 Patient will verbalize good understanding of stress/immune health relationship.  met   4             PLAN     Plan of care Certification: 2/28/2024 to 3/8/24.    Outpatient Occupational Therapy 1 times weekly for 12 weeks to include the following interventions: Neuromuscular Re-ed, Patient Education, Self Care, Therapeutic Activities, and Therapeutic Exercise.     Hilton Palacios OT

## 2024-03-13 ENCOUNTER — CLINICAL SUPPORT (OUTPATIENT)
Dept: REHABILITATION | Facility: HOSPITAL | Age: 59
End: 2024-03-13
Payer: COMMERCIAL

## 2024-03-13 DIAGNOSIS — F43.29 STRESS AND ADJUSTMENT REACTION: ICD-10-CM

## 2024-03-13 DIAGNOSIS — F43.9 STRESS: Primary | ICD-10-CM

## 2024-03-13 DIAGNOSIS — R53.81 PHYSICAL DECONDITIONING: ICD-10-CM

## 2024-03-13 PROCEDURE — 97535 SELF CARE MNGMENT TRAINING: CPT

## 2024-03-13 PROCEDURE — 97112 NEUROMUSCULAR REEDUCATION: CPT

## 2024-03-13 PROCEDURE — 97110 THERAPEUTIC EXERCISES: CPT

## 2024-03-13 NOTE — PROGRESS NOTES
[facility]  Occupational Therapy Progress and Treatment Note - Therapeutic Yoga Progam    Date: 2/28/2024  Name: Teresa Trujillo  Clinic Number: [id number]    Therapy Diagnosis:   Encounter Diagnoses   Name Primary?    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  She was compliant with home exercise program given last session.   Response to previous treatment: I used the relaxation before I got stressed and it worked better when I felt stressed.   Functional change: I have more emotional and physical stability.    Pain: 0/10      OBJECTIVE     Objective Measures updated at progress report unless specified.    Patient Specific Functional Scale:           Activity 5/24/23 8/9/23   9/6/23  11/8/23  12/13/23 1/10/24 2/7/24 3/13/24   anxiety 2       3    4      5      6 5       6    8   2.exercising 3        4     5       5      5 4       6       7   3. Pacing myself 3        3      4      5       5 5        5      5   4.                5.                6.                SCORE       2.6       3.3    4.3  5.0    5.6   4.6    5.6    6.6      Total Score = Sum of activity scores / number of activities  Minimum Detectable Change (90% CII) for average score = 2 points  Minimum Detectable Change (90% CI) for single activity score = 3 points       Treatment     Teresa received the treatments listed below:      Date   1/10/24 1/24/24 1/31/24 2/7/24 2/21/24 2/28/24 3/13/24   Therapeutic Yoga Exercises / Neuromuscular Re-education 45 minutes  PN 15 minutes 30 minutes 30 minutes 30 min. 30 min. 30 min.   Seated Yoga   Modified boat and bound angle flow, Modified boat and bound angle flow,     Modified boat and bound angle flow,   Quadruped Low lunge in sun sal.         Supine Core thinker x2, bridge, twist x 1 bridge, twist x 1 Bound angle, happy baby flow, ,bound angle, happy baby flow, figure 4,twist x 2, Shoulder release with bolster overhead, IR stretch in supine with hand behind back, IR stretch in supine with hand behind back,modified boat with hands behind head-3x5, bridge x 2, twist x 2, core thinker, figure 4, happy baby flow, R stretch in supine with hand behind back,modified boat with hands behind head-3x5, bridge x 2, twist x 2, core thinker, figure 4, happy baby flow,   Prone Cobra x a  Cobra, sphinx   Plank to puppy x 3    standing Sun salutations with low lunge, plank, down dog, cobra, warrior 2, triangle with chair,   Sun salutations with low lunge, plank, down dog, cobra, warrior 2, triangle with chair, down dog with chair warrior 2, triangle with block, down dog with chair,  warrior 2, triangle with block, down dog with chair, sun salutation,down dog with chair, chair pose  warrior 2, triangle with block, down dog with chair, wide straddle forward fold with twist, Sun salutations with low lunge, plank, down dog, cobra, warrior 2, triangle with chair, down dog with chair, forward fold with hips on wall,twist with chair,                       Self-Care/Home Management  / Therapeutic Activities  45 minutes 15 minutes 15 minutes 15 minutes 15 min 15 minutes             Relaxation techniques  DB, viloma on inhale with hands on belly/chest, exhale sigh, body scan on breath meditation, body scan on whole body in sit and supine. DB, viloma on inhale with hands on belly/chest, exhale sigh, body scan on breath  meditation, body scan on whole body in sit and supine. DB, viloma on inhale with hands on belly/chest, exhale sigh, body scan on breath  DB, viloma on inhale with hands on belly/chest, exhale sigh, body scan on breath  DB, viloma on inhale with hands on belly/chest, exhale sigh, body scan on breath  DB,body scan,   Restorative   Bridge and bound angle with bolsters Bridge and bound angle with bolsters Supine in bound angle on 2 bolsters  Legs up wall with focus on bound angle to increse hip abd for sitting on floor. Legs up wall with focus on bound angle to increse hip abd for sitting on    Activity Pacing                              Stress Management/Education  - physiology of yoga/meditation and immune health - physiology of yoga/meditation and immune health - physiology of yoga/meditation and immune health  - physiology of yoga/meditation and immune health  - physiology of yoga/meditation and immune health                 Patient Education and Home Exercises      Education provided:   - - physiology of yoga/meditation and immune health   - Progress towards goals     Written Home Exercises Provided: yes.  Exercises were reviewed and Teresa was able to demonstrate them prior to the end of the session.  Teresa demonstrated good  understanding of the HEP provided. See EMR under Patient Instructions for exercises provided during therapy sessions.       Assessment       In today's session, Teresa practiced her yoga HEP consisting of strengthening, stretching, and balance yoga exercise.  She also practiced her  breathing and body scan techniques to assist with relaxation/immune health. She tolerated the session well and required moderate (less), verbal and neuromuscular cues in all treatment.  She demonstrated a good understanding of quieting thoughts by using awareness of sensation in the body for relaxation for immune health. She reports that she continues to practice  2 new stress coping skills including  diaphragmatic breathing and body scan.   Teresa tolerated the session well and required less assist for verbal and neuromuscular cues. She continues  attending the yoga class held in the [facility].     PROGRESS UPDATE:Teresa is progressing well towards her goals, see updates to goals.  Her PSFS score increased from 5.6 to 6.6 indicating good functional progress.  She has been practicing her HEP as recommended. She is consistently attending the yoga class here with her caregiver,(). We  identified relaxation apps to assist  Pt prognosis is Good. She identified 2 new stress coping skills including diaphragmatic breathing and body scan.   Pt will continue to benefit from skilled outpatient occupational therapy to address the deficits listed in the problem list on initial evaluation provide pt/family education and to maximize pt's level of independence in the home and community environment. Pt's spiritual, cultural and educational needs considered and pt agreeable to plan of care and goals.    Anticipated barriers to occupational therapy: none    Goals:  Short Term Goals: 6 weeks      Goal # Goal Status   1 Patient will demonstrate independence with diaphragmatic breathing in sit and supine. met   2 Pt. will identify resource for audio body scan to assist with stress management/immune health met   3 Patient will identify 2 new stress coping skills for stress management/immune health. met   4 Patient will identify activity pacing problems.  Patient will then implement new plan for daily activity to increase endurance for ADL's. met      Long Term Goals: 12 weeks      Goal # Goal Status   1 Patient will demonstrate independence with yoga Home Exercise Program to increase strength and endurance for ADL's. Progressing   2 Patient will demonstrate independence with relaxation techniques to manage stress for immune health. Progressing   3 Patient will verbalize good understanding of stress/immune health  relationship.  met   4             PLAN     Plan of care Certification: 2/28/2024 to 9/13/24.    Outpatient Occupational Therapy 1 times weekly for 12 weeks to include the following interventions: Neuromuscular Re-ed, Patient Education, Self Care, Therapeutic Activities, and Therapeutic Exercise.     Hilton Palacios OT

## 2024-03-13 NOTE — PLAN OF CARE
Outpatient Therapy Updated Plan of Care     Visit Date: 3/13/2024  Name: Teresa Trujillo  Clinic Number: [id number]    Therapy Diagnosis:   Encounter Diagnoses   Name Primary?    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  She was compliant with home exercise program given last session.   Response to previous treatment: I used the relaxation before I got stressed and it worked better when I felt stressed.   Functional change: I have more emotional and physical stability.     Pain: 0/10       Objective     Patient Specific Functional Scale:         Assessment     Update:      Activity 5/24/23 8/9/23   9/6/23  11/8/23  12/13/23 1/10/24 2/7/24 3/13/24   anxiety 2       3    4      5      6 5       6    8   2.exercising 3        4     5       5      5 4       6       7   3. Pacing myself 3        3      4      5       5 5        5      5   4.                   5.                   6.                   SCORE       2.6       3.3    4.3  5.0    5.6   4.6    5.6    6.6      Total Score = Sum of activity scores / number of activities  Minimum Detectable Change (90% CII) for average score = 2 points  Minimum Detectable Change (90% CI) for single activ    GOALS:  Short Term Goals: 6 weeks      Goal # Goal Status   1 Patient will demonstrate independence with diaphragmatic breathing in sit and supine. met   2 Pt.  will identify resource for audio body scan to assist with stress management/immune health met   3 Patient will identify 2 new stress coping skills for stress management/immune health. met   4 Patient will identify activity pacing problems.  Patient will then implement new plan for daily activity to increase endurance for ADL's. met      Long Term Goals: 12 weeks      Goal # Goal Status   1 Patient will demonstrate independence with yoga Home Exercise Program to increase strength and endurance for ADL's. Progressing   2 Patient will demonstrate independence with relaxation techniques to manage stress for immune health. Progressing   3 Patient will verbalize good understanding of stress/immune health relationship.  met   4              Plan     Updated Certification Period: 3/13/2024 to 9/13/24  Recommended Treatment Plan: 1 times per week for 1 weeks: Neuromuscular Re-ed, Patient Education, Self Care, Therapeutic Activities, and Therapeutic Exercise    Hilton Palacios OT  3/13/2024      I CERTIFY THE NEED FOR THESE SERVICES FURNISHED UNDER THIS PLAN OF TREATMENT AND WHILE UNDER MY CARE    Physician's comments:        Physician's Signature: ___________________________________________________

## 2024-04-24 ENCOUNTER — CLINICAL SUPPORT (OUTPATIENT)
Dept: REHABILITATION | Facility: HOSPITAL | Age: 59
End: 2024-04-24
Payer: COMMERCIAL

## 2024-04-24 DIAGNOSIS — F43.29 STRESS AND ADJUSTMENT REACTION: ICD-10-CM

## 2024-04-24 DIAGNOSIS — F43.9 STRESS: Primary | ICD-10-CM

## 2024-04-24 DIAGNOSIS — R53.81 PHYSICAL DECONDITIONING: ICD-10-CM

## 2024-04-24 PROCEDURE — 97535 SELF CARE MNGMENT TRAINING: CPT

## 2024-04-24 PROCEDURE — 97112 NEUROMUSCULAR REEDUCATION: CPT

## 2024-04-24 PROCEDURE — 97110 THERAPEUTIC EXERCISES: CPT

## 2024-04-24 NOTE — PROGRESS NOTES
[facility] OUTPATIENT THERAPY AND WELLNESS  Occupational Therapy discharge  and Treatment Note - Therapeutic Yoga Progam    Date: 4/24/2024  Name: Teresa Trujillo  Clinic Number: [id number]    Therapy Diagnosis:   Encounter Diagnoses   Name Primary?    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  She was compliant with home exercise program given last session.   Response to previous treatment: I felt stronger and relaxed.  Functional change: I have more emotional and physical stability and I am sleeping better.    Pain: 0/10      OBJECTIVE     Objective Measures updated at progress report unless specified.    Patient Specific Functional Scale:           Activity 5/24/23 8/9/23   9/6/23  11/8/23  12/13/23 1/10/24 2/7/24 3/13/24 4/24/24   anxiety 2       3    4      5      6 5       6    8 8   2.exercising 3        4     5       5      5 4       6       7 8   3. Pacing myself 3        3      4      5       5 5        5      5 7   4.                 5.                 6.                 SCORE       2.6       3.3    4.3  5.0    5.6   4.6    5.6    6.6    7.6      Total Score = Sum of activity scores / number of activities  Minimum Detectable Change (90% CII) for average score = 2 points  Minimum Detectable Change (90% CI) for single activity score = 3 points       Treatment     Teresa received the treatments listed below:     Date    1/10/24 1/24/24 1/31/24 2/7/24 2/21/24 2/28/24 3/13/24 3/24/24   Therapeutic Yoga Exercises / Neuromuscular Re-education 45 minutes  PN 15 minutes 30 minutes 30 minutes 30 min. 30 min. 30 min. 30 minutes   Seated Yoga   Modified boat and bound angle flow, Modified boat and bound angle flow,     Modified boat and bound angle flow,    Quadruped Low lunge in sun sal.          Supine Core thinker x2, bridge, twist x 1 bridge, twist x 1 Bound angle, happy baby flow, ,bound angle, happy baby flow, figure 4,twist x 2, Shoulder release with bolster overhead, IR stretch in supine with hand behind back, IR stretch in supine with hand behind back,modified boat with hands behind head-3x5, bridge x 2, twist x 2, core thinker, figure 4, happy baby flow, R stretch in supine with hand behind back,modified boat with hands behind head-3x5, bridge x 2, twist x 2, core thinker, figure 4, happy baby flow, modified boat with hands behind head-3x5, bridge x 2, twist x 2, core thinker, figure 4, happy baby flow, shoulder stretch using yoga belt, bridge, and block supported bridge   Prone Cobra x a  Cobra, sphinx   Plank to puppy x 3  lank to puppy    standing Sun salutations with low lunge, plank, down dog, cobra, warrior 2, triangle with chair,   Sun salutations with low lunge, plank, down dog, cobra, warrior 2, triangle with chair, down dog with chair warrior 2, triangle with block, down dog with chair,  warrior 2, triangle with block, down dog with chair, sun salutation,down dog with chair, chair pose  warrior 2, triangle with block, down dog with chair, wide straddle forward fold with twist, Sun salutations with low lunge, plank, down dog, cobra, warrior 2, triangle with chair, down dog with chair, forward fold with hips on wall,twist with chair, lank, down dog, cobra, warrior 2, triangle with chair, down dog with chair,                          Self-Care/Home Management  / Therapeutic Activities  45 minutes 15 minutes 15 minutes 15  minutes 15 min 15 minutes               Relaxation techniques  DB, viloma on inhale with hands on belly/chest, exhale sigh, body scan on breath meditation, body scan on whole body in sit and supine. DB, viloma on inhale with hands on belly/chest, exhale sigh, body scan on breath meditation, body scan on whole body in sit and supine. DB, viloma on inhale with hands on belly/chest, exhale sigh, body scan on breath  DB, viloma on inhale with hands on belly/chest, exhale sigh, body scan on breath  DB, viloma on inhale with hands on belly/chest, exhale sigh, body scan on breath  DB,body scan, B, viloma on inhale,  body scan on breath    Restorative   Bridge and bound angle with bolsters Bridge and bound angle with bolsters Supine in bound angle on 2 bolsters  Legs up wall with focus on bound angle to increse hip abd for sitting on floor. Legs up wall with focus on bound angle to increse hip abd for sitting on  Legs up wall with focus on bound angle to increse hip abd for sitting on    Activity Pacing                                 Stress Management/Education  - physiology of yoga/meditation and immune health - physiology of yoga/meditation and immune health - physiology of yoga/meditation and immune health  - physiology of yoga/meditation and immune health  - physiology of yoga/meditation and immune health - physiology of yoga/meditation and immune health                  Patient Education and Home Exercises      Education provided:   - - physiology of yoga/meditation and immune health   - Progress towards goals     Written Home Exercises Provided: yes.  Exercises were reviewed and Teresa was able to demonstrate them prior to the end of the session.  Teresa demonstrated good  understanding of the HEP provided. See EMR under Patient Instructions for exercises provided during therapy sessions.       Assessment       In today's DC session, Teresa practiced her yoga HEP consisting of strengthening, stretching, and balance  yoga exercise.  She also practiced her  breathing and body scan techniques to assist with relaxation/immune health. She tolerated the session well and required moderate (less), verbal and neuromuscular cues in all treatment.  She demonstrated a good understanding of quieting thoughts by using awareness of sensation in the body for relaxation for immune health. She has a plan to attend yoga class at the cancer center and the fitness center.    Teresa tolerated the session well and required less assist for verbal and neuromuscular cues. She continues  attending the yoga class held in the [facility].     PROGRESS UPDATE:Teresa is progressing well towards her goals, see updates to goals.  Her PSFS score increased from 6.6 to 7.6  indicating good functional progress.  She has been practicing her HEP as recommended. She is consistently attending the yoga class here with her caregiver,(). We  identified relaxation apps to assist  She has met all of her OT goals and is ready for DC.    Anticipated barriers to occupational therapy: none    Goals:  Short Term Goals: 6 weeks      Goal # Goal Status   1 Patient will demonstrate independence with diaphragmatic breathing in sit and supine. met   2 Pt. will identify resource for audio body scan to assist with stress management/immune health met   3 Patient will identify 2 new stress coping skills for stress management/immune health. met   4 Patient will identify activity pacing problems.  Patient will then implement new plan for daily activity to increase endurance for ADL's. met      Long Term Goals: 12 weeks      Goal # Goal Status   1 Patient will demonstrate independence with yoga Home Exercise Program to increase strength and endurance for ADL's. met   2 Patient will demonstrate independence with relaxation techniques to manage stress for immune health. met   3 Patient will verbalize good understanding of stress/immune health relationship.  met   4              PLAN     Plan of care Certification: 4/24/2024 to 9/13/24.    Outpatient Occupational Therapy 1 times weekly for 12 weeks to include the following interventions: Neuromuscular Re-ed, Patient Education, Self Care, Therapeutic Activities, and Therapeutic Exercise.     Hilton Palacios OT

## 2024-05-15 DIAGNOSIS — E11.9 TYPE 2 DIABETES MELLITUS WITHOUT COMPLICATION, UNSPECIFIED WHETHER LONG TERM INSULIN USE: ICD-10-CM

## 2024-05-27 DIAGNOSIS — I10 ESSENTIAL HYPERTENSION: ICD-10-CM

## 2024-05-27 NOTE — TELEPHONE ENCOUNTER
Care Due:                  Date            Visit Type   Department     Provider  --------------------------------------------------------------------------------                                EP -                              PRIMARY      [facility] INTERNAL  Last Visit: 05-      CARE ([facility])   MEDICINE       SHANON ROLDAN  Next Visit: None Scheduled  None         None Found                                                            Last  Test          Frequency    Reason                     Performed    Due Date  --------------------------------------------------------------------------------    Office Visit  15 months..  levocetirizine,            05- 08-                             lisinopriL...............    CMP.........  12 months..  lisinopriL...............  01- 01-    [facility] Embedded Care Due Messages. Reference number: [id number].   5/27/2024 11:37:16 AM CDT

## 2024-05-27 NOTE — TELEPHONE ENCOUNTER
Refill Routing Note   Medication(s) are not appropriate for processing by [facility] for the following reason(s):        Required labs outdated    [facility] action(s):  Defer   Requires appointment : Yes     Requires labs : Yes             Appointments  past 12m or future 3m with PCP    Date Provider   Last Visit   5/29/2023 Keith Freeman MD   Next Visit   Visit date not found Keith Freeman MD   ED visits in past 90 days: 0        Note composed:6:00 PM 05/27/2024

## 2024-05-28 ENCOUNTER — PATIENT MESSAGE (OUTPATIENT)
Dept: ADMINISTRATIVE | Facility: HOSPITAL | Age: 59
End: 2024-05-28
Payer: COMMERCIAL

## 2024-05-28 RX ORDER — LISINOPRIL 20 MG/1
20 TABLET ORAL
Qty: 90 TABLET | Refills: 0 | Status: SHIPPED | OUTPATIENT
Start: 2024-05-28

## 2024-06-04 ENCOUNTER — TELEPHONE (OUTPATIENT)
Dept: INTERNAL MEDICINE | Facility: CLINIC | Age: 59
End: 2024-06-04
Payer: COMMERCIAL

## 2024-06-04 VITALS — DIASTOLIC BLOOD PRESSURE: 88 MMHG | SYSTOLIC BLOOD PRESSURE: 134 MMHG

## 2024-06-10 ENCOUNTER — PATIENT MESSAGE (OUTPATIENT)
Dept: INTERNAL MEDICINE | Facility: CLINIC | Age: 59
End: 2024-06-10
Payer: COMMERCIAL

## 2024-07-05 ENCOUNTER — PATIENT OUTREACH (OUTPATIENT)
Dept: ADMINISTRATIVE | Facility: HOSPITAL | Age: 59
End: 2024-07-05
Payer: COMMERCIAL

## 2024-07-05 NOTE — PROGRESS NOTES
Health Maintenance Due   Topic Date Due    Pneumococcal Vaccines (Age 0-64) (1 of 2 - PCV) Never done    Foot Exam  Never done    TETANUS VACCINE  Never done    Shingles Vaccine (1 of 2) Never done    Eye Exam  03/17/2023    Diabetes Urine Screening  06/22/2023    Colorectal Cancer Screening  06/27/2023    COVID-19 Vaccine (6 - 2023-24 season) 09/01/2023    Hemoglobin A1c  11/29/2023    Mammogram  09/08/2024      Chart reviewed. Triggered LINKS. Updated Care Everywhere.     Sean Perla CMA  Population Health Care Coordinator  Primary Care Team

## 2024-07-19 ENCOUNTER — OFFICE VISIT (OUTPATIENT)
Dept: INTERNAL MEDICINE | Facility: CLINIC | Age: 59
End: 2024-07-19
Payer: COMMERCIAL

## 2024-07-19 ENCOUNTER — LAB VISIT (OUTPATIENT)
Dept: LAB | Facility: HOSPITAL | Age: 59
End: 2024-07-19
Attending: INTERNAL MEDICINE
Payer: COMMERCIAL

## 2024-07-19 VITALS
HEART RATE: 82 BPM | SYSTOLIC BLOOD PRESSURE: 116 MMHG | DIASTOLIC BLOOD PRESSURE: 80 MMHG | WEIGHT: 161.81 LBS | OXYGEN SATURATION: 97 % | BODY MASS INDEX: 23.97 KG/M2 | HEIGHT: 69 IN

## 2024-07-19 DIAGNOSIS — E11.9 TYPE 2 DIABETES MELLITUS WITHOUT COMPLICATION, WITHOUT LONG-TERM CURRENT USE OF INSULIN: ICD-10-CM

## 2024-07-19 DIAGNOSIS — Z00.00 ANNUAL PHYSICAL EXAM: Primary | ICD-10-CM

## 2024-07-19 DIAGNOSIS — Z00.00 ANNUAL PHYSICAL EXAM: ICD-10-CM

## 2024-07-19 DIAGNOSIS — I10 ESSENTIAL HYPERTENSION: ICD-10-CM

## 2024-07-19 DIAGNOSIS — E78.2 MIXED HYPERLIPIDEMIA: ICD-10-CM

## 2024-07-19 DIAGNOSIS — G43.009 MIGRAINE WITHOUT AURA AND WITHOUT STATUS MIGRAINOSUS, NOT INTRACTABLE: ICD-10-CM

## 2024-07-19 DIAGNOSIS — D3A.010 CARCINOID TUMOR OF DUODENUM, UNSPECIFIED WHETHER MALIGNANT: ICD-10-CM

## 2024-07-19 LAB
ALBUMIN SERPL BCP-MCNC: 4.2 G/DL (ref 3.5–5.2)
ALP SERPL-CCNC: 102 U/L (ref 55–135)
ALT SERPL W/O P-5'-P-CCNC: 33 U/L (ref 10–44)
ANION GAP SERPL CALC-SCNC: 10 MMOL/L (ref 8–16)
AST SERPL-CCNC: 38 U/L (ref 10–40)
BASOPHILS # BLD AUTO: 0.03 K/UL (ref 0–0.2)
BASOPHILS NFR BLD: 0.9 % (ref 0–1.9)
BILIRUB SERPL-MCNC: 0.8 MG/DL (ref 0.1–1)
BUN SERPL-MCNC: 11 MG/DL (ref 6–20)
CALCIUM SERPL-MCNC: 10 MG/DL (ref 8.7–10.5)
CHLORIDE SERPL-SCNC: 104 MMOL/L (ref 95–110)
CHOLEST SERPL-MCNC: 174 MG/DL (ref 120–199)
CHOLEST/HDLC SERPL: 2.9 {RATIO} (ref 2–5)
CO2 SERPL-SCNC: 24 MMOL/L (ref 23–29)
CREAT SERPL-MCNC: 0.8 MG/DL (ref 0.5–1.4)
DIFFERENTIAL METHOD BLD: ABNORMAL
EOSINOPHIL # BLD AUTO: 0 K/UL (ref 0–0.5)
EOSINOPHIL NFR BLD: 1.1 % (ref 0–8)
ERYTHROCYTE [DISTWIDTH] IN BLOOD BY AUTOMATED COUNT: 13.2 % (ref 11.5–14.5)
EST. GFR  (NO RACE VARIABLE): >60 ML/MIN/1.73 M^2
ESTIMATED AVG GLUCOSE: 206 MG/DL (ref 68–131)
GLUCOSE SERPL-MCNC: 190 MG/DL (ref 70–110)
HBA1C MFR BLD: 8.8 % (ref 4–5.6)
HCT VFR BLD AUTO: 39.3 % (ref 37–48.5)
HDLC SERPL-MCNC: 60 MG/DL (ref 40–75)
HDLC SERPL: 34.5 % (ref 20–50)
HGB BLD-MCNC: 12.7 G/DL (ref 12–16)
IMM GRANULOCYTES # BLD AUTO: 0.01 K/UL (ref 0–0.04)
IMM GRANULOCYTES NFR BLD AUTO: 0.3 % (ref 0–0.5)
LDLC SERPL CALC-MCNC: 96 MG/DL (ref 63–159)
LYMPHOCYTES # BLD AUTO: 1.7 K/UL (ref 1–4.8)
LYMPHOCYTES NFR BLD: 48.4 % (ref 18–48)
MCH RBC QN AUTO: 32 PG (ref 27–31)
MCHC RBC AUTO-ENTMCNC: 32.3 G/DL (ref 32–36)
MCV RBC AUTO: 99 FL (ref 82–98)
MONOCYTES # BLD AUTO: 0.5 K/UL (ref 0.3–1)
MONOCYTES NFR BLD: 14 % (ref 4–15)
NEUTROPHILS # BLD AUTO: 1.2 K/UL (ref 1.8–7.7)
NEUTROPHILS NFR BLD: 35.3 % (ref 38–73)
NONHDLC SERPL-MCNC: 114 MG/DL
NRBC BLD-RTO: 0 /100 WBC
PLATELET # BLD AUTO: 163 K/UL (ref 150–450)
PMV BLD AUTO: 13.2 FL (ref 9.2–12.9)
POTASSIUM SERPL-SCNC: 4.2 MMOL/L (ref 3.5–5.1)
PROT SERPL-MCNC: 7.5 G/DL (ref 6–8.4)
RBC # BLD AUTO: 3.97 M/UL (ref 4–5.4)
SODIUM SERPL-SCNC: 138 MMOL/L (ref 136–145)
TRIGL SERPL-MCNC: 90 MG/DL (ref 30–150)
WBC # BLD AUTO: 3.51 K/UL (ref 3.9–12.7)

## 2024-07-19 PROCEDURE — 99396 PREV VISIT EST AGE 40-64: CPT | Mod: S$GLB,,, | Performed by: INTERNAL MEDICINE

## 2024-07-19 PROCEDURE — 4010F ACE/ARB THERAPY RXD/TAKEN: CPT | Mod: CPTII,S$GLB,, | Performed by: INTERNAL MEDICINE

## 2024-07-19 PROCEDURE — 3074F SYST BP LT 130 MM HG: CPT | Mod: CPTII,S$GLB,, | Performed by: INTERNAL MEDICINE

## 2024-07-19 PROCEDURE — 99999 PR PBB SHADOW E&M-EST. PATIENT-LVL IV: CPT | Mod: PBBFAC,,, | Performed by: INTERNAL MEDICINE

## 2024-07-19 PROCEDURE — 80061 LIPID PANEL: CPT | Performed by: INTERNAL MEDICINE

## 2024-07-19 PROCEDURE — 3079F DIAST BP 80-89 MM HG: CPT | Mod: CPTII,S$GLB,, | Performed by: INTERNAL MEDICINE

## 2024-07-19 PROCEDURE — 83036 HEMOGLOBIN GLYCOSYLATED A1C: CPT | Performed by: INTERNAL MEDICINE

## 2024-07-19 PROCEDURE — 80053 COMPREHEN METABOLIC PANEL: CPT | Performed by: INTERNAL MEDICINE

## 2024-07-19 PROCEDURE — 36415 COLL VENOUS BLD VENIPUNCTURE: CPT | Performed by: INTERNAL MEDICINE

## 2024-07-19 PROCEDURE — 3008F BODY MASS INDEX DOCD: CPT | Mod: CPTII,S$GLB,, | Performed by: INTERNAL MEDICINE

## 2024-07-19 PROCEDURE — 85025 COMPLETE CBC W/AUTO DIFF WBC: CPT | Performed by: INTERNAL MEDICINE

## 2024-07-19 PROCEDURE — 1159F MED LIST DOCD IN RCRD: CPT | Mod: CPTII,S$GLB,, | Performed by: INTERNAL MEDICINE

## 2024-07-19 PROCEDURE — 3052F HG A1C>EQUAL 8.0%<EQUAL 9.0%: CPT | Mod: CPTII,S$GLB,, | Performed by: INTERNAL MEDICINE

## 2024-07-19 RX ORDER — LANCETS 33 GAUGE
EACH MISCELLANEOUS
Qty: 100 EACH | Refills: 11 | Status: SHIPPED | OUTPATIENT
Start: 2024-07-19

## 2024-07-19 RX ORDER — ROSUVASTATIN CALCIUM 5 MG/1
5 TABLET, COATED ORAL DAILY
COMMUNITY

## 2024-07-19 NOTE — PROGRESS NOTES
CC:  Annual exam     HPI:  The patient is a 59-year-old female with non insulin-dependent diabetes, hypertension, hyperlipidemia, depression, migraine headaches and carcinoid tumor of the duodenum who presents today for annual exam.  The patient is being followed by Hematology /Oncology and GI at [facility].  She recently underwent an EGD.    ROS: Patient reports some weight gain.  She had her eyes checked around March or April.  No auditory changes.  She does see a Dr. Nicholson for TMJ.  No chest pain.  No shortness a breath.  She rides her bike for exercise.  No nausea vomiting.  No abdominal pain.  No bladder changes.  No skin changes.  No breast changes.  She was scheduled for a mammogram in August.  She had a well-woman exam in April.  No numbness or tingling arms or legs.  She does report getting a pain in the right anterior thigh on occasion.  Started 2 weeks ago.  Symptoms last about 5 minutes.  It has been happening off and on.  In regards to her diabetes, she was not been checking her blood sugars.  She was out of test strips and lancets.  He was currently on diet alone.    Physical exam:   General appearance: No acute distress  HEENT: Conjunctiva is clear.  Pupils equal.  TMs are clear.  Nasal septum is midline without discharge.  Oropharynx is without erythema.  Trachea is midline without JVD or thyromegaly.  Pulmonary: Good inspiratory, expiratory breath sounds are heard.  Lungs are clear auscultation.  Cardiovascular: S1-S2, rhythm is regular.  2+ carotid pulse of bruits.  Extremities without edema.    GI: Abdomen is nontender, nondistended without hepatosplenomegaly    Assessment:    Annual exam   2.  Two diabetes currently on diet alone   3.  Heart carcinoid tumor of the duodenum  4.  Hypertension   5.  Hyperlipidemia  Plan:    Will order a CBC, CMP, lipid panel, UA, A1c   2.  Further workup will depend on test results.  3.  Test strips and lancets were refilled.

## 2024-07-22 ENCOUNTER — TELEPHONE (OUTPATIENT)
Dept: INTERNAL MEDICINE | Facility: CLINIC | Age: 59
End: 2024-07-22
Payer: COMMERCIAL

## 2024-07-22 NOTE — TELEPHONE ENCOUNTER
----- Message from Keith Freeman MD sent at 7/19/2024  6:19 PM CDT -----    Please contact patient.  Her A1c showed that her blood sugars are averaging a little over 200 overall.  This has up from last year where her blood sugars were averaging around 120.  I would like for her to see Ms. Correia.  Please assist in her getting an appointment.

## 2024-07-23 ENCOUNTER — TELEPHONE (OUTPATIENT)
Dept: INTERNAL MEDICINE | Facility: CLINIC | Age: 59
End: 2024-07-23
Payer: COMMERCIAL

## 2024-07-23 NOTE — TELEPHONE ENCOUNTER
----- Message from Megan Butler sent at 7/23/2024 11:54 AM CDT -----  Contact: [phone]@[email or web address]  Good morning patient would like a call back to discuss getting a NP apt. Please call patient to advise [phone]

## 2024-07-24 DIAGNOSIS — E11.9 TYPE 2 DIABETES MELLITUS WITHOUT COMPLICATION: ICD-10-CM

## 2024-07-26 ENCOUNTER — PATIENT MESSAGE (OUTPATIENT)
Dept: INTERNAL MEDICINE | Facility: CLINIC | Age: 59
End: 2024-07-26
Payer: COMMERCIAL

## 2024-07-26 NOTE — TELEPHONE ENCOUNTER
Pt states her gums are red, spoke with her dentist who states this may be a vit c deficiency. Pt has not previously done any vit c blood work

## 2024-08-03 DIAGNOSIS — E54 VITAMIN C DEFICIENCY: Primary | ICD-10-CM

## 2024-08-06 ENCOUNTER — LAB VISIT (OUTPATIENT)
Dept: LAB | Facility: HOSPITAL | Age: 59
End: 2024-08-06
Attending: INTERNAL MEDICINE
Payer: COMMERCIAL

## 2024-08-06 DIAGNOSIS — E54 VITAMIN C DEFICIENCY: ICD-10-CM

## 2024-08-06 DIAGNOSIS — D3A.010 CARCINOID TUMOR OF DUODENUM, UNSPECIFIED WHETHER MALIGNANT: Primary | ICD-10-CM

## 2024-08-06 PROCEDURE — 36415 COLL VENOUS BLD VENIPUNCTURE: CPT | Performed by: INTERNAL MEDICINE

## 2024-08-06 PROCEDURE — 82180 ASSAY OF ASCORBIC ACID: CPT | Performed by: INTERNAL MEDICINE

## 2024-08-12 ENCOUNTER — DOCUMENTATION ONLY (OUTPATIENT)
Dept: HEMATOLOGY/ONCOLOGY | Facility: CLINIC | Age: 59
End: 2024-08-12
Payer: COMMERCIAL

## 2024-08-12 ENCOUNTER — PATIENT MESSAGE (OUTPATIENT)
Dept: HEMATOLOGY/ONCOLOGY | Facility: CLINIC | Age: 59
End: 2024-08-12
Payer: COMMERCIAL

## 2024-08-12 DIAGNOSIS — C7A.010 MALIGNANT CARCINOID TUMOR OF DUODENUM: Primary | ICD-10-CM

## 2024-08-12 NOTE — PROGRESS NOTES
Called pt to inform her of her upcoming 8/14/24 lab appt, 8/28/24 CT Scan and her 9/4/24 appt with Dr Brown. I reviewed date location and time, provided my direct contact information and encouraged them to call for any assistance.  Pt verbalized understanding.  Oncology Navigation   Intake  Type of Referral: Internal  Date of Referral: 08/06/24  Initial Nurse Navigator Contact: 08/08/24  Referral to Initial Contact Timeline (days): 2  First Appointment Available: 09/04/24  Appointment Date: 09/04/24  First Available Date vs. Scheduled Date (days): 0     Treatment        Medical Oncologist: Kevin  Consult Date: 09/04/24       Procedures: CT  CT Schedule Date: 08/28/24                 Acuity      Follow Up  No follow-ups on file.

## 2024-08-14 ENCOUNTER — OFFICE VISIT (OUTPATIENT)
Dept: PSYCHIATRY | Facility: CLINIC | Age: 59
End: 2024-08-14
Payer: COMMERCIAL

## 2024-08-14 ENCOUNTER — LAB VISIT (OUTPATIENT)
Dept: LAB | Facility: HOSPITAL | Age: 59
End: 2024-08-14
Attending: INTERNAL MEDICINE
Payer: COMMERCIAL

## 2024-08-14 VITALS
HEART RATE: 97 BPM | DIASTOLIC BLOOD PRESSURE: 72 MMHG | WEIGHT: 161.5 LBS | SYSTOLIC BLOOD PRESSURE: 134 MMHG | BODY MASS INDEX: 23.85 KG/M2

## 2024-08-14 DIAGNOSIS — C7A.010 MALIGNANT CARCINOID TUMOR OF DUODENUM: ICD-10-CM

## 2024-08-14 DIAGNOSIS — F33.0 MAJOR DEPRESSIVE DISORDER, RECURRENT, MILD: ICD-10-CM

## 2024-08-14 LAB
ALBUMIN SERPL BCP-MCNC: 4.2 G/DL (ref 3.5–5.2)
ALP SERPL-CCNC: 110 U/L (ref 55–135)
ALT SERPL W/O P-5'-P-CCNC: 29 U/L (ref 10–44)
ANION GAP SERPL CALC-SCNC: 11 MMOL/L (ref 8–16)
ANISOCYTOSIS BLD QL SMEAR: SLIGHT
AST SERPL-CCNC: 30 U/L (ref 10–40)
BASOPHILS # BLD AUTO: 0.02 K/UL (ref 0–0.2)
BASOPHILS NFR BLD: 0.6 % (ref 0–1.9)
BILIRUB SERPL-MCNC: 0.9 MG/DL (ref 0.1–1)
BUN SERPL-MCNC: 8 MG/DL (ref 6–20)
CALCIUM SERPL-MCNC: 10 MG/DL (ref 8.7–10.5)
CHLORIDE SERPL-SCNC: 102 MMOL/L (ref 95–110)
CO2 SERPL-SCNC: 27 MMOL/L (ref 23–29)
CREAT SERPL-MCNC: 0.8 MG/DL (ref 0.5–1.4)
DIFFERENTIAL METHOD BLD: ABNORMAL
EOSINOPHIL # BLD AUTO: 0.1 K/UL (ref 0–0.5)
EOSINOPHIL NFR BLD: 2.3 % (ref 0–8)
ERYTHROCYTE [DISTWIDTH] IN BLOOD BY AUTOMATED COUNT: 13.2 % (ref 11.5–14.5)
EST. GFR  (NO RACE VARIABLE): >60 ML/MIN/1.73 M^2
GLUCOSE SERPL-MCNC: 205 MG/DL (ref 70–110)
HCT VFR BLD AUTO: 38.7 % (ref 37–48.5)
HGB BLD-MCNC: 12.7 G/DL (ref 12–16)
IMM GRANULOCYTES # BLD AUTO: 0 K/UL (ref 0–0.04)
IMM GRANULOCYTES NFR BLD AUTO: 0 % (ref 0–0.5)
LYMPHOCYTES # BLD AUTO: 2.1 K/UL (ref 1–4.8)
LYMPHOCYTES NFR BLD: 59.8 % (ref 18–48)
MCH RBC QN AUTO: 31.8 PG (ref 27–31)
MCHC RBC AUTO-ENTMCNC: 32.8 G/DL (ref 32–36)
MCV RBC AUTO: 97 FL (ref 82–98)
MONOCYTES # BLD AUTO: 0.5 K/UL (ref 0.3–1)
MONOCYTES NFR BLD: 13.3 % (ref 4–15)
NEUTROPHILS # BLD AUTO: 0.9 K/UL (ref 1.8–7.7)
NEUTROPHILS NFR BLD: 24 % (ref 38–73)
NRBC BLD-RTO: 0 /100 WBC
PLATELET # BLD AUTO: 181 K/UL (ref 150–450)
PLATELET BLD QL SMEAR: ABNORMAL
PMV BLD AUTO: 12.3 FL (ref 9.2–12.9)
POLYCHROMASIA BLD QL SMEAR: ABNORMAL
POTASSIUM SERPL-SCNC: 4.1 MMOL/L (ref 3.5–5.1)
PROT SERPL-MCNC: 7.6 G/DL (ref 6–8.4)
RBC # BLD AUTO: 3.99 M/UL (ref 4–5.4)
SODIUM SERPL-SCNC: 140 MMOL/L (ref 136–145)
VIT C SERPL-MCNC: 9 MG/L (ref 2–19)
WBC # BLD AUTO: 3.53 K/UL (ref 3.9–12.7)

## 2024-08-14 PROCEDURE — 3078F DIAST BP <80 MM HG: CPT | Mod: CPTII,S$GLB,, | Performed by: NURSE PRACTITIONER

## 2024-08-14 PROCEDURE — 85025 COMPLETE CBC W/AUTO DIFF WBC: CPT | Performed by: INTERNAL MEDICINE

## 2024-08-14 PROCEDURE — 4010F ACE/ARB THERAPY RXD/TAKEN: CPT | Mod: CPTII,S$GLB,, | Performed by: NURSE PRACTITIONER

## 2024-08-14 PROCEDURE — 3066F NEPHROPATHY DOC TX: CPT | Mod: CPTII,S$GLB,, | Performed by: NURSE PRACTITIONER

## 2024-08-14 PROCEDURE — 84260 ASSAY OF SEROTONIN: CPT | Performed by: INTERNAL MEDICINE

## 2024-08-14 PROCEDURE — 3061F NEG MICROALBUMINURIA REV: CPT | Mod: CPTII,S$GLB,, | Performed by: NURSE PRACTITIONER

## 2024-08-14 PROCEDURE — 83497 ASSAY OF 5-HIAA: CPT | Performed by: INTERNAL MEDICINE

## 2024-08-14 PROCEDURE — 99213 OFFICE O/P EST LOW 20 MIN: CPT | Mod: S$GLB,,, | Performed by: NURSE PRACTITIONER

## 2024-08-14 PROCEDURE — 99999 PR PBB SHADOW E&M-EST. PATIENT-LVL III: CPT | Mod: PBBFAC,,, | Performed by: NURSE PRACTITIONER

## 2024-08-14 PROCEDURE — 83519 RIA NONANTIBODY: CPT | Performed by: INTERNAL MEDICINE

## 2024-08-14 PROCEDURE — 3008F BODY MASS INDEX DOCD: CPT | Mod: CPTII,S$GLB,, | Performed by: NURSE PRACTITIONER

## 2024-08-14 PROCEDURE — 3075F SYST BP GE 130 - 139MM HG: CPT | Mod: CPTII,S$GLB,, | Performed by: NURSE PRACTITIONER

## 2024-08-14 PROCEDURE — 80053 COMPREHEN METABOLIC PANEL: CPT | Performed by: INTERNAL MEDICINE

## 2024-08-14 PROCEDURE — 36415 COLL VENOUS BLD VENIPUNCTURE: CPT | Performed by: INTERNAL MEDICINE

## 2024-08-14 PROCEDURE — 3052F HG A1C>EQUAL 8.0%<EQUAL 9.0%: CPT | Mod: CPTII,S$GLB,, | Performed by: NURSE PRACTITIONER

## 2024-08-14 RX ORDER — VENLAFAXINE HYDROCHLORIDE 150 MG/1
150 CAPSULE, EXTENDED RELEASE ORAL DAILY
Qty: 90 CAPSULE | Refills: 3 | Status: SHIPPED | OUTPATIENT
Start: 2024-08-14

## 2024-08-14 NOTE — PROGRESS NOTES
Outpatient Psychiatry Follow-Up Visit (MD/NP)    8/14/2024    Clinical Status of Patient:  Outpatient (Ambulatory)    Chief Complaint:  Teresa Trujillo is a 59 y.o. female who presents today for follow-up of depression and anxiety.  Met with patient.      Last visit was: 5/15/23. Chart and  reviewed.     Interval History and Content of Current Session:  Current Psychiatric Medications/changes  Increase to Effexor  mg daily    Reports he is doing well and likes current dose of Effexor for mood and anxiety.  Affect appears bright with euthymic mood. Thought processes are linear, clear, and organized. Denies SI/HI/AVH.     Psychotherapy:  Target symptoms: depression, anxiety   Why chosen therapy is appropriate versus another modality: relevant to diagnosis  Outcome monitoring methods: self-report  Therapeutic intervention type: insight oriented psychotherapy  Topics discussed/themes: building skills sets for symptom management, symptom recognition  The patient's response to the intervention is accepting. The patient's progress toward treatment goals is good.   Duration of intervention: 15 minutes.    Review of Systems   PSYCHIATRIC: Pertinant items are noted in the narrative.  CONSTITUTIONAL: No weight gain or loss.   MUSCULOSKELETAL: No pain or stiffness of the joints.  NEUROLOGIC: No weakness, sensory changes, seizures, confusion, memory loss, tremor or other abnormal movements.  ENDOCRINE: No polydipsia or polyuria.  INTEGUMENTARY: No rashes or lacerations.  EYES: No exophthalmos, jaundice or blindness.  ENT: No dizziness, tinnitus or hearing loss.  RESPIRATORY: No shortness of breath.  CARDIOVASCULAR: No tachycardia or chest pain.  GASTROINTESTINAL: No nausea, vomiting, pain, constipation or diarrhea.  GENITOURINARY: No frequency, dysuria or sexual dysfunction.  HEMATOLOGIC/LYMPHATIC: No excessive bleeding, prolonged or excessive bleeding after dental extraction/injury.  ALLERGIC/IMMUNOLOGIC: No  allergic response to materials, foods or animals at this time.    Past Medical, Family and Social History: The patient's past medical, family and social history have been reviewed and updated as appropriate within the electronic medical record - see encounter notes.    Compliance: yes    Side effects: None    Risk Parameters:  Patient reports no suicidal ideation  Patient reports no homicidal ideation  Patient reports no self-injurious behavior  Patient reports no violent behavior    Exam (detailed: at least 9 elements; comprehensive: all 15 elements)   Constitutional  Vitals:  Most recent vital signs, dated greater than 90 days prior to this appointment, were reviewed.   Vitals:    08/14/24 1143   BP: 134/72   Pulse: 97   Weight: 73.2 kg (161 lb 7.8 oz)        General:  unremarkable, age appropriate     Musculoskeletal  Muscle Strength/Tone:  not examined   Gait & Station:  non-ataxic     Psychiatric  Speech:  no latency; no press   Mood & Affect:  steady  congruent and appropriate   Thought Process:  normal and logical   Associations:  intact   Thought Content:  normal, no suicidality, no homicidality, delusions, or paranoia   Insight:  intact   Judgement: behavior is adequate to circumstances   Orientation:  grossly intact   Memory: intact for content of interview   Language: grossly intact   Attention Span & Concentration:  able to focus   Fund of Knowledge:  intact and appropriate to age and level of education     Assessment and Diagnosis   Status/Progress: Based on the examination today, the patient's problem(s) is/are improved and adequately but not ideally controlled.  New problems have not been presented today.   Co-morbidities and Lack of compliance are not complicating management of the primary condition.  There are no active rule-out diagnoses for this patient at this time.     General Impression:       ICD-10-CM ICD-9-CM   1. Major depressive disorder, recurrent, mild  F33.0 296.31        Intervention/Counseling/Treatment Plan   Medication Management: The risks and benefits of medication were discussed with the patient.  Effexor  mg daily    Return to Clinic: 6 months    Risks, benefits, side effects and alternative treatments discussed with patient. Patient agrees with the current plan as documented.  Encouraged Patient to keep future appointments.  Take medications as prescribed and abstain from substance abuse.  Pt to present to ED for thoughts to harm herself or others

## 2024-08-15 ENCOUNTER — PATIENT OUTREACH (OUTPATIENT)
Dept: ADMINISTRATIVE | Facility: HOSPITAL | Age: 59
End: 2024-08-15
Payer: COMMERCIAL

## 2024-08-15 NOTE — PROGRESS NOTES
Health Maintenance Due   Topic Date Due    Pneumococcal Vaccines (Age 0-64) (1 of 2 - PCV) Never done    Foot Exam  Never done    TETANUS VACCINE  Never done    Shingles Vaccine (1 of 2) Never done    Eye Exam  03/17/2023    Colorectal Cancer Screening  06/27/2023    COVID-19 Vaccine (6 - 2023-24 season) 09/01/2023    Mammogram  09/08/2024      Chart reviewed. Triggered LINKS. Updated Care Everywhere. Pt is scheduled for upcoming pcp appointment on 08/29/24.    Sean Perla CMA  Population Health Care Coordinator  Primary Care Team

## 2024-08-16 LAB — 5OH-INDOLEACETATE SERPL-MCNC: 11 NG/ML

## 2024-08-22 LAB — PANCREASTATIN SERPL-MCNC: 49 PG/ML (ref 10–135)

## 2024-08-23 DIAGNOSIS — I10 ESSENTIAL HYPERTENSION: ICD-10-CM

## 2024-08-23 RX ORDER — LEVOCETIRIZINE DIHYDROCHLORIDE 5 MG/1
5 TABLET, FILM COATED ORAL NIGHTLY
Qty: 90 TABLET | Refills: 3 | Status: SHIPPED | OUTPATIENT
Start: 2024-08-23

## 2024-08-23 RX ORDER — LISINOPRIL 20 MG/1
20 TABLET ORAL
Qty: 90 TABLET | Refills: 0 | OUTPATIENT
Start: 2024-08-23

## 2024-08-23 RX ORDER — LEVOCETIRIZINE DIHYDROCHLORIDE 5 MG/1
5 TABLET, FILM COATED ORAL NIGHTLY
Qty: 90 TABLET | Refills: 1 | Status: CANCELLED | OUTPATIENT
Start: 2024-08-23

## 2024-08-23 RX ORDER — LISINOPRIL 20 MG/1
20 TABLET ORAL DAILY
Qty: 90 TABLET | Refills: 3 | Status: SHIPPED | OUTPATIENT
Start: 2024-08-23

## 2024-08-23 NOTE — TELEPHONE ENCOUNTER
No care due was identified.  [facility] Embedded Care Due Messages. Reference number: [id number].   8/23/2024 3:20:36 PM CDT

## 2024-08-23 NOTE — TELEPHONE ENCOUNTER
No care due was identified.  [facility] Embedded Care Due Messages. Reference number: [id number].   8/23/2024 12:35:18 PM CDT

## 2024-08-23 NOTE — TELEPHONE ENCOUNTER
No care due was identified.  [facility] Embedded Care Due Messages. Reference number: [id number].   8/23/2024 3:23:54 PM CDT

## 2024-08-24 NOTE — TELEPHONE ENCOUNTER
Zaria DC. Request already responded to by other means (e.g. phone or fax)   Refill Authorization Note   Teresa Trujillo  is requesting a refill authorization.  Brief Assessment and Rationale for Refill:  Quick Discontinue  Medication Therapy Plan:       Medication Reconciliation Completed:  No      Comments:     Note composed:10:27 PM 08/23/2024

## 2024-08-24 NOTE — TELEPHONE ENCOUNTER
Refill Decision Note   Teresa Trujillo  is requesting a refill authorization.  Brief Assessment and Rationale for Refill:  Approve     Medication Therapy Plan:        Comments:     Note composed:10:27 PM 08/23/2024

## 2024-08-28 ENCOUNTER — HOSPITAL ENCOUNTER (OUTPATIENT)
Dept: RADIOLOGY | Facility: HOSPITAL | Age: 59
Discharge: HOME | End: 2024-08-28
Attending: INTERNAL MEDICINE
Payer: COMMERCIAL

## 2024-08-28 DIAGNOSIS — C7A.010 MALIGNANT CARCINOID TUMOR OF DUODENUM: ICD-10-CM

## 2024-08-28 PROCEDURE — A9698 NON-RAD CONTRAST MATERIALNOC: HCPCS | Performed by: INTERNAL MEDICINE

## 2024-08-28 PROCEDURE — 74177 CT ABD & PELVIS W/CONTRAST: CPT | Mod: 26,,, | Performed by: RADIOLOGY

## 2024-08-28 PROCEDURE — 74177 CT ABD & PELVIS W/CONTRAST: CPT | Mod: TC

## 2024-08-28 PROCEDURE — 25500020 PHARM REV CODE 255: Performed by: INTERNAL MEDICINE

## 2024-08-28 PROCEDURE — 71260 CT THORAX DX C+: CPT | Mod: 26,,, | Performed by: RADIOLOGY

## 2024-08-28 RX ADMIN — BARIUM SULFATE 450 ML: 21 SUSPENSION ORAL at 10:08

## 2024-08-28 RX ADMIN — IOHEXOL 75 ML: 350 INJECTION, SOLUTION INTRAVENOUS at 10:08

## 2024-08-29 ENCOUNTER — OFFICE VISIT (OUTPATIENT)
Dept: INTERNAL MEDICINE | Facility: CLINIC | Age: 59
End: 2024-08-29
Payer: COMMERCIAL

## 2024-08-29 VITALS
SYSTOLIC BLOOD PRESSURE: 122 MMHG | WEIGHT: 161.63 LBS | OXYGEN SATURATION: 96 % | BODY MASS INDEX: 23.94 KG/M2 | HEART RATE: 95 BPM | DIASTOLIC BLOOD PRESSURE: 70 MMHG | HEIGHT: 69 IN

## 2024-08-29 DIAGNOSIS — I10 ESSENTIAL HYPERTENSION: ICD-10-CM

## 2024-08-29 DIAGNOSIS — D3A.010 CARCINOID TUMOR OF DUODENUM, UNSPECIFIED WHETHER MALIGNANT: ICD-10-CM

## 2024-08-29 DIAGNOSIS — K06.1 GINGIVAL HYPERPLASIA: Primary | ICD-10-CM

## 2024-08-29 DIAGNOSIS — E11.9 TYPE 2 DIABETES MELLITUS WITHOUT COMPLICATION, WITHOUT LONG-TERM CURRENT USE OF INSULIN: ICD-10-CM

## 2024-08-29 PROCEDURE — 3052F HG A1C>EQUAL 8.0%<EQUAL 9.0%: CPT | Mod: CPTII,S$GLB,, | Performed by: INTERNAL MEDICINE

## 2024-08-29 PROCEDURE — 99214 OFFICE O/P EST MOD 30 MIN: CPT | Mod: S$GLB,,, | Performed by: INTERNAL MEDICINE

## 2024-08-29 PROCEDURE — 3066F NEPHROPATHY DOC TX: CPT | Mod: CPTII,S$GLB,, | Performed by: INTERNAL MEDICINE

## 2024-08-29 PROCEDURE — 3074F SYST BP LT 130 MM HG: CPT | Mod: CPTII,S$GLB,, | Performed by: INTERNAL MEDICINE

## 2024-08-29 PROCEDURE — 3078F DIAST BP <80 MM HG: CPT | Mod: CPTII,S$GLB,, | Performed by: INTERNAL MEDICINE

## 2024-08-29 PROCEDURE — 1160F RVW MEDS BY RX/DR IN RCRD: CPT | Mod: CPTII,S$GLB,, | Performed by: INTERNAL MEDICINE

## 2024-08-29 PROCEDURE — 3061F NEG MICROALBUMINURIA REV: CPT | Mod: CPTII,S$GLB,, | Performed by: INTERNAL MEDICINE

## 2024-08-29 PROCEDURE — 4010F ACE/ARB THERAPY RXD/TAKEN: CPT | Mod: CPTII,S$GLB,, | Performed by: INTERNAL MEDICINE

## 2024-08-29 PROCEDURE — 1159F MED LIST DOCD IN RCRD: CPT | Mod: CPTII,S$GLB,, | Performed by: INTERNAL MEDICINE

## 2024-08-29 PROCEDURE — 3008F BODY MASS INDEX DOCD: CPT | Mod: CPTII,S$GLB,, | Performed by: INTERNAL MEDICINE

## 2024-08-29 PROCEDURE — 99999 PR PBB SHADOW E&M-EST. PATIENT-LVL V: CPT | Mod: PBBFAC,,, | Performed by: INTERNAL MEDICINE

## 2024-08-29 RX ORDER — METFORMIN HYDROCHLORIDE 500 MG/1
500 TABLET, EXTENDED RELEASE ORAL
Qty: 90 TABLET | Refills: 3 | Status: SHIPPED | OUTPATIENT
Start: 2024-08-29 | End: 2025-08-29

## 2024-08-30 NOTE — PROGRESS NOTES
CC:  Gum swelling     HPI:  The patient is a 59-year-old female with type 2 diabetes, hypertension, hyperlipidemia, depression, migraine headaches and carcinoid tumor of the duodenum who presents today with complaints of upper gum swelling.  Symptoms started about 6 weeks ago.  There is no pain; but, she can feel that it feels different when she rubs her tongue over the gum.  She did see her dentist was concerned about possible low vitamin-C.  This has checked a the results came back normal.  She was placed on doxycycline as well as chlorhexidine mouthwash.  This did not seem to help.  She reports having a similar episode 4 years ago.  She reports no new medications or foods.  She was feeling well overall.  The patient does have an elevated a 1 C.  She has been on metformin in the past without any problems.    Answers submitted by the patient for this visit:  Review of Systems Questionnaire (Submitted on 8/22/2024)  activity change: No  unexpected weight change: Yes  neck pain: No  hearing loss: No  rhinorrhea: No  trouble swallowing: No  eye discharge: No  visual disturbance: No  chest tightness: No  wheezing: No  chest pain: No  palpitations: No  blood in stool: No  constipation: No  vomiting: No  diarrhea: No  polydipsia: No  polyuria: No  difficulty urinating: No  hematuria: No  menstrual problem: No  dysuria: No  joint swelling: No  arthralgias: No  headaches: No  weakness: No  confusion: No  dysphoric mood: No    Physical exam   General appearance: No acute distress   H EENT:  Oral CABG evaluated.  The patient did have some redness and swelling involving the anterior portion of her upper gum.  No cervical adenopathy.    Pulmonary: Good inspiratory, expiratory breath sounds are heard.  Lungs are clear auscultation.  Cardiovascular: S1-S2, rhythm is regular.  Extremities without edema.    GI: Abdomen was nontender, nondistended without hepatosplenomegaly      Assessment:    Gingival hyperplasia   2.  Type 2  diabetes not controlled  3.  Hypertension currently stable  4.  Carcinoid tumor   Plan:    We will refer the patient to ENT   2.  Will start the patient on metformin 500 mg once a day   3.  The patient has follow up in 3 months with a CBC and A1c.

## 2024-09-03 ENCOUNTER — OFFICE VISIT (OUTPATIENT)
Dept: OTOLARYNGOLOGY | Facility: CLINIC | Age: 59
End: 2024-09-03
Payer: COMMERCIAL

## 2024-09-03 VITALS
HEIGHT: 69 IN | TEMPERATURE: 98 F | BODY MASS INDEX: 24.16 KG/M2 | WEIGHT: 163.13 LBS | HEART RATE: 110 BPM | SYSTOLIC BLOOD PRESSURE: 123 MMHG | DIASTOLIC BLOOD PRESSURE: 79 MMHG

## 2024-09-03 DIAGNOSIS — K06.1 GINGIVAL HYPERPLASIA: ICD-10-CM

## 2024-09-03 NOTE — PROGRESS NOTES
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 She had an EGD on 2022 that noted a duodenal lesion. Pathology showed a well diff grade 1 NET. Mitotic rate <1 mitosis per 2 mm2. Ki 67 1%. She had yearly surveillance scope. Last one was in 2024 and negative.   CT C/A/P 24:  1. No convincing local recurrence or metastatic disease.  2. 2 mm left lung nodule.  For a solid nodule <6 mm, Fleischner Society 2017 guidelines recommend no routine follow up for a low risk patient, or follow-up with non-contrast chest CT at 12 months in a high risk patient.  3. Hepatomegaly slightly increased compared to prior.  4. Additional findings as above.  Patient presents today for further management. Feeling well. Her daughter, Ronda, is on speaker phone.     ECO    Past Medical History:   Diagnosis Date    Diabetes mellitus, type 2     HTN (hypertension)     Hyperlipidemia     Migraine         Past Surgical History:   Procedure Laterality Date     SECTION      CHOLECYSTECTOMY      DIAGNOSTIC LAPAROSCOPY Right 2020    Procedure: LAPAROSCOPY, DIAGNOSTIC w/ possible right diaphgramatic hernia repair;  Surgeon: Alexander Trujillo MD;  Location: [address];  Service: General;  Laterality: Right;  tramatic diaphgramtic    LAPAROSCOPIC CHOLECYSTECTOMY N/A 2019    Procedure: CHOLECYSTECTOMY, LAPAROSCOPIC;  Surgeon: Juan Miguel Quinn Jr., MD;  Location: [facility];  Service: General;  Laterality: N/A;    LIVER BIOPSY         Family History   Problem Relation Name Age of Onset    Thyroid disease Mother      Heart disease Sister      Cancer Brother      Diabetes Maternal Grandmother         Social History     Socioeconomic History    Marital status:    Tobacco Use    Smoking status: Never    Smokeless tobacco: Never   Substance and Sexual Activity     Alcohol use: Yes     Comment: casually    Drug use: No     Social Determinants of Health     Financial Resource Strain: Low Risk  (8/13/2024)    Overall Financial Resource Strain (CARDIA)     Difficulty of Paying Living Expenses: Not hard at all   Food Insecurity: No Food Insecurity (8/13/2024)    Hunger Vital Sign     Worried About Running Out of Food in the Last Year: Never true     Ran Out of Food in the Last Year: Never true   Transportation Needs: No Transportation Needs (6/22/2023)    Received [facility]    PRAPARE - Transportation     Lack of Transportation (Medical): No     Lack of Transportation (Non-Medical): No   Physical Activity: Insufficiently Active (8/13/2024)    Exercise Vital Sign     Days of Exercise per Week: 2 days     Minutes of Exercise per Session: 30 min   Stress: No Stress Concern Present (8/13/2024)    [employer] - Occupational Stress Questionnaire     Feeling of Stress : Only a little   Housing Stability: Unknown (8/13/2024)    Housing Stability Vital Sign     Unable to Pay for Housing in the Last Year: No       Review of Systems   Constitutional:  Negative for appetite change, chills, fatigue, fever and unexpected weight change.   HENT:  Negative for mouth sores, nosebleeds, tinnitus, trouble swallowing and voice change.    Eyes:  Negative for pain, redness and visual disturbance.   Respiratory:  Negative for cough, shortness of breath and wheezing.    Cardiovascular:  Negative for chest pain, palpitations and leg swelling.   Gastrointestinal:  Negative for abdominal distention, abdominal pain, blood in stool, constipation, diarrhea, nausea and vomiting.   Endocrine: Negative for polydipsia, polyphagia and polyuria.   Genitourinary:  Negative for flank pain, frequency and hematuria.   Musculoskeletal:  Negative for arthralgias, back pain, gait problem, joint swelling, myalgias, neck pain and neck stiffness.   Skin:  Negative for  color change, pallor, rash and wound.   Neurological:  Negative for tremors, seizures, syncope, speech difficulty, weakness, light-headedness, numbness and headaches.   Hematological:  Negative for adenopathy. Does not bruise/bleed easily.   Psychiatric/Behavioral:  Negative for confusion, dysphoric mood and self-injury. The patient is not nervous/anxious.    All other systems reviewed and are negative.      Objective:  Physical Exam  Vitals reviewed.   Constitutional:       General: She is not in acute distress.     Appearance: She is well-developed. She is not diaphoretic.   HENT:      Head: Normocephalic and atraumatic.      Mouth/Throat:      Pharynx: No oropharyngeal exudate.   Eyes:      General: No scleral icterus.     Conjunctiva/sclera: Conjunctivae normal.      Pupils: Pupils are equal, round, and reactive to light.   Neck:      Thyroid: No thyromegaly.      Vascular: No JVD.   Cardiovascular:      Rate and Rhythm: Normal rate and regular rhythm.      Heart sounds: Normal heart sounds. No murmur heard.     No friction rub. No gallop.   Pulmonary:      Effort: Pulmonary effort is normal. No respiratory distress.      Breath sounds: Normal breath sounds. No wheezing or rales.   Abdominal:      General: Bowel sounds are normal. There is no distension.      Palpations: Abdomen is soft. There is no mass.      Tenderness: There is no abdominal tenderness. There is no rebound.      Hernia: No hernia is present.   Musculoskeletal:         General: No tenderness or deformity. Normal range of motion.      Cervical back: Normal range of motion and neck supple.   Lymphadenopathy:      Cervical: No cervical adenopathy.      Upper Body:      Right upper body: No supraclavicular adenopathy.      Left upper body: No supraclavicular adenopathy.   Skin:     General: Skin is warm and dry.      Coloration: Skin is not pale.      Findings: No erythema or rash.   Neurological:      Mental Status: She is alert and oriented to  person, place, and time.      Cranial Nerves: No cranial nerve deficit.      Motor: No abnormal muscle tone.   Psychiatric:         Behavior: Behavior normal.         Thought Content: Thought content normal.         Judgment: Judgment normal.       Labs:  I have personally reviewed her labs. CBC, CMP unremarkable. Serotonin, pancreastatin, 5-HIAA levels are normal    Imaging Data:  I have personally reviewed her CT scan. Negative for NET.   CT C/A/P 8/28/24:  1. No convincing local recurrence or metastatic disease.  2. 2 mm left lung nodule.  For a solid nodule <6 mm, Fleischner Society 2017 guidelines recommend no routine follow up for a low risk patient, or follow-up with non-contrast chest CT at 12 months in a high risk patient.  3. Hepatomegaly slightly increased compared to prior.  4. Additional findings as above.    Assessment and plan:  1. Carcinoid tumor of duodenum, unspecified whether malignant    2. Malignant carcinoid tumor of duodenum    3. 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 She has undergone yearly surveillance scope. Most recent one in March 2024 and was negative.   - Long discussion with patient re the diagnosis, natural history and prognosis of a well diff grade 1 NET of the duodenum. Discussed indolent nature of disease and good prognosis. [facility] yearly surveillance scope.  - reviewed CT results. BONNIE. Will do CT every 2-3 years  - reviewed lab results. Biomarkers normal. Discussed the limitations in biomarkers.   - they asked a lot of great questions. I answered them in the clinic  - RTC in March  - messaged Dr Valenzuela for upper EUS in [facility] 2025    3.  - discussed finding of hepatomegaly on CT. Will refer to hepatology. Patient understands and agrees with the plan.     I spent 60 minutes reviewing the chart, interpreting laboratory result and imaging data, coordinating  patient's care, with at least 50% of the time on face-to-face counseling.     Route Chart for Scheduling    Med Onc Chart Routing      Follow up with physician . Get CBC, CMP, serotonin, pancreastatin, 5-HIAA in March 2025, see me 3 weeks after   Follow up with [facility]    Infusion scheduling note    Injection scheduling note    Labs CBC and CMP   Scheduling:  Preferred lab:  Lab interval:  serotonin, pancreastatin, 5-HIAA   Imaging    Pharmacy appointment    Other referrals

## 2024-09-03 NOTE — PROGRESS NOTES
Ear, Nose, & Throat  Otolaryngology - Head & Neck Surgery    Subjective:     Chief Complaint:   Chief Complaint   Patient presents with    Oral Pain     Redness on gums       Teresa Trujillo is a 59 y.o. female who was referred to me by Dr. Keith Freeman in consultation for gingival hyperplasia. She has had this issue before around 4 years ago which self resolved. She first noted a change to her upper gingiva around July of this year. She was seen by her general dentist who recommend an antibiotic and chlorhexadine rinses, but it has not resolved. She was recently seen by her PCP who recommended her referral to ENT. She states this episode is milder than her previous episode. She denies any pain or discomfort to the area. Also denies any bleeding spontaneous or with teeth cleaning. She denies tobacco use. Denies any unexpected weight loss, dysphagia, odynophagia, dysphonia, dyspnea, otalgia or constitutional symptoms. She has had previous lab work which shows normal vitamin C and B12 levels. She does not take any anticonvulsants, immunosuppresants, CCBs or antibiotics.     Past Medical History  She has a past medical history of Diabetes mellitus, type 2, HTN (hypertension), Hyperlipidemia, and Migraine.     Active Ambulatory Problems     Diagnosis Date Noted    Type 2 diabetes mellitus without complication, without long-term current use of insulin 07/17/2020    Essential hypertension 07/17/2020    Migraine without aura and without status migrainosus, not intractable 10/14/2021    Diabetic polyneuropathy associated with type 2 diabetes mellitus 02/03/2022    Mixed hyperlipidemia 03/14/2022    Malignant carcinoid tumor of duodenum 09/03/2022    Adjustment disorder with depressed mood 05/15/2023    Major depressive disorder, recurrent, mild 05/15/2023    Generalized anxiety disorder 05/15/2023    Stress and adjustment reaction 05/24/2023    Stress 08/09/2023    Physical deconditioning 08/09/2023     Resolved  Ambulatory Problems     Diagnosis Date Noted    Gallbladder attack 2019    Diaphragmatic hernia without obstruction and without gangrene 2019    Diaphragmatic hernia 2020     Past Medical History:   Diagnosis Date    Diabetes mellitus, type 2     HTN (hypertension)     Hyperlipidemia     Migraine        Past Surgical History  She has a past surgical history that includes  section; Liver biopsy; Laparoscopic cholecystectomy (N/A, 2019); Cholecystectomy; and Diagnostic laparoscopy (Right, 2020).    Past Surgical History:   Procedure Laterality Date     SECTION      CHOLECYSTECTOMY      DIAGNOSTIC LAPAROSCOPY Right 2020    Procedure: LAPAROSCOPY, DIAGNOSTIC w/ possible right diaphgramatic hernia repair;  Surgeon: Alexander Trujillo MD;  Location: [address];  Service: General;  Laterality: Right;  tramatic diaphgramtic    LAPAROSCOPIC CHOLECYSTECTOMY N/A 2019    Procedure: CHOLECYSTECTOMY, LAPAROSCOPIC;  Surgeon: Juan Miguel Quinn Jr., MD;  Location: [facility];  Service: General;  Laterality: N/A;    LIVER BIOPSY          Family History  Her family history includes Cancer in her brother; Diabetes in her maternal grandmother; Heart disease in her sister; Thyroid disease in her mother.    Social History  She reports that she has never smoked. She has never used smokeless tobacco. She reports current alcohol use. She reports that she does not use drugs.    Allergies  She is allergic to atorvastatin.    Medications  She has a current medication list which includes the following prescription(s): baclofen, blood sugar diagnostic, diclofenac, famotidine, lancets, levocetirizine, lisinopril, metformin, norethindrone ac/eth estradiol, rosuvastatin, sumatriptan, and venlafaxine, and the following Facility-Administered Medications: 0.9% nacl and lidocaine (pf) 10 mg/ml (1%).    Review of Systems     Constitutional: Negative for appetite change, chills, fatigue, fever and  "unexpected weight loss.      HENT: Negative for ear discharge, ear infection, ear pain, facial swelling, hearing loss, mouth sores, nosebleeds, postnasal drip, ringing in the ears, runny nose, sinus infection, sinus pressure, sore throat, stuffy nose, tonsil infection, dental problems, trouble swallowing and voice change.      Eyes:  Negative for change in eyesight, eye drainage and photophobia.     Respiratory:  Negative for cough, shortness of breath and wheezing.      Cardiovascular:  Negative for chest pain.     Gastrointestinal:  Negative for abdominal pain, acid reflux, heartburn and vomiting.     Genitourinary: Negative for difficulty urinating.     Musc: Positive for aching joints. Negative for aching muscles.     Skin: Negative for rash.     Allergy: Negative for seasonal allergies.     Neurological: Negative for dizziness, headaches and light-headedness.      Hematologic: Negative for swollen glands.      Psychiatric: Positive for depression and nervous/anxious.         Objective:     /79 (BP Location: Left forearm)   Pulse 110   Temp 98.2 °F (36.8 °C)   Ht 5' 9" (1.753 m)   Wt 74 kg (163 lb 2.3 oz)   BMI 24.09 kg/m²      Constitutional:   She appears well-developed. She is cooperative. Normal speech.  No hoarse voice.      Head:  Normocephalic. Salivary glands normal.  Facial strength is normal.      Ears:    Right Ear: No drainage or tenderness. Tympanic membrane is not perforated. Tympanic membrane mobility is normal. No middle ear effusion. No decreased hearing is noted.   Left Ear: No drainage or tenderness. Tympanic membrane is not perforated. Tympanic membrane mobility is normal.  No middle ear effusion. No decreased hearing is noted.     Nose:  No mucosal edema, rhinorrhea, septal deviation or polyps. No epistaxis. Turbinates normal, no turbinate masses and no turbinate hypertrophy.  Right sinus exhibits no maxillary sinus tenderness and no frontal sinus tenderness. Left sinus exhibits " "no maxillary sinus tenderness and no frontal sinus tenderness.     Mouth/Throat  Oropharynx clear and moist without lesions or asymmetry and normal uvula midline. She does not have dentures. Normal dentition. No oral lesions or mucous membrane lesions. No oropharyngeal exudate or posterior oropharyngeal erythema. Mirror exam not performed due to patient tolerance.  Mirror exam not performed due to patient tolerance.    Gingival hyperplasia and erythema of the maxillary gingiva between incisors. Non-friable, non-tender, no discrete masses or ulcerations      Neck:  Neck normal without thyromegaly masses, asymmetry, normal tracheal structure, crepitus, and tenderness, thyroid normal, trachea normal and no adenopathy. Normal range of motion present.     She has no cervical adenopathy.     Cardiovascular:    Regular rhythm.              Pulmonary/Chest:   Effort normal.     Psychiatric:   She has a normal mood and affect. Her speech is normal and behavior is normal.     Neurological:   No cranial nerve deficit.     Skin:   No rash noted.         Data Review:   LABS    WBC (K/uL)   Date Value   08/14/2024 3.53 (L)     Eosinophil % (%)   Date Value   08/14/2024 2.3     Eos # (K/uL)   Date Value   08/14/2024 0.1     Platelets (K/uL)   Date Value   08/14/2024 181     Glucose (mg/dL)   Date Value   08/14/2024 205 (H)     No results found for: "IGE"  TSH   Date Value Ref Range Status   03/07/2022 0.674 0.400 - 4.000 uIU/mL Final     IMAGING    Assessment:     1. Gingival hyperplasia          Plan:     I had a long extensive discussion with the patient regarding gingival hyperplasia, its causes, and possible management strategies. She expressed concern for potential malignant conditions for which her physical exam findings and history are not suggestive. Differential diagnosis for gingival enlargement is broad including vitamin deficiencies, hormonal changes, drug-induced, autoimmune or even secondary to mouth-breathing. " Though some causes can be ruled out easily many cases remain idiopathic. The patient's hyperplasia is regional, non-tender, and non-friable and not a discrete mass; it appears more inflammatory than neoplastic.The patient was given several options given this asymptomatic finding including observation, topical treatment with steroids, or an office based-biopsy. As this is not currently bothering her, she chose to proceed with observation, which is reasonable. She will return to clinic in 4-6 weeks or sooner if worsening. She may benefit from referral to a periodontist for SRP. The patient was given an opportunity to ask questions. She understood and agreed with the plan.

## 2024-09-04 ENCOUNTER — OFFICE VISIT (OUTPATIENT)
Dept: HEMATOLOGY/ONCOLOGY | Facility: CLINIC | Age: 59
End: 2024-09-04
Payer: COMMERCIAL

## 2024-09-04 VITALS
HEIGHT: 69 IN | RESPIRATION RATE: 16 BRPM | OXYGEN SATURATION: 97 % | BODY MASS INDEX: 24.42 KG/M2 | WEIGHT: 164.88 LBS | DIASTOLIC BLOOD PRESSURE: 72 MMHG | TEMPERATURE: 99 F | HEART RATE: 93 BPM | SYSTOLIC BLOOD PRESSURE: 117 MMHG

## 2024-09-04 DIAGNOSIS — R16.0 HEPATOMEGALY: ICD-10-CM

## 2024-09-04 DIAGNOSIS — C7A.010 MALIGNANT CARCINOID TUMOR OF DUODENUM: ICD-10-CM

## 2024-09-04 DIAGNOSIS — D3A.010 CARCINOID TUMOR OF DUODENUM, UNSPECIFIED WHETHER MALIGNANT: Primary | ICD-10-CM

## 2024-09-04 PROCEDURE — 99999 PR PBB SHADOW E&M-EST. PATIENT-LVL V: CPT | Mod: PBBFAC,,, | Performed by: INTERNAL MEDICINE

## 2024-09-18 DIAGNOSIS — Z12.31 OTHER SCREENING MAMMOGRAM: ICD-10-CM

## 2024-10-16 ENCOUNTER — PATIENT MESSAGE (OUTPATIENT)
Dept: INTERNAL MEDICINE | Facility: CLINIC | Age: 59
End: 2024-10-16
Payer: COMMERCIAL

## 2024-10-16 DIAGNOSIS — E78.2 MIXED HYPERLIPIDEMIA: Primary | ICD-10-CM

## 2024-10-16 NOTE — TELEPHONE ENCOUNTER
No care due was identified.  [facility] Embedded Care Due Messages. Reference number: [id number].   10/16/2024 3:06:51 PM CDT

## 2024-10-17 RX ORDER — ROSUVASTATIN CALCIUM 5 MG/1
5 TABLET, COATED ORAL DAILY
Qty: 90 TABLET | Refills: 3 | Status: SHIPPED | OUTPATIENT
Start: 2024-10-17

## 2024-11-12 ENCOUNTER — OFFICE VISIT (OUTPATIENT)
Dept: INTERNAL MEDICINE | Facility: CLINIC | Age: 59
End: 2024-11-12
Payer: COMMERCIAL

## 2024-11-12 VITALS
HEIGHT: 69 IN | WEIGHT: 165.13 LBS | DIASTOLIC BLOOD PRESSURE: 86 MMHG | BODY MASS INDEX: 24.46 KG/M2 | OXYGEN SATURATION: 99 % | SYSTOLIC BLOOD PRESSURE: 132 MMHG | HEART RATE: 106 BPM

## 2024-11-12 DIAGNOSIS — E11.9 TYPE 2 DIABETES MELLITUS WITHOUT COMPLICATION, WITHOUT LONG-TERM CURRENT USE OF INSULIN: Primary | ICD-10-CM

## 2024-11-12 DIAGNOSIS — F43.21 ADJUSTMENT DISORDER WITH DEPRESSED MOOD: ICD-10-CM

## 2024-11-12 DIAGNOSIS — D70.9 NEUTROPENIA, UNSPECIFIED TYPE: ICD-10-CM

## 2024-11-12 DIAGNOSIS — E11.42 DIABETIC POLYNEUROPATHY ASSOCIATED WITH TYPE 2 DIABETES MELLITUS: ICD-10-CM

## 2024-11-12 DIAGNOSIS — I10 ESSENTIAL HYPERTENSION: ICD-10-CM

## 2024-11-12 DIAGNOSIS — E78.2 MIXED HYPERLIPIDEMIA: ICD-10-CM

## 2024-11-12 PROCEDURE — 3066F NEPHROPATHY DOC TX: CPT | Mod: CPTII,S$GLB,, | Performed by: NURSE PRACTITIONER

## 2024-11-12 PROCEDURE — 3075F SYST BP GE 130 - 139MM HG: CPT | Mod: CPTII,S$GLB,, | Performed by: NURSE PRACTITIONER

## 2024-11-12 PROCEDURE — 4010F ACE/ARB THERAPY RXD/TAKEN: CPT | Mod: CPTII,S$GLB,, | Performed by: NURSE PRACTITIONER

## 2024-11-12 PROCEDURE — 3052F HG A1C>EQUAL 8.0%<EQUAL 9.0%: CPT | Mod: CPTII,S$GLB,, | Performed by: NURSE PRACTITIONER

## 2024-11-12 PROCEDURE — 3008F BODY MASS INDEX DOCD: CPT | Mod: CPTII,S$GLB,, | Performed by: NURSE PRACTITIONER

## 2024-11-12 PROCEDURE — 99214 OFFICE O/P EST MOD 30 MIN: CPT | Mod: S$GLB,,, | Performed by: NURSE PRACTITIONER

## 2024-11-12 PROCEDURE — 3079F DIAST BP 80-89 MM HG: CPT | Mod: CPTII,S$GLB,, | Performed by: NURSE PRACTITIONER

## 2024-11-12 PROCEDURE — 3061F NEG MICROALBUMINURIA REV: CPT | Mod: CPTII,S$GLB,, | Performed by: NURSE PRACTITIONER

## 2024-11-12 PROCEDURE — 1160F RVW MEDS BY RX/DR IN RCRD: CPT | Mod: CPTII,S$GLB,, | Performed by: NURSE PRACTITIONER

## 2024-11-12 PROCEDURE — 99999 PR PBB SHADOW E&M-EST. PATIENT-LVL IV: CPT | Mod: PBBFAC,,, | Performed by: NURSE PRACTITIONER

## 2024-11-12 PROCEDURE — 1159F MED LIST DOCD IN RCRD: CPT | Mod: CPTII,S$GLB,, | Performed by: NURSE PRACTITIONER

## 2024-11-12 RX ORDER — METFORMIN HYDROCHLORIDE 500 MG/1
500 TABLET, EXTENDED RELEASE ORAL 2 TIMES DAILY WITH MEALS
Qty: 180 TABLET | Refills: 3 | Status: SHIPPED | OUTPATIENT
Start: 2024-11-12 | End: 2025-11-12

## 2024-11-12 NOTE — PROGRESS NOTES
CHIEF COMPLAINT: Type 2 Diabetes     HPI: Mrs. Teresa Trujillo is a 59 y.o. female who was diagnosed with Type 2 DM , new onset 7/2024.   Lab Results   Component Value Date    HGBA1C 8.8 (H) 07/19/2024   Dealing with hot flashes, menopause  Being seen by me for the first time.   Seen by Dr. BRITTANY Asencio.     No formal exercise.   Easily can run/jog in the past.     Dietary habits:  Needs more veggies  Loves popcorn from [employer]   Less eater, picky eater  16 oz of coke for 2 days  4 bottles of water/day   Snacks: chips, pickle    Fruits: plums, grapes , nectarines, banana    PREVIOUS DIABETES MEDICATIONS TRIED  Metformin xr    CURRENT DIABETIC MEDS: metformin xr 500 mg in am    Makes frequent changes to his/her insulin regimen on the basis of blood glucose data    Patient is willing and able to use the device  Demonstrated an understanding of the technology and is motivated to use CGM  Patient expected to adhere to a comprehensive diabetes treatment plan and patient has adequate medical supervision    Has multiple impaired awareness of hypoglycemia (hypoglycemia unawareness)      Diabetes Management Status    Statin: Taking  ACE/ARB: Taking    Screening or Prevention Patient's value Goal Complete/Controlled?   HgA1C Testing and Control   Lab Results   Component Value Date    HGBA1C 8.8 (H) 07/19/2024      Annually/Less than 8% No   Lipid profile : 07/19/2024 Annually Yes   LDL control Lab Results   Component Value Date    LDLCALC 96.0 07/19/2024    Annually/Less than 100 mg/dl  Yes   Nephropathy screening Lab Results   Component Value Date    LABMICR 29.0 08/06/2024     Lab Results   Component Value Date    PROTEINUA Negative 05/29/2023    Annually Yes   Blood pressure BP Readings from Last 1 Encounters:   11/12/24 132/86    Less than 140/90 Yes   Dilated retinal exam : 03/17/2022 Annually Yes   Foot exam   Most Recent Foot Exam Date: Not Found Annually No     REVIEW OF SYSTEMS  General: no weakness, fatigue, or weight  "changes.   Eyes: no double or blurred vision, eye pain, or redness  Cardiovascular: no chest pain, palpitations, edema, or murmurs.   Respiratory: no cough or dyspnea.   GI: no heartburn, nausea, or changes in bowel patterns; good appetite.   Skin: no rashes, dryness, itching, or reactions at insulin injection sites.  Neuro: no numbness, tingling, tremors, or vertigo.   Endocrine: no polyuria, polydipsia, polyphagia, heat or cold intolerance.     Vital Signs  /86 (BP Location: Left arm, Patient Position: Sitting)   Pulse 106   Ht 5' 9" (1.753 m)   Wt 74.9 kg (165 lb 2 oz)   SpO2 99%   BMI 24.38 kg/m²     Hemoglobin A1C   Date Value Ref Range Status   07/19/2024 8.8 (H) 4.0 - 5.6 % Final     Comment:     ADA Screening Guidelines:  5.7-6.4%  Consistent with prediabetes  >or=6.5%  Consistent with diabetes    High levels of fetal hemoglobin interfere with the HbA1C  assay. Heterozygous hemoglobin variants (HbS, HgC, etc)do  not significantly interfere with this assay.   However, presence of multiple variants may affect accuracy.     05/29/2023 5.9 (H) 4.0 - 5.6 % Final     Comment:     ADA Screening Guidelines:  5.7-6.4%  Consistent with prediabetes  >or=6.5%  Consistent with diabetes    High levels of fetal hemoglobin interfere with the HbA1C  assay. Heterozygous hemoglobin variants (HbS, HgC, etc)do  not significantly interfere with this assay.   However, presence of multiple variants may affect accuracy.     09/14/2022 6.0 (H) 4.0 - 5.6 % Final     Comment:     ADA Screening Guidelines:  5.7-6.4%  Consistent with prediabetes  >or=6.5%  Consistent with diabetes    High levels of fetal hemoglobin interfere with the HbA1C  assay. Heterozygous hemoglobin variants (HbS, HgC, etc)do  not significantly interfere with this assay.   However, presence of multiple variants may affect accuracy.          Chemistry        Component Value Date/Time     08/14/2024 1136    K 4.1 08/14/2024 1136     08/14/2024 " 1136    CO2 27 08/14/2024 1136    BUN 8 08/14/2024 1136    CREATININE 0.8 08/14/2024 1136     (H) 08/14/2024 1136        Component Value Date/Time    CALCIUM 10.0 08/14/2024 1136    ALKPHOS 110 08/14/2024 1136    AST 30 08/14/2024 1136    ALT 29 08/14/2024 1136    BILITOT 0.9 08/14/2024 1136    ESTGFRAFRICA >60 03/07/2022 1201    EGFRNONAA >60 03/07/2022 1201           Lab Results   Component Value Date    TSH 0.674 03/07/2022      Lab Results   Component Value Date    CHOL 174 07/19/2024    CHOL 207 (H) 03/08/2024    CHOL 236 (H) 05/29/2023     Lab Results   Component Value Date    HDL 60 07/19/2024    HDL 76 (H) 03/08/2024    HDL 77 (H) 05/29/2023     Lab Results   Component Value Date    LDLCALC 96.0 07/19/2024    LDLCALC 113 03/08/2024    LDLCALC 133.8 05/29/2023     Lab Results   Component Value Date    TRIG 90 07/19/2024    TRIG 92 03/08/2024    TRIG 126 05/29/2023     Lab Results   Component Value Date    CHOLHDL 34.5 07/19/2024    CHOLHDL 2.72 03/08/2024    CHOLHDL 32.6 05/29/2023         PHYSICAL EXAMINATION  Constitutional: Appears well, no distress Reviewed vitals above.  Eyes: conjunctivae & lids intact; PERRLA, EOMs intact; optic discs   Neck: Supple, trachea midline.   Respiratory: No wheezes, even and unlabored  Cardiovascular: RRR;  no edema or varicosities  Lymph: no lymphadenopathy palpated  Skin: warm and dry; no injection site reactions, no acanthosis nigracans observed.  Neuro:patient alert and cooperative, normal affect; steady gait.  Psychiatric: judgement & insight intact, orientation of time, place & person intact, memory; mood & affect wnl     Diabetes Foot Exam:   Protective Sensation (w/ 10 gram monofilament):  Right: Intact 6/6   Left: Intact 6/6     Visual Inspection:  Normal -  Bilateral    Pedal Pulses:   Right: Present  Left: Present  Assessment/Plan  1. Type 2 diabetes mellitus without complication, without long-term current use of insulin  Hemoglobin A1C per pcp 11/25/24      Hemoglobin A1C in 4 months    A1c goal less than 7%  Carb count /meal prepping book given  Watch carbs 30-45 gm carbs /day   Exercise 3-5x a week           2. Diabetic polyneuropathy associated with type 2 diabetes mellitus  F/u with podiatry   stable      3. Mixed hyperlipidemia  Lab Results   Component Value Date    LDLCALC 96.0 07/19/2024     At goal   On statin       4. Essential hypertension  Bp controlled  On arb/acei       5. Adjustment disorder with depressed mood  F/u with psych   stable      6. Neutropenia, unspecified type  Cbc done per pcp periodically           FOLLOW UP  In 4 months

## 2024-11-12 NOTE — PATIENT INSTRUCTIONS
Follow up in 4 months w/ Irielle  A1c prior -4 months   A1c 11/25/24    Lab Results   Component Value Date    HGBA1C 8.8 (H) 07/19/2024   Goal less than 7%  Metformin 500 mg twice a day     Goal  no higher than 180     Snacks can be an important part of a balanced, healthy meal plan. They allow you to eat more frequently, feeling full and satisfied throughout the day. Also, they allow you to spread carbohydrates evenly, which may stabilize blood sugars.  Plus, snacks are enjoyable!     The amount of carbohydrate needed at snacks varies. Generally, about 15-30 grams of carbohydrate per snack is recommended.  Below you will find some tasty treats.       0-5 gm carb  Crystal Light  Vitamin Water Zero  Herbal tea, unsweetened  2 tsp peanut butter on celery  1./2 cup sugar-free jell-o  1 sugar-free popsicle  ¼ cup blueberries  8oz Blue Betzy unsweetened almond milk  5 baby carrots & celery sticks, cucumbers, bell peppers dipped in ¼ cup salsa, 2Tbsp light ranch dressing or 2Tbsp plain Greek yogurt  10 Goldfish crackers  ½ oz low-fat cheese or string cheese  1 closed handful of nuts, unsalted  1 Tbsp of sunflower seeds, unsalted  1 cup Smart Pop popcorn  1 whole grain brown rice cake        15 gm carb  1 small piece of fruit or ½ banana or 1/2 cup lite canned fruit  3 pernell cracker squares  3 cups Smart Pop popcorn, top spray butter, [place] lite salt or cinnamon and Truvia  5 Vanilla Wafers  ½ cup low fat, no added sugar ice cream or frozen yogurt (Blue bell, Blue Bunny, Weight Watchers, Skinny Cow)  ½ turkey, ham, or chicken sandwich  ½ c fruit with ½ c Cottage cheese  4-6 unsalted wheat crackers with 1 oz low fat cheese or 1 tbsp peanut butter   30-45 goldfish crackers (depending on flavor)   7-8 Hindu mini brown rice cakes (caramel, apple cinnamon, chocolate)   12 Hindu mini brown rice cakes (cheddar, bbq, ranch)   1/3 cup hummus dip with raw veg  1/2 whole wheat chelsie, 1Tbsp hummus  Mini Pizza (1/2 whole  wheat English muffin, low-fat  cheese, tomato sauce)  100 calorie snack pack (Oreo, Chips Ahoy, Ritz Mix, Baked Cheetos)  4-6 oz. light or Greek Style yogurt (Chobani, Yoplait, Okios, [place])  ½ cup sugar-free pudding    6 in. wheat tortilla or chelsie oven toasted chips (topped with spray butter flavoring, cinnamon, Truvia OR spray butter, garlic powder, chili powder)   18 BBQ Popchips (available at [employer], [employer], [employer])

## 2024-12-04 ENCOUNTER — LAB VISIT (OUTPATIENT)
Dept: LAB | Facility: HOSPITAL | Age: 59
End: 2024-12-04
Payer: COMMERCIAL

## 2024-12-04 DIAGNOSIS — E11.9 TYPE 2 DIABETES MELLITUS WITHOUT COMPLICATION, WITHOUT LONG-TERM CURRENT USE OF INSULIN: ICD-10-CM

## 2024-12-04 LAB
ALBUMIN SERPL BCP-MCNC: 4.3 G/DL (ref 3.5–5.2)
ALP SERPL-CCNC: 96 U/L (ref 40–150)
ALT SERPL W/O P-5'-P-CCNC: 24 U/L (ref 10–44)
ANION GAP SERPL CALC-SCNC: 12 MMOL/L (ref 8–16)
AST SERPL-CCNC: 26 U/L (ref 10–40)
BILIRUB SERPL-MCNC: 1 MG/DL (ref 0.1–1)
BUN SERPL-MCNC: 8 MG/DL (ref 6–20)
CALCIUM SERPL-MCNC: 9.9 MG/DL (ref 8.7–10.5)
CHLORIDE SERPL-SCNC: 104 MMOL/L (ref 95–110)
CO2 SERPL-SCNC: 24 MMOL/L (ref 23–29)
CREAT SERPL-MCNC: 0.8 MG/DL (ref 0.5–1.4)
EST. GFR  (NO RACE VARIABLE): >60 ML/MIN/1.73 M^2
ESTIMATED AVG GLUCOSE: 217 MG/DL (ref 68–131)
ESTIMATED AVG GLUCOSE: 217 MG/DL (ref 68–131)
GLUCOSE SERPL-MCNC: 175 MG/DL (ref 70–110)
HBA1C MFR BLD: 9.2 % (ref 4–5.6)
HBA1C MFR BLD: 9.2 % (ref 4–5.6)
POTASSIUM SERPL-SCNC: 4 MMOL/L (ref 3.5–5.1)
PROT SERPL-MCNC: 7.7 G/DL (ref 6–8.4)
SODIUM SERPL-SCNC: 140 MMOL/L (ref 136–145)

## 2024-12-04 PROCEDURE — 80053 COMPREHEN METABOLIC PANEL: CPT | Performed by: INTERNAL MEDICINE

## 2024-12-04 PROCEDURE — 83036 HEMOGLOBIN GLYCOSYLATED A1C: CPT | Performed by: NURSE PRACTITIONER

## 2024-12-04 PROCEDURE — 83036 HEMOGLOBIN GLYCOSYLATED A1C: CPT | Mod: 91 | Performed by: INTERNAL MEDICINE

## 2024-12-09 ENCOUNTER — PATIENT MESSAGE (OUTPATIENT)
Dept: INTERNAL MEDICINE | Facility: CLINIC | Age: 59
End: 2024-12-09
Payer: COMMERCIAL

## 2024-12-18 ENCOUNTER — OFFICE VISIT (OUTPATIENT)
Dept: INTERNAL MEDICINE | Facility: CLINIC | Age: 59
End: 2024-12-18
Payer: COMMERCIAL

## 2024-12-18 VITALS
WEIGHT: 163.13 LBS | HEART RATE: 80 BPM | HEIGHT: 69 IN | BODY MASS INDEX: 24.16 KG/M2 | DIASTOLIC BLOOD PRESSURE: 70 MMHG | SYSTOLIC BLOOD PRESSURE: 114 MMHG | OXYGEN SATURATION: 98 %

## 2024-12-18 DIAGNOSIS — E11.65 TYPE 2 DIABETES MELLITUS WITH HYPERGLYCEMIA, WITHOUT LONG-TERM CURRENT USE OF INSULIN: ICD-10-CM

## 2024-12-18 DIAGNOSIS — I10 ESSENTIAL HYPERTENSION: Primary | ICD-10-CM

## 2024-12-18 PROCEDURE — 3074F SYST BP LT 130 MM HG: CPT | Mod: CPTII,S$GLB,, | Performed by: INTERNAL MEDICINE

## 2024-12-18 PROCEDURE — 3046F HEMOGLOBIN A1C LEVEL >9.0%: CPT | Mod: CPTII,S$GLB,, | Performed by: INTERNAL MEDICINE

## 2024-12-18 PROCEDURE — 99999 PR PBB SHADOW E&M-EST. PATIENT-LVL IV: CPT | Mod: PBBFAC,,, | Performed by: INTERNAL MEDICINE

## 2024-12-18 PROCEDURE — 3008F BODY MASS INDEX DOCD: CPT | Mod: CPTII,S$GLB,, | Performed by: INTERNAL MEDICINE

## 2024-12-18 PROCEDURE — 3078F DIAST BP <80 MM HG: CPT | Mod: CPTII,S$GLB,, | Performed by: INTERNAL MEDICINE

## 2024-12-18 PROCEDURE — 99214 OFFICE O/P EST MOD 30 MIN: CPT | Mod: S$GLB,,, | Performed by: INTERNAL MEDICINE

## 2024-12-18 PROCEDURE — 1160F RVW MEDS BY RX/DR IN RCRD: CPT | Mod: CPTII,S$GLB,, | Performed by: INTERNAL MEDICINE

## 2024-12-18 PROCEDURE — 1159F MED LIST DOCD IN RCRD: CPT | Mod: CPTII,S$GLB,, | Performed by: INTERNAL MEDICINE

## 2024-12-18 PROCEDURE — 4010F ACE/ARB THERAPY RXD/TAKEN: CPT | Mod: CPTII,S$GLB,, | Performed by: INTERNAL MEDICINE

## 2024-12-18 PROCEDURE — 3066F NEPHROPATHY DOC TX: CPT | Mod: CPTII,S$GLB,, | Performed by: INTERNAL MEDICINE

## 2024-12-18 PROCEDURE — 3061F NEG MICROALBUMINURIA REV: CPT | Mod: CPTII,S$GLB,, | Performed by: INTERNAL MEDICINE

## 2024-12-18 RX ORDER — SEMAGLUTIDE 0.68 MG/ML
0.25 INJECTION, SOLUTION SUBCUTANEOUS
Qty: 3 ML | Refills: 1 | Status: SHIPPED | OUTPATIENT
Start: 2024-12-18

## 2024-12-18 NOTE — PROGRESS NOTES
CC:  Follow-up of diabetes     HPI:  The patient is a 59-year-old female with type 2 diabetes, hypertension, hyperlipidemia, depression, migraine headaches carcinoid tumor of the duodenum who presents today for follow-up of diabetes.  She was seen by Ms. Correia.  Her metformin was increased to 500 mg b.I.d..  He does check her blood sugars in the morning and they have been ranging between 180 and 210.  Her A1c came back at 9.2.    ROS:  When the patient was last seen, she had gingival swelling and redness.  She attributes this to the tooth the patient is using.  She switch brands with improvement of symptoms.  She does report having a grandmother with diabetes.    Physical exam:   General appearance: No acute distress  HEENT: Trachea is midline without JVD   Pulmonary: Good inspiratory, expiratory breath sounds are heard.  Lungs are clear to auscultation.    Cardiovascular:  S1-S2, rhythm is regular.  Extremities without edema.  GI: Abdomen is nontender, nondistended without hepatosplenomegaly  Comments:  Did discuss GLP-1, DPP-4, sulfonylureas, SGLT2 with the patient.  I would like to try a GLP 1 1st.  Assessment:    Type 2 diabetes not optimally controlled   2.  Hypertension currently stable  Plan:    Will start the patient on Ozempic 0.25 mg  2.  No other medication changes at this time   3.  The patient has follow-up in 3 months pending approval of medication.

## 2024-12-20 ENCOUNTER — PATIENT MESSAGE (OUTPATIENT)
Dept: INTERNAL MEDICINE | Facility: CLINIC | Age: 59
End: 2024-12-20
Payer: COMMERCIAL

## 2024-12-30 ENCOUNTER — OFFICE VISIT (OUTPATIENT)
Dept: HEPATOLOGY | Facility: CLINIC | Age: 59
End: 2024-12-30
Payer: COMMERCIAL

## 2024-12-30 ENCOUNTER — TELEPHONE (OUTPATIENT)
Dept: INTERNAL MEDICINE | Facility: CLINIC | Age: 59
End: 2024-12-30
Payer: COMMERCIAL

## 2024-12-30 VITALS — WEIGHT: 163 LBS | HEIGHT: 69 IN | BODY MASS INDEX: 24.14 KG/M2

## 2024-12-30 DIAGNOSIS — E78.2 MIXED HYPERLIPIDEMIA: ICD-10-CM

## 2024-12-30 DIAGNOSIS — R16.0 HEPATOMEGALY: ICD-10-CM

## 2024-12-30 DIAGNOSIS — K76.0 HEPATIC STEATOSIS: Primary | ICD-10-CM

## 2024-12-30 DIAGNOSIS — I10 ESSENTIAL HYPERTENSION: ICD-10-CM

## 2024-12-30 DIAGNOSIS — E11.9 TYPE 2 DIABETES MELLITUS WITHOUT COMPLICATION, WITHOUT LONG-TERM CURRENT USE OF INSULIN: ICD-10-CM

## 2024-12-30 PROCEDURE — 99999 PR PBB SHADOW E&M-EST. PATIENT-LVL IV: CPT | Mod: PBBFAC,,,

## 2024-12-30 RX ORDER — ESTRADIOL AND NORETHINDRONE ACETATE .5; .1 MG/1; MG/1
1 TABLET ORAL
COMMUNITY
Start: 2024-07-08

## 2024-12-30 NOTE — PROGRESS NOTES
[facility] - New Patient    REFERRING PROVIDER:  Dr. Jen Brown  PCP: Keith Freeman MD     Chief Complaint:  hepatomegaly     HISTORY       HPI: This is a 59 y.o. patient with PMH noted below, presenting for evaluation of hepatomegaly.    Hx of NET of duodenum. Followed by Dr. Brown in oncology and surv with yearly scopes.    Previous serologic w/u negative for viral hepatitis.    Prior serologic workup:   Lab Results   Component Value Date    HEPBSAG Non-reactive 09/23/2023    HEPCAB Non-reactive 09/23/2023    HEPAIGM Non-reactive 09/23/2023         Interval HPI: Presents today alone. States that she was referred due to her enlarged liver. Did have a liver bx in 2018 and was told that she had fatty liver at that time. States that just recently has been struggling with BG and also some frequent N/V. Is not able to pinpoint the cause of the N/V and it usually only lasts for 1 day when it does occur, which is only about once a month. Her BG has become very uncontrolled and she is unsure of why. She has not started the ozempic yet because she wanted to clarify if she should continue the metformin.    Diet: no SSB, no dessert, not big on carbs  Exercise: none  Supplements: none    Risk factors for fatty liver include:    HTN - managed with medication  HLD - rosuvastatin 5 mg  T2DM - last A1C 9.2      LABS & DIAGNOSTIC STUDIES        Labs done 12/2024 show normal transaminase levels   Platelets wnl, alk phos wnl  Synthetic liver functioning wnl    Lab Results   Component Value Date    ALT 24 12/04/2024    AST 26 12/04/2024    ALKPHOS 96 12/04/2024    BILITOT 1.0 12/04/2024    ALBUMIN 4.3 12/04/2024     08/14/2024       Imaging:  Chest abd pelvis CT 8/2024 noted  FINDINGS:  CHEST:     Lower neck and thoracic soft tissues: No significant abnormality.     Vasculature: Left sided aortic arch. Thoracic aorta is nonaneurysmal. No atherosclerosis of the thoracic aorta. Main pulmonary artery normal in  diameter.     Heart: Normal size. No significant atherosclerosis of the coronary arteries. No pericardial effusion.     Airways: Central airways are clear.     Lungs/Pleura: Lungs are symmetrically expanded. 2 mm nodule in the left lung (series 6, image 219).  No focal consolidation. No pleural effusion or pneumothorax.     Hilum/Mediastinum: No hilar or mediastinal lymph node enlargement.     Esophagus: No significant abnormality.     ABDOMEN/PELVIS:     Liver: Enlarged 19.4 cm slightly increased in size compared April 2023.  No focal abnormality.     Gallbladder: Surgically absent.     Bile ducts: Extrahepatic biliary ductal dilatation, similar to prior, which can be seen in the setting of prior cholecystectomy.  No intrahepatic ductal dilatation.     Spleen: Normal size.     Pancreas: No mass or ductal dilatation.  No peripancreatic fat stranding.     Adrenals: No significant abnormality.     Renal/Ureters: The kidneys enhance symmetrically.  No hydroureteronephrosis or stones.  The urinary bladder wall is symmetrically thickened, likely due to underdistention.     Reproductive: Uterus is without significant abnormality. No adnexal mass.     Stomach/Bowel: Stomach is unremarkable.  No evidence of obstruction or inflammatory changes.  Appendix is unremarkable.     Peritoneum: No free fluid. No intraperitoneal free air.     Lymph Nodes: No pathologic valerie enlargement in the abdomen or pelvis.     Vasculature: Abdominal aorta tapers normally.  No atherosclerosis of the abdominal aorta and its branches.  Portal vasculature, SMV, and splenic vein are patent.     Bones: Degenerative changes of the spine.  No osseous destructive changes.     Soft Tissues: No significant abnormality.     Impression:     1. No convincing local recurrence or metastatic disease.  2. 2 mm left lung nodule.  For a solid nodule <6 mm, Fleischner Society 2017 guidelines recommend no routine follow up for a low risk patient, or follow-up with  "non-contrast chest CT at 12 months in a high risk patient.  3. Hepatomegaly slightly increased compared to prior.  4. Additional findings as above.       Liver fibrosis staging:  -- fibroscan - will obtain      Intermittent Alcohol consumption, see below  Social History     Substance and Sexual Activity   Alcohol Use Yes    Comment: 3-4 times a year       Immunity to Hep A and B - will check with next labs     Denies family history of liver disease.        Allergy and medication list reviewed and updated     PMHX:  has a past medical history of Diabetes mellitus, type 2, HTN (hypertension), Hyperlipidemia, and Migraine.    PSHX:  has a past surgical history that includes  section; Liver biopsy; Laparoscopic cholecystectomy (N/A, 2019); Cholecystectomy; and Diagnostic laparoscopy (Right, 2020).    FAMILY HISTORY: Updated and reviewed in EPIC    ROS:   GENERAL: Denies fatigue  CARDIOVASCULAR: Denies edema  GI: Denies abdominal pain  SKIN: Denies rash, itching   NEURO: Denies confusion, memory loss, or mood changes    PHYSICAL EXAM:   In no acute distress; alert and oriented to person, place and time  VITALS: Ht 5' 9" (1.753 m)   Wt 73.9 kg (163 lb)   BMI 24.07 kg/m²   EYES: Sclerae anicteric  GI: Soft, non-tender, non-distended. No ascites.  EXTREMITIES:  No edema.  SKIN: Warm and dry. No jaundice. No telangectasias noted. No palmar erythema.  NEURO:  No asterixis.  PSYCH:  Thought and speech pattern appropriate. Behavior normal          ASSESSMENT & PLAN        EDUCATION:  See instructions discussed with patient in Instructions section of the After Visit Summary     ASSESSMENT & PLAN:  59 y.o. female with:  1.  Fatty liver, likely related to metabolic risk factors HTN, HLD, T2DM   - see HPI  -- Immunity to Hep A and B : see HPI  -- CT scans have noted chronic hepatomegaly, likely related to hepatic steatosis  -- Fibroscan with RTC  -- Recommendations discussed with patient:  Limit alcohol " consumption, no more than 1 serving(s) of alcohol in any day (1 serving is 5 ounces of wine, 12 ounces of beer, or 1.5 ounces of liquor) and do NOT recommend any daily alcohol use    2 Maintain healthy melonie  3. Low carb/sugar, high fiber and protein diet, limit your carb intake to LESS than 30-45 grams of carbs with a meal or LESS than 5-10 grams with any snack   4. Exercise, 5 days per week, 30 minutes per day, as tolerated  5. Recommend good cholesterol, blood pressure, blood sugar levels   6. There is a new medication called Rezdiffra for the treatment of F2-F3 liver scarring due to fatty liver. It is only indicated for those with stage 2 or 3 scar tissue (will discuss after fibroscan)    2.  HTN, HLD, T2DM   -- Body mass index is 24.07 kg/m².   -- increases risk for fatty liver      Follow up in about 2 months (around 2/28/2025). with fibroscan before  Orders Placed This Encounter   Procedures    FibroScan Transplant Hepatology(Vibration Controlled Transient Elastography)        Thank you for allowing me to participate in the care of CAROLYN Dudley, FNP-C  Nurse Practitioner  [facility]  [facility]     I spent a total of 45 minutes on the day of the visit.This includes face to face time and non-face to face time preparing to see the patient (eg, review of tests), obtaining and/or reviewing separately obtained history, documenting clinical information in the electronic or other health record, independently interpreting results and communicating results to the patient/family/caregiver, and coordinating care.         CC'ed note to:   Jen Brown MD Stoll, James D., MD

## 2024-12-30 NOTE — PATIENT INSTRUCTIONS
1. Fibroscan to look for fat or scar tissue in the liver with return to clinic   2.  Follow up in 2 months and fibroscan before    FATTY LIVER:  These things are important to improve fatty liver:  Limit alcohol consumption, no more than 1 serving(s) of alcohol in any day (1 serving is 5 ounces of wine, 12 ounces of beer, or 1.5 ounces of liquor) and do NOT recommend any daily alcohol use    Maintain healthy weight  Avoid processed foods. No fried/fast foods. No sugary drinks or drinks with any calories.    Low carb/sugar and high protein diet (90 grams per day of protein).Try to limit your carb intake to LESS than  grams per day total. Use MyFitness Pal or Lose It carie to add up your carbs through the day. Do NOT drink any beverages with calories or carbs (this can lead to high blood sugar and weight gain). The main thing to focus on is high protein, low carb)  Exercise, 5 days per week, 30 minutes per day, as tolerated  Recommend good cholesterol, blood pressure, blood sugar levels   There is a new medication called Rezdiffra for the treatment of F2-F3 liver scarring due to fatty liver. It is only indicated for patients with significant scar tissue from fatty liver (will reassess after fibroscan)    In some people, fatty liver can progress to steatohepatitis (inflamatory fatty liver) and possibly to cirrhosis, putting one at increased risk for liver cancer, liver failure, and death. Therefore, the lifestyle changes are very important to decrease this risk.     Helpful website for AROLDO/fatty liver: [email or web address]

## 2025-01-01 DIAGNOSIS — R11.2 NAUSEA AND VOMITING, UNSPECIFIED VOMITING TYPE: Primary | ICD-10-CM

## 2025-01-13 ENCOUNTER — TELEPHONE (OUTPATIENT)
Dept: HEMATOLOGY/ONCOLOGY | Facility: CLINIC | Age: 60
End: 2025-01-13
Payer: COMMERCIAL

## 2025-01-13 NOTE — TELEPHONE ENCOUNTER
----- Message from Quita sent at 1/13/2025  2:20 PM CST -----  Regarding: Appt Request  Scheduling Request           Appt Type:Labs/ EP     Date/Time Preference:Morning appt time     Treating Provider:  Jen Brown MD        Caller Name:CHRIS RAND     Contact Preference:[phone]     Comments/notes:Patient called to schedule f/u for March

## 2025-01-15 ENCOUNTER — TELEPHONE (OUTPATIENT)
Dept: ENDOSCOPY | Facility: HOSPITAL | Age: 60
End: 2025-01-15
Payer: COMMERCIAL

## 2025-01-15 DIAGNOSIS — D3A.010 CARCINOID TUMOR OF DUODENUM, UNSPECIFIED WHETHER MALIGNANT: Primary | ICD-10-CM

## 2025-01-15 NOTE — TELEPHONE ENCOUNTER
Referral for procedure from [facility]      Spoke to patient to schedule procedure(s) Upper Endoscopy Ultrasound (EUS)       Physician to perform procedure(s) Dr. SUE Valenzuela  Date of Procedure (s) 3/17/25  Arrival Time 07:30 AM  Time of Procedure(s) 08:30 AM   Location of Procedure(s) [address]  Type of Rx Prep sent to patient: N/A  Instructions provided to patient via [facility]postal     Patient was informed on the following information and verbalized understanding. Screening questionnaire reviewed with patient and complete. If procedure requires anesthesia, a responsible adult needs to be present to accompany the patient home, patient cannot drive after receiving anesthesia. Appointment details are tentative, especially check-in time. Patient will receive a prep-op call 7 days prior to confirm check-in time for procedure. If applicable the patient should contact their pharmacy to verify Rx for procedure prep is ready for pick-up. Patient was advised to call the scheduling department at [phone] if pharmacy states no Rx is available. Patient was advised to call the endoscopy scheduling department if any questions or concerns arise.      [facility] Scheduling Department

## 2025-01-16 NOTE — TELEPHONE ENCOUNTER
Patient notified via phone call that  Ozempic needs to be stopped on 3/09/25. Written instructions were changed before mailing to patient.

## 2025-02-20 ENCOUNTER — PATIENT MESSAGE (OUTPATIENT)
Dept: INTERNAL MEDICINE | Facility: CLINIC | Age: 60
End: 2025-02-20
Payer: COMMERCIAL

## 2025-02-20 DIAGNOSIS — E11.65 TYPE 2 DIABETES MELLITUS WITH HYPERGLYCEMIA, WITHOUT LONG-TERM CURRENT USE OF INSULIN: ICD-10-CM

## 2025-02-21 RX ORDER — SEMAGLUTIDE 0.68 MG/ML
0.5 INJECTION, SOLUTION SUBCUTANEOUS
Qty: 3 ML | Refills: 0 | Status: SHIPPED | OUTPATIENT
Start: 2025-02-21

## 2025-02-21 NOTE — TELEPHONE ENCOUNTER
No care due was identified.  [facility] Embedded Care Due Messages. Reference number: [id number].   2/21/2025 12:19:49 PM CST

## 2025-02-21 NOTE — TELEPHONE ENCOUNTER
LOV with Keith Freeman MD , 12/18/2024  Requesting a dosage increase. Tolerating current dosage of 0.25 well with no report SE

## 2025-02-26 ENCOUNTER — OFFICE VISIT (OUTPATIENT)
Dept: HEPATOLOGY | Facility: CLINIC | Age: 60
End: 2025-02-26
Payer: COMMERCIAL

## 2025-02-26 ENCOUNTER — PROCEDURE VISIT (OUTPATIENT)
Dept: HEPATOLOGY | Facility: CLINIC | Age: 60
End: 2025-02-26
Payer: COMMERCIAL

## 2025-02-26 ENCOUNTER — TELEPHONE (OUTPATIENT)
Dept: INFUSION THERAPY | Facility: HOSPITAL | Age: 60
End: 2025-02-26
Payer: COMMERCIAL

## 2025-02-26 VITALS — HEIGHT: 69 IN | WEIGHT: 160.5 LBS | BODY MASS INDEX: 23.77 KG/M2

## 2025-02-26 DIAGNOSIS — I10 ESSENTIAL HYPERTENSION: ICD-10-CM

## 2025-02-26 DIAGNOSIS — R16.0 HEPATOMEGALY: ICD-10-CM

## 2025-02-26 DIAGNOSIS — E11.9 TYPE 2 DIABETES MELLITUS WITHOUT COMPLICATION, WITHOUT LONG-TERM CURRENT USE OF INSULIN: ICD-10-CM

## 2025-02-26 DIAGNOSIS — E78.2 MIXED HYPERLIPIDEMIA: ICD-10-CM

## 2025-02-26 DIAGNOSIS — K76.0 HEPATIC STEATOSIS: Primary | ICD-10-CM

## 2025-02-26 PROCEDURE — 1159F MED LIST DOCD IN RCRD: CPT | Mod: CPTII,S$GLB,,

## 2025-02-26 PROCEDURE — 99999 PR PBB SHADOW E&M-EST. PATIENT-LVL III: CPT | Mod: PBBFAC,,,

## 2025-02-26 PROCEDURE — 99215 OFFICE O/P EST HI 40 MIN: CPT | Mod: S$GLB,,,

## 2025-02-26 PROCEDURE — 1160F RVW MEDS BY RX/DR IN RCRD: CPT | Mod: CPTII,S$GLB,,

## 2025-02-26 PROCEDURE — 3008F BODY MASS INDEX DOCD: CPT | Mod: CPTII,S$GLB,,

## 2025-02-26 PROCEDURE — 91200 LIVER ELASTOGRAPHY: CPT | Mod: S$GLB,,,

## 2025-02-26 NOTE — PROGRESS NOTES
[facility] - Est Patient    PCP: Keith Freeman MD     Chief Complaint:  hepatomegaly     HISTORY       HPI: This is a 59 y.o. patient with PMH noted below, presenting for follow up of fatty liver.    Hx of NET of duodenum. Followed by Dr. Brown in oncology and surv with yearly scopes.    Previous serologic w/u negative for viral hepatitis.    Prior serologic workup:   Lab Results   Component Value Date    HEPBSAG Non-reactive 09/23/2023    HEPCAB Non-reactive 09/23/2023    HEPAIGM Non-reactive 09/23/2023         Interval HPI: Presents today alone. No new complaints since last visit. Stated that she started ozempic since the last visit and is continuing metformin as well to try to get BG better controlled. Diet wise, she does not feel like she does bad so it can be confusing as to why her BG is not better than it is. Reports that she does check it regularly and her fasting BG has been better since starting ozempic. Has also lost a little weight, is Currently 160#.     Diet: no SSB, no dessert, not big on carbs - trying to control   Exercise: none  Supplements: none    Risk factors for fatty liver include:    HTN - managed with medication  HLD - rosuvastatin 5 mg  T2DM - last A1C 9.2, ozempic & metformin       LABS & DIAGNOSTIC STUDIES      Labs done 12/2024 show normal transaminase levels   Platelets wnl, alk phos wnl  Synthetic liver functioning wnl    Lab Results   Component Value Date    ALT 24 12/04/2024    AST 26 12/04/2024    ALKPHOS 96 12/04/2024    BILITOT 1.0 12/04/2024    ALBUMIN 4.3 12/04/2024     08/14/2024       Imaging:  Chest abd pelvis CT 8/2024 noted  FINDINGS:  CHEST:     Lower neck and thoracic soft tissues: No significant abnormality.     Vasculature: Left sided aortic arch. Thoracic aorta is nonaneurysmal. No atherosclerosis of the thoracic aorta. Main pulmonary artery normal in diameter.     Heart: Normal size. No significant atherosclerosis of the coronary arteries. No  pericardial effusion.     Airways: Central airways are clear.     Lungs/Pleura: Lungs are symmetrically expanded. 2 mm nodule in the left lung (series 6, image 219).  No focal consolidation. No pleural effusion or pneumothorax.     Hilum/Mediastinum: No hilar or mediastinal lymph node enlargement.     Esophagus: No significant abnormality.     ABDOMEN/PELVIS:     Liver: Enlarged 19.4 cm slightly increased in size compared April 2023.  No focal abnormality.     Gallbladder: Surgically absent.     Bile ducts: Extrahepatic biliary ductal dilatation, similar to prior, which can be seen in the setting of prior cholecystectomy.  No intrahepatic ductal dilatation.     Spleen: Normal size.     Pancreas: No mass or ductal dilatation.  No peripancreatic fat stranding.     Adrenals: No significant abnormality.     Renal/Ureters: The kidneys enhance symmetrically.  No hydroureteronephrosis or stones.  The urinary bladder wall is symmetrically thickened, likely due to underdistention.     Reproductive: Uterus is without significant abnormality. No adnexal mass.     Stomach/Bowel: Stomach is unremarkable.  No evidence of obstruction or inflammatory changes.  Appendix is unremarkable.     Peritoneum: No free fluid. No intraperitoneal free air.     Lymph Nodes: No pathologic valerie enlargement in the abdomen or pelvis.     Vasculature: Abdominal aorta tapers normally.  No atherosclerosis of the abdominal aorta and its branches.  Portal vasculature, SMV, and splenic vein are patent.     Bones: Degenerative changes of the spine.  No osseous destructive changes.     Soft Tissues: No significant abnormality.     Impression:     1. No convincing local recurrence or metastatic disease.  2. 2 mm left lung nodule.  For a solid nodule <6 mm, Fleischner Society 2017 guidelines recommend no routine follow up for a low risk patient, or follow-up with non-contrast chest CT at 12 months in a high risk patient.  3. Hepatomegaly slightly increased  "compared to prior.  4. Additional findings as above.       Liver fibrosis staging:  -- Fibroscan 2025 noted S3, F0-1 (, kPa 4.5)     Intermittent Alcohol consumption, see below  Social History     Substance and Sexual Activity   Alcohol Use Yes    Comment: 3-4 times a year       Immunity to Hep A and B - will check with next labs     Denies family history of liver disease.        Allergy and medication list reviewed and updated     PMHX:  has a past medical history of Diabetes mellitus, type 2, HTN (hypertension), Hyperlipidemia, and Migraine.    PSHX:  has a past surgical history that includes  section; Liver biopsy; Laparoscopic cholecystectomy (N/A, 2019); Cholecystectomy; and Diagnostic laparoscopy (Right, 2020).    FAMILY HISTORY: Updated and reviewed in EPIC    ROS:   GENERAL: Denies fatigue  CARDIOVASCULAR: Denies edema  GI: Denies abdominal pain  SKIN: Denies rash, itching   NEURO: Denies confusion, memory loss, or mood changes    PHYSICAL EXAM:   In no acute distress; alert and oriented to person, place and time  VITALS: Ht 5' 9" (1.753 m)   Wt 72.8 kg (160 lb 7.9 oz)   BMI 23.70 kg/m²   EYES: Sclerae anicteric  GI: Soft, non-tender, non-distended. No ascites.  EXTREMITIES:  No edema.  SKIN: Warm and dry. No jaundice. No telangectasias noted. No palmar erythema.  NEURO:  No asterixis.  PSYCH:  Thought and speech pattern appropriate. Behavior normal          ASSESSMENT & PLAN        EDUCATION:  See instructions discussed with patient in Instructions section of the After Visit Summary     ASSESSMENT & PLAN:  59 y.o. female with:  1.  Fatty liver, likely related to metabolic risk factors HTN, HLD, T2DM   - see HPI  -- Immunity to Hep A and B : see HPI  -- CT scans have noted chronic hepatomegaly, likely related to hepatic steatosis  -- Fibroscan 2025 noted severe steatosis and no fibrosis  -- will repeat fibroscan in 2 years to monitor  -- Recommendations discussed with " patient:  Limit alcohol consumption, no more than 1 serving(s) of alcohol in any day (1 serving is 5 ounces of wine, 12 ounces of beer, or 1.5 ounces of liquor) and do NOT recommend any daily alcohol use    2 Maintain healthy melonie  3. Low carb/sugar, high fiber and protein diet, limit your carb intake to LESS than 30-45 grams of carbs with a meal or LESS than 5-10 grams with any snack   4. Exercise, 5 days per week, 30 minutes per day, as tolerated  5. Recommend good cholesterol, blood pressure, blood sugar levels   6. There is a new medication called Rezdiffra for the treatment of F2-F3 liver scarring due to fatty liver. It is only indicated for those with stage 2 or 3 scar tissue (will discuss after fibroscan)    2.  HTN, HLD, T2DM   -- Body mass index is 23.7 kg/m².   -- increases risk for fatty liver      Follow up in about 2 years (around 2/26/2027). With labs and fibroscan before  No orders of the defined types were placed in this encounter.       Thank you for allowing me to participate in the care of CAROLYN Dudley, FNP-C  Nurse Practitioner  [facility]  [facility]     I spent a total of 40 minutes on the day of the visit.This includes face to face time and non-face to face time preparing to see the patient (eg, review of tests), obtaining and/or reviewing separately obtained history, documenting clinical information in the electronic or other health record, independently interpreting results and communicating results to the patient/family/caregiver, and coordinating care.         CC'ed note to:   Keith Freeman MD

## 2025-02-26 NOTE — TELEPHONE ENCOUNTER
----- Message from Jerrell Frazier NP sent at 2/26/2025 12:20 PM CST -----  Please enter recall 1. Follow up in 2 years with fibroscan same day, labs 1 week prior

## 2025-02-26 NOTE — PROCEDURES
FibroScan Transplant Hepatology(Vibration Controlled Transient Elastography)    Date/Time: 2/26/2025 9:45 AM    Performed by: Jerrell Frazier NP  Authorized by: Jerrell Frazier NP    Diagnosis:  NAFLD    Probe:  M    Universal Protocol: Patient's identity, procedure and site were verified, confirmatory pause was performed.  Discussed procedure including risks and potential complications.  Questions answered.  Patient verbalizes understanding and wishes to proceed with VCTE.     Procedure: After providing explanations of the procedure, patient was placed in the supine position with right arm in maximum abduction to allow optimal exposure of right lateral abdomen.  Patient was briefly assessed, Testing was performed in the mid-axillary location, 50Hz Shear Wave pulses were applied and the resulting Shear Wave and Propagation Speed detected with a 3.5 MHz ultrasonic signal, using the FibroScan probe, Skin to liver capsule distance and liver parenchyma were accessed during the entire examination with the FibroScan probe, Patient was instructed to breathe normally and to abstain from sudden movements during the procedure, allowing for random measurements of liver stiffness. At least 10 Shear Waves were produced, Individual measurements of each Shear Wave were calculated.  Patient tolerated the procedure well with no complications.  Meets discharge criteria as was dismissed.  Rates pain 0 out of 10.  Patient will follow up with ordering provider to review results.    Findings  Median liver stiffness score:  4.5  CAP Reading: dB/m:  309    IQR/med %:  6  Interpretation  Fibrosis interpretation is based on medial liver stiffness - Kilopascal (kPa).    Fibrosis Stage:  F 0-1  Steatosis interpretation is based on controlled attenuation parameter - (dB/m).    Steatosis Grade:  S3

## 2025-02-26 NOTE — PATIENT INSTRUCTIONS
1. Fibroscan to look for fat or scar tissue in the liver showed >67% fat in the liver and no fibrosis  2. Recommend vaccines for Hepatitis  A and B if you have not completed them in the past, see below   3. Follow up in 2 year with labs and fibroscan before    FATTY LIVER:  These things are important to improve fatty liver:  Limit alcohol consumption, no more than 1 serving(s) of alcohol in any day (1 serving is 5 ounces of wine, 12 ounces of beer, or 1.5 ounces of liquor) and do NOT recommend any daily alcohol use    2 Maintain healthy weight   3. [facility] offers benefits to patients so let me know if you want a referral to the [facility] gym. Also, [facility] has dieticians that can create a weight loss plan. If you are interested let me know and I can place a referral. If so, contact the dietician team at [facility] at email [email or web address] or call [phone].  to get scheduled. They do offer video visits   4. Avoid processed foods. No fried/fast foods. No sugary drinks or drinks with any calories.   5. Low carb/sugar and high protein diet (80 grams per day of protein).Try to limit your carb intake to LESS than  grams per day total. Use MyFitness Pal or Lose It carie to add up your carbs through the day. Do NOT drink any beverages with calories or carbs (this can lead to high blood sugar and weight gain). The main thing to focus on is high protein, low carb)  6. Exercise, 5 days per week, 30 minutes per day, as tolerated  7. Recommend good cholesterol, blood pressure, blood sugar levels   8. There is a new medication called Rezdiffra for the treatment of F2-F3 liver scarring due to fatty liver. It is only indicated for patients with significant scar tissue from fatty liver (not you currently)    In some people, fatty liver can progress to steatohepatitis (inflamatory fatty liver) and possibly to cirrhosis, increasing the risk for liver cancer, liver  failure, and death. Therefore, the lifestyle changes are very important to decrease this risk.     Helpful website for MAS/fatty liver: [email or web address]      HEP A/B VACCINE  The CDC recommends that all adults complete the combination Hepatitis A and B vaccine called TwinRix. This will protect your liver from these viruses, which can make your liver very sick.     Hep A can be transmitted through food and water and can cause significant liver injury. There was previously a significant Hepatitis A outbreak in [place]. Hep B vaccine is transmitted through blood or bodily fluids (there are no symptoms typically) and can develop longstanding (chronic) Hep B and there is no cure, so many people have it for life. Therefore, the vaccines provide immunity against these viruses that can cause harm to the liver.     The vaccine series is 3 vaccines: one now, one at 4 weeks and one 6 months after the 1st one. You can get it from any pharmacy but any [facility] pharmacy typically stocks it and administers it frequently      If the vaccine is not covered at the pharmacy level with your insurance, you may be able to get it with your PCP or in the infectious disease department at [facility] or from your [facility].

## 2025-03-10 ENCOUNTER — LAB VISIT (OUTPATIENT)
Dept: LAB | Facility: HOSPITAL | Age: 60
End: 2025-03-10
Attending: INTERNAL MEDICINE
Payer: COMMERCIAL

## 2025-03-10 DIAGNOSIS — C7A.010 MALIGNANT CARCINOID TUMOR OF DUODENUM: ICD-10-CM

## 2025-03-10 LAB
ALBUMIN SERPL BCP-MCNC: 4.2 G/DL (ref 3.5–5.2)
ALP SERPL-CCNC: 103 U/L (ref 40–150)
ALT SERPL W/O P-5'-P-CCNC: 17 U/L (ref 10–44)
ANION GAP SERPL CALC-SCNC: 8 MMOL/L (ref 8–16)
AST SERPL-CCNC: 30 U/L (ref 10–40)
BASOPHILS # BLD AUTO: 0.03 K/UL (ref 0–0.2)
BASOPHILS NFR BLD: 0.7 % (ref 0–1.9)
BILIRUB SERPL-MCNC: 0.7 MG/DL (ref 0.1–1)
BUN SERPL-MCNC: 10 MG/DL (ref 6–20)
CALCIUM SERPL-MCNC: 9.7 MG/DL (ref 8.7–10.5)
CHLORIDE SERPL-SCNC: 103 MMOL/L (ref 95–110)
CO2 SERPL-SCNC: 28 MMOL/L (ref 23–29)
CREAT SERPL-MCNC: 0.7 MG/DL (ref 0.5–1.4)
DIFFERENTIAL METHOD BLD: ABNORMAL
EOSINOPHIL # BLD AUTO: 0.2 K/UL (ref 0–0.5)
EOSINOPHIL NFR BLD: 3.5 % (ref 0–8)
ERYTHROCYTE [DISTWIDTH] IN BLOOD BY AUTOMATED COUNT: 13.2 % (ref 11.5–14.5)
EST. GFR  (NO RACE VARIABLE): >60 ML/MIN/1.73 M^2
GLUCOSE SERPL-MCNC: 141 MG/DL (ref 70–110)
HCT VFR BLD AUTO: 39.6 % (ref 37–48.5)
HGB BLD-MCNC: 12.7 G/DL (ref 12–16)
IMM GRANULOCYTES # BLD AUTO: 0 K/UL (ref 0–0.04)
IMM GRANULOCYTES NFR BLD AUTO: 0 % (ref 0–0.5)
LYMPHOCYTES # BLD AUTO: 2.2 K/UL (ref 1–4.8)
LYMPHOCYTES NFR BLD: 52.8 % (ref 18–48)
MCH RBC QN AUTO: 31.2 PG (ref 27–31)
MCHC RBC AUTO-ENTMCNC: 32.1 G/DL (ref 32–36)
MCV RBC AUTO: 97 FL (ref 82–98)
MONOCYTES # BLD AUTO: 0.4 K/UL (ref 0.3–1)
MONOCYTES NFR BLD: 9.9 % (ref 4–15)
NEUTROPHILS # BLD AUTO: 1.4 K/UL (ref 1.8–7.7)
NEUTROPHILS NFR BLD: 33.1 % (ref 38–73)
NRBC BLD-RTO: 0 /100 WBC
PLATELET # BLD AUTO: 195 K/UL (ref 150–450)
PLATELET BLD QL SMEAR: ABNORMAL
PMV BLD AUTO: 12.1 FL (ref 9.2–12.9)
POTASSIUM SERPL-SCNC: 4 MMOL/L (ref 3.5–5.1)
PROT SERPL-MCNC: 7.8 G/DL (ref 6–8.4)
RBC # BLD AUTO: 4.07 M/UL (ref 4–5.4)
SODIUM SERPL-SCNC: 139 MMOL/L (ref 136–145)
WBC # BLD AUTO: 4.24 K/UL (ref 3.9–12.7)

## 2025-03-10 PROCEDURE — 84260 ASSAY OF SEROTONIN: CPT | Performed by: INTERNAL MEDICINE

## 2025-03-10 PROCEDURE — 80053 COMPREHEN METABOLIC PANEL: CPT | Performed by: INTERNAL MEDICINE

## 2025-03-10 PROCEDURE — 83519 RIA NONANTIBODY: CPT | Performed by: INTERNAL MEDICINE

## 2025-03-10 PROCEDURE — 83497 ASSAY OF 5-HIAA: CPT | Performed by: INTERNAL MEDICINE

## 2025-03-10 PROCEDURE — 85025 COMPLETE CBC W/AUTO DIFF WBC: CPT | Performed by: INTERNAL MEDICINE

## 2025-03-13 LAB — SEROTONIN: <30 NG/ML

## 2025-03-14 ENCOUNTER — TELEPHONE (OUTPATIENT)
Dept: ENDOSCOPY | Facility: HOSPITAL | Age: 60
End: 2025-03-14
Payer: COMMERCIAL

## 2025-03-14 ENCOUNTER — ANESTHESIA EVENT (OUTPATIENT)
Dept: ENDOSCOPY | Facility: HOSPITAL | Age: 60
End: 2025-03-14
Payer: COMMERCIAL

## 2025-03-14 LAB — 5OH-INDOLEACETATE SERPL-MCNC: 8 NG/ML

## 2025-03-14 NOTE — TELEPHONE ENCOUNTER
Patient called in re: EUS on 03/17/25 at [facility]. Confirmed date and time. Reviewed instructions with patient. Reinforced stopping Ozempic on 03/09/25.  All questions answered. Patient verbalized understanding.

## 2025-03-15 LAB — PANCREASTATIN SERPL-MCNC: NORMAL PG/ML

## 2025-03-17 ENCOUNTER — ANESTHESIA (OUTPATIENT)
Dept: ENDOSCOPY | Facility: HOSPITAL | Age: 60
End: 2025-03-17
Payer: COMMERCIAL

## 2025-03-17 ENCOUNTER — HOSPITAL ENCOUNTER (OUTPATIENT)
Facility: HOSPITAL | Age: 60
Discharge: HOME | End: 2025-03-17
Attending: INTERNAL MEDICINE | Admitting: INTERNAL MEDICINE
Payer: COMMERCIAL

## 2025-03-17 VITALS
OXYGEN SATURATION: 99 % | TEMPERATURE: 98 F | RESPIRATION RATE: 20 BRPM | HEART RATE: 77 BPM | BODY MASS INDEX: 23.7 KG/M2 | DIASTOLIC BLOOD PRESSURE: 69 MMHG | WEIGHT: 160 LBS | SYSTOLIC BLOOD PRESSURE: 126 MMHG | HEIGHT: 69 IN

## 2025-03-17 DIAGNOSIS — C7A.010 MALIGNANT CARCINOID TUMOR OF DUODENUM: Primary | ICD-10-CM

## 2025-03-17 LAB
POCT GLUCOSE: 128 MG/DL (ref 70–110)
POCT GLUCOSE: 154 MG/DL (ref 70–110)

## 2025-03-17 PROCEDURE — 25000003 PHARM REV CODE 250: Performed by: NURSE ANESTHETIST, CERTIFIED REGISTERED

## 2025-03-17 PROCEDURE — 43259 EGD US EXAM DUODENUM/JEJUNUM: CPT | Mod: ,,, | Performed by: INTERNAL MEDICINE

## 2025-03-17 PROCEDURE — 37000009 HC ANESTHESIA EA ADD 15 MINS: Performed by: INTERNAL MEDICINE

## 2025-03-17 PROCEDURE — 37000008 HC ANESTHESIA 1ST 15 MINUTES: Performed by: INTERNAL MEDICINE

## 2025-03-17 PROCEDURE — 63600175 PHARM REV CODE 636 W HCPCS: Performed by: NURSE ANESTHETIST, CERTIFIED REGISTERED

## 2025-03-17 PROCEDURE — 43259 EGD US EXAM DUODENUM/JEJUNUM: CPT | Performed by: INTERNAL MEDICINE

## 2025-03-17 PROCEDURE — D9220A PRA ANESTHESIA: Mod: CRNA,,, | Performed by: NURSE ANESTHETIST, CERTIFIED REGISTERED

## 2025-03-17 PROCEDURE — 82962 GLUCOSE BLOOD TEST: CPT | Performed by: INTERNAL MEDICINE

## 2025-03-17 PROCEDURE — D9220A PRA ANESTHESIA: Mod: ANES,,, | Performed by: STUDENT IN AN ORGANIZED HEALTH CARE EDUCATION/TRAINING PROGRAM

## 2025-03-17 RX ORDER — PROPOFOL 10 MG/ML
VIAL (ML) INTRAVENOUS CONTINUOUS PRN
Status: DISCONTINUED | OUTPATIENT
Start: 2025-03-17 | End: 2025-03-17

## 2025-03-17 RX ORDER — SODIUM CHLORIDE 0.9 % (FLUSH) 0.9 %
10 SYRINGE (ML) INJECTION
OUTPATIENT
Start: 2025-03-17

## 2025-03-17 RX ORDER — SODIUM CHLORIDE 9 MG/ML
INJECTION, SOLUTION INTRAVENOUS CONTINUOUS
Status: DISCONTINUED | OUTPATIENT
Start: 2025-03-17 | End: 2025-03-17 | Stop reason: HOSPADM

## 2025-03-17 RX ORDER — OCTREOTIDE ACETATE 500 UG/ML
500 INJECTION, SOLUTION INTRAVENOUS; SUBCUTANEOUS ONCE
Status: DISCONTINUED | OUTPATIENT
Start: 2025-03-17 | End: 2025-03-17 | Stop reason: HOSPADM

## 2025-03-17 RX ORDER — GLUCAGON 1 MG
1 KIT INJECTION
OUTPATIENT
Start: 2025-03-17

## 2025-03-17 RX ORDER — DEXMEDETOMIDINE HYDROCHLORIDE 100 UG/ML
INJECTION, SOLUTION INTRAVENOUS
Status: DISCONTINUED | OUTPATIENT
Start: 2025-03-17 | End: 2025-03-17

## 2025-03-17 RX ADMIN — SODIUM CHLORIDE: 0.9 INJECTION, SOLUTION INTRAVENOUS at 08:03

## 2025-03-17 RX ADMIN — DEXMEDETOMIDINE 12 MCG: 100 INJECTION, SOLUTION, CONCENTRATE INTRAVENOUS at 08:03

## 2025-03-17 RX ADMIN — PROPOFOL 200 MCG/KG/MIN: 10 INJECTION, EMULSION INTRAVENOUS at 08:03

## 2025-03-17 NOTE — TRANSFER OF CARE
"Anesthesia Transfer of Care Note    Patient: Teresa Trujillo    Procedure(s) Performed: Procedure(s) (LRB):  ULTRASOUND, UPPER GI TRACT, ENDOSCOPIC (N/A)    Patient location: PACU    Anesthesia Type: general    Transport from OR: Transported from OR on 2-3 L/min O2 by NC with adequate spontaneous ventilation    Post pain: adequate analgesia    Post assessment: no apparent anesthetic complications    Post vital signs: stable    Level of consciousness: sedated    Nausea/Vomiting: no nausea/vomiting    Complications: none    Transfer of care protocol was followed      Last vitals: Visit Vitals  /77 (BP Location: Left arm, Patient Position: Lying)   Pulse 70   Temp 36.5 °C (97.7 °F) (Temporal)   Resp 16   Ht 5' 9" (1.753 m)   Wt 72.6 kg (160 lb)   SpO2 96%   Breastfeeding No   BMI 23.63 kg/m²     "

## 2025-03-17 NOTE — ANESTHESIA POSTPROCEDURE EVALUATION
Anesthesia Post Evaluation    Patient: Teresa Trujillo    Procedure(s) Performed: Procedure(s) (LRB):  ULTRASOUND, UPPER GI TRACT, ENDOSCOPIC (N/A)    Final Anesthesia Type: general      Patient location during evaluation: [facility]  Patient participation: Yes- Able to Participate  Level of consciousness: awake and alert and oriented  Post-procedure vital signs: reviewed and stable  Pain management: adequate  Airway patency: patent    PONV status at discharge: No PONV  Anesthetic complications: no      Cardiovascular status: blood pressure returned to baseline  Respiratory status: unassisted and spontaneous ventilation  Hydration status: euvolemic  Follow-up not needed.              Vitals Value Taken Time   /69 03/17/25 10:32   Temp 36.7 °C (98 °F) 03/17/25 10:30   Pulse 73 03/17/25 10:34   Resp 13 03/17/25 10:34   SpO2 100 % 03/17/25 10:34   Vitals shown include unfiled device data.      No case tracking events are documented in the log.      Pain/Samantha Score: Samantha Score: 10 (3/17/2025 10:30 AM)

## 2025-03-17 NOTE — H&P
Short Stay Endoscopy History and Physical    PCP - Keith Freeman MD  Referring Physician - Juan Miguel Valenzuela MD  [address]  [address]  [place] [address]    Procedure - eus  ASA - per anesthesia  Mallampati - per anesthesia  History of Anesthesia problems - no  Family history Anesthesia problems -  no   Plan of anesthesia - General    HPI:  This is a 60 y.o. female here for evaluation of: NET    Reflux - no  Dysphagia - no  Abdominal pain - no  Diarrhea - no    ROS:  Constitutional: No fevers, chills, No weight loss  CV: No chest pain  Pulm: No cough, No shortness of breath  Ophtho: No vision changes  GI: see HPI  Derm: No rash    Medical History:  has a past medical history of Diabetes mellitus, type 2, HTN (hypertension), Hyperlipidemia, and Migraine.    Surgical History:  has a past surgical history that includes  section; Liver biopsy; Laparoscopic cholecystectomy (N/A, 2019); Cholecystectomy; Diagnostic laparoscopy (Right, 2020); and Endoscopic ultrasonography, GI.    Family History: family history includes Cancer in her brother; Diabetes in her maternal grandmother; Heart disease in her sister; Thyroid disease in her mother..    Social History:  reports that she has never smoked. She has never used smokeless tobacco. She reports current alcohol use. She reports that she does not use drugs.    Review of patient's allergies indicates:   Allergen Reactions    Lidocaine Swelling     Lidocaine 2%    Atorvastatin Other (See Comments)     Elevated liver enzymes level       Medications:   Prescriptions Prior to Admission[1]    Physical Exam:    Vital Signs:   Vitals:    25 0749   BP: 130/77   Pulse: 70   Resp: 16   Temp: 97.7 °F (36.5 °C)       General Appearance: Well appearing in no acute distress    Labs:  Lab Results   Component Value Date    WBC 4.24 03/10/2025    HGB 12.7 03/10/2025    HCT 39.6 03/10/2025     03/10/2025    CHOL 174 2024    TRIG 90  07/19/2024    HDL 60 07/19/2024    ALT 17 03/10/2025    AST 30 03/10/2025     03/10/2025    K 4.0 03/10/2025     03/10/2025    CREATININE 0.7 03/10/2025    BUN 10 03/10/2025    CO2 28 03/10/2025    TSH 0.674 03/07/2022    HGBA1C 9.2 (H) 12/04/2024    HGBA1C 9.2 (H) 12/04/2024       I have explained the risks and benefits of this endoscopic procedure to the patient including but not limited to bleeding, inflammation, infection, perforation, and death.      Juan Miguel Valenzuela MD         [1]   Medications Prior to Admission   Medication Sig Dispense Refill Last Dose/Taking    lisinopriL (PRINIVIL,ZESTRIL) 20 MG tablet Take 1 tablet (20 mg total) by mouth once daily. 90 tablet 3 3/16/2025    metFORMIN (GLUCOPHAGE-XR) 500 MG ER 24hr tablet Take 1 tablet (500 mg total) by mouth 2 (two) times daily with meals. 180 tablet 3 3/16/2025    venlafaxine (EFFEXOR-XR) 150 MG Cp24 Take 1 capsule (150 mg total) by mouth once daily. 90 capsule 3 3/16/2025    blood sugar diagnostic (ONETOUCH VERIO TEST STRIPS) Strp TEST ONCE DAILY 100 strip 3     estradiol-norethindrone acet 0.5-0.1 mg per tablet Take 1 tablet by mouth.       lancets (ONETOUCH DELICA PLUS LANCET) 33 gauge Misc TEST DAILY 100 each 11     levocetirizine (XYZAL) 5 MG tablet Take 1 tablet (5 mg total) by mouth every evening. 90 tablet 3

## 2025-03-17 NOTE — PLAN OF CARE
Patient and patient's spouse received discharge instructions and prescriptions.  Patient and patient's spouse verbalized understanding of all instructions given and all questions were addressed prior to patient's discharge.  Patient's vital signs are stable and within patient's baseline.  Patient tolerated clear liquids PO.  Patient denies pain.  Patient denies nausea and vomiting at this time.  Patient meets all criteria for discharge at this time.

## 2025-03-17 NOTE — PROVATION PATIENT INSTRUCTIONS
Discharge Summary/Instructions after an Endoscopic Procedure  Patient Name: Teresa Trujillo  Patient MRN: [id number]  Patient YOB: 1965  Monday, March 17, 2025  Juan Miguel Valenzuela MD  Dear patient,  As a result of recent federal legislation (The Federal Cures Act), you may   receive lab or pathology results from your procedure in your [facility]   account before your physician is able to contact you. Your physician or   their representative will relay the results to you with their   recommendations at their soonest availability.  Thank you,  RESTRICTIONS:  During your procedure today, you received medications for sedation.  These   medications may affect your judgment, balance and coordination.  Therefore,   for 24 hours, you have the following restrictions:   - DO NOT drive a car, operate machinery, make legal/financial decisions,   sign important papers or drink alcohol.    ACTIVITY:  Today: no heavy lifting, straining or running due to procedural   sedation/anesthesia.  The following day: return to full activity including work.  DIET:  Eat and drink normally unless instructed otherwise.     TREATMENT FOR COMMON SIDE EFFECTS:  - Mild abdominal pain, nausea, belching, bloating or excessive gas:  rest,   eat lightly and use a heating pad.  - Sore Throat: treat with throat lozenges and/or gargle with warm salt   water.  - Because air was used during the procedure, expelling large amounts of air   from your rectum or belching is normal.  - If a bowel prep was taken, you may not have a bowel movement for 1-3 days.    This is normal.  SYMPTOMS TO WATCH FOR AND REPORT TO YOUR PHYSICIAN:  1. Abdominal pain or bloating, other than gas cramps.  2. Chest pain.  3. Back pain.  4. Signs of infection such as: chills or fever occurring within 24 hours   after the procedure.  5. Rectal bleeding, which would show as bright red, maroon, or black stools.   (A tablespoon of blood from the rectum is not serious, especially if    hemorrhoids are present.)  6. Vomiting.  7. Weakness or dizziness.  GO DIRECTLY TO THE NEAREST EMERGENCY ROOM IF YOU HAVE ANY OF THE FOLLOWING:      Difficulty breathing              Chills and/or fever over 101 F   Persistent vomiting and/or vomiting blood   Severe abdominal pain   Severe chest pain   Black, tarry stools   Bleeding- more than one tablespoon   Any other symptom or condition that you feel may need urgent attention  Your doctor recommends these additional instructions:  If any biopsies were taken, your doctors clinic will contact you in 1 to 2   weeks with any results.  - Discharge patient to home.   - Resume previous diet.   - Continue present medications.   - Return to referring physician as previously scheduled.  For questions, problems or results please call your physician - Juan Miguel Valenzuela MD at Work:  [phone].  [facility], EMERGENCY ROOM PHONE NUMBER: [phone]  IF A COMPLICATION OR EMERGENCY SITUATION ARISES AND YOU ARE UNABLE TO REACH   YOUR PHYSICIAN - GO DIRECTLY TO THE EMERGENCY ROOM.  Juan Miguel Valenzuela MD  3/17/2025 9:07:47 AM  This report has been verified and signed electronically.  Dear patient,  As a result of recent federal legislation (The Federal Cures Act), you may   receive lab or pathology results from your procedure in your [facility]   account before your physician is able to contact you. Your physician or   their representative will relay the results to you with their   recommendations at their soonest availability.  Thank you,  [employer]ATION

## 2025-03-17 NOTE — ANESTHESIA PREPROCEDURE EVALUATION
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 female with PMH DM2, HTN, migraines, duodenal NET presenting for above procedure(s).    Past Medical History:   Diagnosis Date    Diabetes mellitus, type 2     HTN (hypertension)     Hyperlipidemia     Migraine       Hospital LOS: 0 days  ICU LOS: Patient does not have an ICU stay during this admission.    ALLERGIES:     Review of patient's allergies indicates:   Allergen Reactions    Atorvastatin Other (See Comments)     Elevated liver enzymes level     LDA:     Lines/Drains/Airways       None                  Anesthesia Evaluation     Patient summary reviewed    No history of anesthetic complications   Airway   Mallampati: II  TM distance: Normal  Neck ROM: Normal ROM  Dental    (+) Intact    Pulmonary    (-) COPD, asthma, shortness of breath  Cardiovascular   (+) hypertension  (-) past MI, CAD, CABG/stent, angina    Neuro/Psych    (+) neuromuscular disease, headaches, psychiatric history    GI/Hepatic/Renal    (+) GERD well controlled, liver disease    Endo/Other    (+) diabetes mellitus (uncontrolled, HbA1c 9.2) type 2  Abdominal                    MEDICATIONS:     Current Outpatient Medications on File Prior to Encounter   Medication Sig Dispense Refill Last Dose/Taking    blood sugar diagnostic (ONETOUCH  VERIO TEST STRIPS) Strp TEST ONCE DAILY 100 strip 3     estradiol-norethindrone acet 0.5-0.1 mg per tablet Take 1 tablet by mouth.       lancets (ONETOUCH DELICA PLUS LANCET) 33 gauge Misc TEST DAILY 100 each 11     levocetirizine (XYZAL) 5 MG tablet Take 1 tablet (5 mg total) by mouth every evening. 90 tablet 3     lisinopriL (PRINIVIL,ZESTRIL) 20 MG tablet Take 1 tablet (20 mg total) by mouth once daily. 90 tablet 3     metFORMIN (GLUCOPHAGE-XR) 500 MG ER 24hr tablet Take 1 tablet (500 mg total) by mouth 2 (two) times daily with meals. 180 tablet 3     venlafaxine (EFFEXOR-XR) 150 MG Cp24 Take 1 capsule (150 mg total) by mouth once daily. 90 capsule 3       Inpatient Medications:  Antibiotics (From admission, onward)      None          VTE Risk Mitigation (From admission, onward)      None              Current Medications[1]       History:   There are no hospital problems to display for this patient.    Surgical History:    has a past surgical history that includes  section; Liver biopsy; Laparoscopic cholecystectomy (N/A, 2019); Cholecystectomy; and Diagnostic laparoscopy (Right, 2020).   Social History:    has no history on file for sexual activity.  reports that she has never smoked. She has never used smokeless tobacco. She reports current alcohol use. She reports that she does not use drugs.    There were no vitals filed for this visit.  Vital Signs Range (Last 24H):       There is no height or weight on file to calculate BMI.  Wt Readings from Last 4 Encounters:   25 72.8 kg (160 lb 7.9 oz)   24 73.9 kg (163 lb)   24 74 kg (163 lb 2.3 oz)   24 74.9 kg (165 lb 2 oz)        Intake/Output - Last 3 Shifts       None          Lab Results   Component Value Date    WBC 4.24 03/10/2025    HGB 12.7 03/10/2025    HCT 39.6 03/10/2025     03/10/2025     03/10/2025    K 4.0 03/10/2025     03/10/2025    CREATININE 0.7 03/10/2025    BUN 10 03/10/2025    CO2 28  03/10/2025     (H) 03/10/2025    CALCIUM 9.7 03/10/2025    MG 1.9 01/07/2020    PHOS 2.2 (L) 01/07/2020    ALKPHOS 103 03/10/2025    ALT 17 03/10/2025    AST 30 03/10/2025    ALBUMIN 4.2 03/10/2025    HGBA1C 9.2 (H) 12/04/2024    HGBA1C 9.2 (H) 12/04/2024    5HIAAPLASMA 8 03/10/2025     No results found for this or any previous visit (from the past 12 hours).  Recent Labs   Lab 03/10/25  1109   WBC 4.24   HGB 12.7   HCT 39.6         K 4.0   CREATININE 0.7   *   5HIAAPLASMA 8     No LMP recorded.    EKG:   No results found for this or any previous visit.  TTE:  No results found for this or any previous visit.    BRANDY:  No results found for this or any previous visit.    Stress Test:  No results found for this or any previous visit.    No results found for this or any previous visit.    LHC:  No results found for this or any previous visit.    Cardiac Device Check   No results found for this or any previous visit.    No results found for this or any previous visit.      Pre-op Assessment    I have reviewed the Patient Summary Reports.    I have reviewed the NPO Status.      Review of Systems  Anesthesia Hx:   Denies Hx of Anesthetic complications              Denies Personal Hx of Anesthesia complications.                    Social:  Non-Smoker       Hematology/Oncology:                      Current/Recent Cancer.            Oncology Comments: Duodenal NET     Cardiovascular:     Hypertension   Denies MI.  Denies CAD.    Denies CABG/stent.    Denies Angina.                                    Pulmonary:    Denies COPD.  Denies Asthma.   Denies Shortness of breath.                  Hepatic/GI:     GERD, well controlled Liver Disease,   Taking GLP-1 Agonists Instructed to Hold for 7 Days No Reported GI Symptoms          Neurological:    Neuromuscular Disease,  Headaches                                 Endocrine:  Diabetes (uncontrolled, HbA1c 9.2), type 2           Psych:  Psychiatric History                   Physical Exam  General: Well nourished, Cooperative, Alert and Oriented    Airway:  Mallampati: II   Mouth Opening: Normal  TM Distance: Normal  Tongue: Normal  Neck ROM: Normal ROM    Dental:  Intact        Anesthesia Plan  Type of Anesthesia, risks & benefits discussed:    Anesthesia Type: Gen Natural Airway  Intra-op Monitoring Plan: Standard ASA Monitors  Post Op Pain Control Plan: multimodal analgesia  Induction:  IV  Informed Consent: Informed consent signed with the Patient and all parties understand the risks and agree with anesthesia plan.  All questions answered.   ASA Score: 3  Day of Surgery Review of History & Physical: H&P Update referred to the surgeon/provider.    Ready For Surgery From Anesthesia Perspective.     .           [1]   No current facility-administered medications for this encounter.     Current Outpatient Medications   Medication Sig Dispense Refill    blood sugar diagnostic (ONETOUCH VERIO TEST STRIPS) Strp TEST ONCE DAILY 100 strip 3    estradiol-norethindrone acet 0.5-0.1 mg per tablet Take 1 tablet by mouth.      lancets (ONETOUCH DELICA PLUS LANCET) 33 gauge Misc TEST DAILY 100 each 11    levocetirizine (XYZAL) 5 MG tablet Take 1 tablet (5 mg total) by mouth every evening. 90 tablet 3    lisinopriL (PRINIVIL,ZESTRIL) 20 MG tablet Take 1 tablet (20 mg total) by mouth once daily. 90 tablet 3    metFORMIN (GLUCOPHAGE-XR) 500 MG ER 24hr tablet Take 1 tablet (500 mg total) by mouth 2 (two) times daily with meals. 180 tablet 3    venlafaxine (EFFEXOR-XR) 150 MG Cp24 Take 1 capsule (150 mg total) by mouth once daily. 90 capsule 3     Facility-Administered Medications Ordered in Other Encounters   Medication Dose Route Frequency Provider Last Rate Last Admin    0.9%  NaCl infusion   Intravenous Continuous Juan Miguel Quinn Jr., MD        lidocaine (PF) 10 mg/ml (1%) injection 10 mg  1 mL Intradermal Once Juan Miguel Quinn Jr., MD

## 2025-03-24 ENCOUNTER — PATIENT MESSAGE (OUTPATIENT)
Dept: INTERNAL MEDICINE | Facility: CLINIC | Age: 60
End: 2025-03-24
Payer: COMMERCIAL

## 2025-03-25 ENCOUNTER — LAB VISIT (OUTPATIENT)
Dept: LAB | Facility: HOSPITAL | Age: 60
End: 2025-03-25
Payer: COMMERCIAL

## 2025-03-25 DIAGNOSIS — E11.9 TYPE 2 DIABETES MELLITUS WITHOUT COMPLICATION, WITHOUT LONG-TERM CURRENT USE OF INSULIN: ICD-10-CM

## 2025-03-25 PROCEDURE — 36415 COLL VENOUS BLD VENIPUNCTURE: CPT

## 2025-03-25 PROCEDURE — 83036 HEMOGLOBIN GLYCOSYLATED A1C: CPT

## 2025-03-26 ENCOUNTER — PATIENT MESSAGE (OUTPATIENT)
Dept: INTERNAL MEDICINE | Facility: CLINIC | Age: 60
End: 2025-03-26
Payer: COMMERCIAL

## 2025-03-26 LAB
EAG (OHS): 157 MG/DL (ref 68–131)
HBA1C MFR BLD: 7.1 % (ref 4–5.6)

## 2025-03-26 NOTE — PROGRESS NOTES
CHIEF COMPLAINT: Type 2 Diabetes     HPI: Mrs. Teresa Trujillo is a 60 y.o. female who was diagnosed with Type 2 DM , new onset 7/2024.   A1c improved 9.2% to 7.1%   Lab Results   Component Value Date    HGBA1C 7.1 (H) 03/25/2025   Dealing with hot flashes, menopause  Being seen by me for the second time.   Seen by Dr. BRITTANY Asencio.     Loss 8# since last visit  Highest 170 mg/dl   Bg 135 mg/dl  Lowest: 120s mg/dl    Dietary habits:  Watching portion  Needs more veggies  Loves popcorn from [employer]   Less eater, picky eater  16 oz of coke for 2 days  4 bottles of water/day   Snacks: chips, pickle    Fruits: plums, grapes , nectarines, banana    Exercise: intermittent , walking     PREVIOUS DIABETES MEDICATIONS TRIED  Metformin xr    CURRENT DIABETIC MEDS: metformin xr 500 mg bid    Makes frequent changes to his/her insulin regimen on the basis of blood glucose data    Patient is willing and able to use the device  Demonstrated an understanding of the technology and is motivated to use CGM  Patient expected to adhere to a comprehensive diabetes treatment plan and patient has adequate medical supervision    Has multiple impaired awareness of hypoglycemia (hypoglycemia unawareness)      Diabetes Management Status    Statin: Taking  ACE/ARB: Taking    Screening or Prevention Patient's value Goal Complete/Controlled?   HgA1C Testing and Control   Lab Results   Component Value Date    HGBA1C 7.1 (H) 03/25/2025      Annually/Less than 8% No   Lipid profile : 07/19/2024 Annually Yes   LDL control Lab Results   Component Value Date    LDLCALC 96.0 07/19/2024    Annually/Less than 100 mg/dl  Yes   Nephropathy screening Lab Results   Component Value Date    LABMICR 29.0 08/06/2024     Lab Results   Component Value Date    PROTEINUA Negative 05/29/2023    Annually Yes   Blood pressure BP Readings from Last 1 Encounters:   03/17/25 126/69    Less than 140/90 Yes   Dilated retinal exam : 03/17/2022 Annually Yes   Foot exam   :  "11/12/2024 Annually No     REVIEW OF SYSTEMS  General: no weakness, fatigue, + weight changes (loss) 8#  Eyes: no double or blurred vision, eye pain, or redness  Cardiovascular: no chest pain, palpitations, edema, or murmurs.   Respiratory: no cough or dyspnea.   GI: no heartburn, nausea, or changes in bowel patterns; good appetite.   Skin: no rashes, dryness, itching, or reactions at insulin injection sites.  Neuro: no numbness, tingling, tremors, or vertigo.   Endocrine: no polyuria, polydipsia, polyphagia, heat or cold intolerance.     Vital Signs  /68 (BP Location: Left arm, Patient Position: Sitting)   Pulse 101   Ht 5' 9" (1.753 m)   Wt 71.4 kg (157 lb 6.5 oz)   SpO2 95%   BMI 23.25 kg/m²     Hemoglobin A1C   Date Value Ref Range Status   12/04/2024 9.2 (H) 4.0 - 5.6 % Final     Comment:     ADA Screening Guidelines:  5.7-6.4%  Consistent with prediabetes  >or=6.5%  Consistent with diabetes    High levels of fetal hemoglobin interfere with the HbA1C  assay. Heterozygous hemoglobin variants (HbS, HgC, etc)do  not significantly interfere with this assay.   However, presence of multiple variants may affect accuracy.     12/04/2024 9.2 (H) 4.0 - 5.6 % Final     Comment:     ADA Screening Guidelines:  5.7-6.4%  Consistent with prediabetes  >or=6.5%  Consistent with diabetes    High levels of fetal hemoglobin interfere with the HbA1C  assay. Heterozygous hemoglobin variants (HbS, HgC, etc)do  not significantly interfere with this assay.   However, presence of multiple variants may affect accuracy.     07/19/2024 8.8 (H) 4.0 - 5.6 % Final     Comment:     ADA Screening Guidelines:  5.7-6.4%  Consistent with prediabetes  >or=6.5%  Consistent with diabetes    High levels of fetal hemoglobin interfere with the HbA1C  assay. Heterozygous hemoglobin variants (HbS, HgC, etc)do  not significantly interfere with this assay.   However, presence of multiple variants may affect accuracy.       Hemoglobin A1c   Date " Value Ref Range Status   03/25/2025 7.1 (H) 4.0 - 5.6 % Final     Comment:     ADA Screening Guidelines:  5.7-6.4%  Consistent with prediabetes  >=6.5%  Consistent with diabetes    High levels of fetal hemoglobin interfere with the HbA1C  assay. Heterozygous hemoglobin variants (HbS, HgC, etc)do  not significantly interfere with this assay.   However, presence of multiple variants may affect accuracy.        Chemistry        Component Value Date/Time     03/10/2025 1109    K 4.0 03/10/2025 1109     03/10/2025 1109    CO2 28 03/10/2025 1109    BUN 10 03/10/2025 1109    CREATININE 0.7 03/10/2025 1109     (H) 03/10/2025 1109        Component Value Date/Time    CALCIUM 9.7 03/10/2025 1109    ALKPHOS 103 03/10/2025 1109    AST 30 03/10/2025 1109    ALT 17 03/10/2025 1109    BILITOT 0.7 03/10/2025 1109    ESTGFRAFRICA >60 03/07/2022 1201    EGFRNONAA >60 03/07/2022 1201           Lab Results   Component Value Date    TSH 0.674 03/07/2022      Lab Results   Component Value Date    CHOL 174 07/19/2024    CHOL 207 (H) 03/08/2024    CHOL 236 (H) 05/29/2023     Lab Results   Component Value Date    HDL 60 07/19/2024    HDL 76 (H) 03/08/2024    HDL 77 (H) 05/29/2023     Lab Results   Component Value Date    LDLCALC 96.0 07/19/2024    LDLCALC 113 03/08/2024    LDLCALC 133.8 05/29/2023     Lab Results   Component Value Date    TRIG 90 07/19/2024    TRIG 92 03/08/2024    TRIG 126 05/29/2023     Lab Results   Component Value Date    CHOLHDL 34.5 07/19/2024    CHOLHDL 2.72 03/08/2024    CHOLHDL 32.6 05/29/2023         PHYSICAL EXAMINATION  Constitutional: Appears well, no distress Reviewed vitals above.  Eyes: conjunctivae & lids intact; PERRLA, EOMs intact; optic discs   Neck: Supple, trachea midline.   Respiratory: No wheezes, even and unlabored  Cardiovascular: RRR;  no edema or varicosities  Lymph: no lymphadenopathy palpated  Skin: warm and dry; no injection site reactions, no acanthosis nigracans  observed.  Neuro:patient alert and cooperative, normal affect; steady gait.  Psychiatric: judgement & insight intact, orientation of time, place & person intact, memory; mood & affect wnl     Diabetes Foot Exam:   Deferred    Assessment/Plan    1. Type 2 diabetes mellitus with hyperglycemia, without long-term current use of insulin  Hemoglobin A1C  A1c goal less than 7%  Increase metformin in the evening, 1000 mg ,   Continue a.m. 500 mg  F/u in 4 months       2. Stress and adjustment reaction  On ssri  Stable       3. Mixed hyperlipidemia  Lab Results   Component Value Date    LDLCALC 96.0 07/19/2024     At goal   On statin       4. Essential hypertension  Bp controlled  On arb/acei       5. Diabetic polyneuropathy associated with type 2 diabetes mellitus  F/u with podiatry prn  Stable         6. Generalized anxiety disorder  See above   On ssri   Stable       7. Type 2 diabetes mellitus without complication, without long-term current use of insulin  metFORMIN (GLUCOPHAGE-XR) 500 MG ER 24hr tablet, rx sent  Doing very well , near goal

## 2025-03-27 ENCOUNTER — OFFICE VISIT (OUTPATIENT)
Dept: INTERNAL MEDICINE | Facility: CLINIC | Age: 60
End: 2025-03-27
Payer: COMMERCIAL

## 2025-03-27 VITALS
HEART RATE: 101 BPM | HEIGHT: 69 IN | BODY MASS INDEX: 23.32 KG/M2 | WEIGHT: 157.44 LBS | DIASTOLIC BLOOD PRESSURE: 68 MMHG | SYSTOLIC BLOOD PRESSURE: 106 MMHG | OXYGEN SATURATION: 95 %

## 2025-03-27 DIAGNOSIS — E11.42 DIABETIC POLYNEUROPATHY ASSOCIATED WITH TYPE 2 DIABETES MELLITUS: ICD-10-CM

## 2025-03-27 DIAGNOSIS — I10 ESSENTIAL HYPERTENSION: ICD-10-CM

## 2025-03-27 DIAGNOSIS — E11.65 TYPE 2 DIABETES MELLITUS WITH HYPERGLYCEMIA, WITHOUT LONG-TERM CURRENT USE OF INSULIN: Primary | ICD-10-CM

## 2025-03-27 DIAGNOSIS — F43.29 STRESS AND ADJUSTMENT REACTION: ICD-10-CM

## 2025-03-27 DIAGNOSIS — E11.9 TYPE 2 DIABETES MELLITUS WITHOUT COMPLICATION, WITHOUT LONG-TERM CURRENT USE OF INSULIN: ICD-10-CM

## 2025-03-27 DIAGNOSIS — F41.1 GENERALIZED ANXIETY DISORDER: ICD-10-CM

## 2025-03-27 DIAGNOSIS — E78.2 MIXED HYPERLIPIDEMIA: ICD-10-CM

## 2025-03-27 PROCEDURE — 3051F HG A1C>EQUAL 7.0%<8.0%: CPT | Mod: CPTII,S$GLB,, | Performed by: NURSE PRACTITIONER

## 2025-03-27 PROCEDURE — 4010F ACE/ARB THERAPY RXD/TAKEN: CPT | Mod: CPTII,S$GLB,, | Performed by: NURSE PRACTITIONER

## 2025-03-27 PROCEDURE — 1159F MED LIST DOCD IN RCRD: CPT | Mod: CPTII,S$GLB,, | Performed by: NURSE PRACTITIONER

## 2025-03-27 PROCEDURE — 3078F DIAST BP <80 MM HG: CPT | Mod: CPTII,S$GLB,, | Performed by: NURSE PRACTITIONER

## 2025-03-27 PROCEDURE — 3074F SYST BP LT 130 MM HG: CPT | Mod: CPTII,S$GLB,, | Performed by: NURSE PRACTITIONER

## 2025-03-27 PROCEDURE — 1160F RVW MEDS BY RX/DR IN RCRD: CPT | Mod: CPTII,S$GLB,, | Performed by: NURSE PRACTITIONER

## 2025-03-27 PROCEDURE — 3008F BODY MASS INDEX DOCD: CPT | Mod: CPTII,S$GLB,, | Performed by: NURSE PRACTITIONER

## 2025-03-27 PROCEDURE — 99214 OFFICE O/P EST MOD 30 MIN: CPT | Mod: S$GLB,,, | Performed by: NURSE PRACTITIONER

## 2025-03-27 PROCEDURE — 99999 PR PBB SHADOW E&M-EST. PATIENT-LVL IV: CPT | Mod: PBBFAC,,, | Performed by: NURSE PRACTITIONER

## 2025-03-27 RX ORDER — METFORMIN HYDROCHLORIDE 500 MG/1
TABLET, EXTENDED RELEASE ORAL
Qty: 270 TABLET | Refills: 3 | Status: SHIPPED | OUTPATIENT
Start: 2025-03-27

## 2025-03-27 NOTE — PATIENT INSTRUCTIONS
Metformin 500 mg in am, 1000 mg in the evening    Lab Results   Component Value Date    HGBA1C 7.1 (H) 03/25/2025     Goal less than 7%     [email or web address]  Eat fit carie  [employer] Database[employer] carie  [email or web address]    mySugr carie       Goal  no higher than 180    Follow up in 4 months w/Irielle  A1c prior -4 months

## 2025-03-31 ENCOUNTER — OFFICE VISIT (OUTPATIENT)
Dept: HEMATOLOGY/ONCOLOGY | Facility: CLINIC | Age: 60
End: 2025-03-31
Payer: COMMERCIAL

## 2025-03-31 DIAGNOSIS — C7A.010 MALIGNANT CARCINOID TUMOR OF DUODENUM: Primary | ICD-10-CM

## 2025-03-31 DIAGNOSIS — I10 ESSENTIAL HYPERTENSION: ICD-10-CM

## 2025-03-31 DIAGNOSIS — D3A.8 NEUROENDOCRINE TUMOR: ICD-10-CM

## 2025-03-31 PROCEDURE — 1159F MED LIST DOCD IN RCRD: CPT | Mod: CPTII,95,, | Performed by: INTERNAL MEDICINE

## 2025-03-31 PROCEDURE — 4010F ACE/ARB THERAPY RXD/TAKEN: CPT | Mod: CPTII,95,, | Performed by: INTERNAL MEDICINE

## 2025-03-31 PROCEDURE — 98006 SYNCH AUDIO-VIDEO EST MOD 30: CPT | Mod: 95,,, | Performed by: INTERNAL MEDICINE

## 2025-03-31 PROCEDURE — G2211 COMPLEX E/M VISIT ADD ON: HCPCS | Mod: 95,,, | Performed by: INTERNAL MEDICINE

## 2025-03-31 PROCEDURE — 3051F HG A1C>EQUAL 7.0%<8.0%: CPT | Mod: CPTII,95,, | Performed by: INTERNAL MEDICINE

## 2025-03-31 PROCEDURE — 1160F RVW MEDS BY RX/DR IN RCRD: CPT | Mod: CPTII,95,, | Performed by: INTERNAL MEDICINE

## 2025-03-31 NOTE — PROGRESS NOTES
The patient location is: home in [place]  The chief complaint leading to consultation is: follow up for duodenal NET    Visit type: audiovisual    Face to Face time with patient: 10 min  30 minutes of total time spent on the encounter, which includes face to face time and non-face to face time preparing to see the patient (eg, review of tests), Obtaining and/or reviewing separately obtained history, Documenting clinical information in the electronic or other health record, Independently interpreting results (not separately reported) and communicating results to the patient/family/caregiver, or Care coordination (not separately reported).         Each patient to whom he or she provides medical services by telemedicine is:  (1) informed of the relationship between the physician and patient and the respective role of any other health care provider with respect to management of the patient; and (2) notified that he or she may decline to receive medical services by telemedicine and may withdraw from such care at any time.    Notes:                                                              PROGRESS NOTE    Subjective:       Patient ID: Teresa Trujillo is a 60 y.o. female.    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 Ms Trujillo is a 58 yo woman with T2DM, HTN, HLD who first saw me on 9/4/2024 for further management of duodenal NET. She had an EGD on 8/4/2022 that noted a duodenal lesion. Pathology showed a well diff grade 1 NET. Mitotic rate <1 mitosis per 2 mm2. Ki 67 1%. She had yearly surveillance scope. Last one was in March 2024 and negative.   CT C/A/P 8/28/24:  1. No convincing local recurrence or metastatic disease.  2. 2 mm left lung nodule.  For a solid nodule <6 mm, Fleischner Society 2017 guidelines recommend no routine follow up for a low risk patient, or follow-up with non-contrast chest CT at 12 months in a  high risk patient.  3. Hepatomegaly slightly increased compared to prior.  4. Additional findings as above.  Discussed surveillance.     Interval History:   Ms Trujillo returns for follow up. Feeling well    ECO    ROS:   A ten-point system review is obtained and negative except for what was stated in the Interval History.     Physical Examination:   General: well hydrated, well developed, in no acute distress  HEENT: normocephalic, EOMI, anicteric sclerae  Neuro: alert and oriented x 4  Psych: pleasant and appropriate mood and affect    Objective:     Laboratory Data:  Labs reviewed. 5-HIAA, serotonin, pancreastatin levels are normal    Imaging Data:  CT CAP 24:  Impression:     1. No convincing local recurrence or metastatic disease.  2. 2 mm left lung nodule.  For a solid nodule <6 mm, Fleischner Society 2017 guidelines recommend no routine follow up for a low risk patient, or follow-up with non-contrast chest CT at 12 months in a high risk patient.  3. Hepatomegaly slightly increased compared to prior.  4. Additional findings as above.    Assessment and Plan:     1. Malignant carcinoid tumor of duodenum    2. Neuroendocrine tumor    3. 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 She has undergone yearly surveillance scope. Most recent one in 2025 and was negative.   - reviewed test results with patient. BONNIE on upper EUS in 2025  - biomarkers normal  - c/w surveillance. RTC in one year with biomarker, CT C/A/P, upper EUS  - 2 mm lung nodule on CT 2025. Recc no f/u for low risk patient. Asked patient. She never smoked    3.  - monitor BP    Follow-up:     RTC in one year  Knows to call in the interval if any problems arise.    Electronically signed by Jen Lipscomb for Scheduling    Med Onc Chart Routing      Follow up with physician 1 year. see me  in one year with fasting CBC, CMP, serotonin, pancreastatin, 5-HIAA done 3 weeks prior, CT C/A/P done 1 week prior   Follow up with [facility]    Infusion scheduling note    Injection scheduling note    Labs CBC and CMP   Scheduling:  Preferred lab:  Lab interval:  serotonin, pancreastatin, 5-HIAA   Imaging CT chest abdomen pelvis      Pharmacy appointment    Other referrals

## 2025-04-17 ENCOUNTER — PATIENT MESSAGE (OUTPATIENT)
Dept: INTERNAL MEDICINE | Facility: CLINIC | Age: 60
End: 2025-04-17
Payer: COMMERCIAL

## 2025-04-17 DIAGNOSIS — E11.65 TYPE 2 DIABETES MELLITUS WITH HYPERGLYCEMIA, WITHOUT LONG-TERM CURRENT USE OF INSULIN: Primary | ICD-10-CM

## 2025-04-19 NOTE — TELEPHONE ENCOUNTER
No care due was identified.  [facility] Embedded Care Due Messages. Reference number: [id number].   4/19/2025 9:38:47 AM CDT

## 2025-04-19 NOTE — TELEPHONE ENCOUNTER
Pt requesting med refill of Ozempic at increased dose 0.5mg. Pended for your review. Pt denies side effects     LOV with Keith Freeman MD , 12/18/2024

## 2025-04-24 DIAGNOSIS — E11.65 TYPE 2 DIABETES MELLITUS WITH HYPERGLYCEMIA, WITHOUT LONG-TERM CURRENT USE OF INSULIN: ICD-10-CM

## 2025-04-24 NOTE — TELEPHONE ENCOUNTER
No care due was identified.  [facility] Embedded Care Due Messages. Reference number: [id number].   4/24/2025 12:47:24 PM CDT

## 2025-05-09 ENCOUNTER — PATIENT MESSAGE (OUTPATIENT)
Dept: INTERNAL MEDICINE | Facility: CLINIC | Age: 60
End: 2025-05-09
Payer: COMMERCIAL

## 2025-05-09 DIAGNOSIS — E11.9 TYPE 2 DIABETES MELLITUS WITHOUT COMPLICATION, WITHOUT LONG-TERM CURRENT USE OF INSULIN: Primary | ICD-10-CM

## 2025-05-09 RX ORDER — INSULIN PUMP SYRINGE, 3 ML
EACH MISCELLANEOUS
Qty: 1 EACH | Refills: 0 | Status: SHIPPED | OUTPATIENT
Start: 2025-05-09 | End: 2026-05-09

## 2025-05-09 NOTE — TELEPHONE ENCOUNTER
" LOV with Keith Freeman MD , 12/18/2024  Pt requesting rx for "one touch machine."   Order pended, if appropriate.    "

## 2025-05-09 NOTE — TELEPHONE ENCOUNTER
No care due was identified.  [facility] Embedded Care Due Messages. Reference number: [id number].   5/09/2025 4:03:48 PM CDT

## 2025-05-21 DIAGNOSIS — E11.9 TYPE 2 DIABETES MELLITUS WITHOUT COMPLICATION, UNSPECIFIED WHETHER LONG TERM INSULIN USE: ICD-10-CM

## 2025-06-02 ENCOUNTER — PATIENT OUTREACH (OUTPATIENT)
Dept: INTERNAL MEDICINE | Facility: CLINIC | Age: 60
End: 2025-06-02
Payer: COMMERCIAL

## 2025-06-02 DIAGNOSIS — E11.9 TYPE 2 DIABETES MELLITUS WITHOUT COMPLICATION, WITHOUT LONG-TERM CURRENT USE OF INSULIN: Primary | ICD-10-CM

## 2025-06-03 DIAGNOSIS — E11.9 TYPE 2 DIABETES MELLITUS WITHOUT COMPLICATION, WITHOUT LONG-TERM CURRENT USE OF INSULIN: ICD-10-CM

## 2025-06-03 RX ORDER — LANCETS 33 GAUGE
EACH MISCELLANEOUS
Qty: 100 EACH | Refills: 11 | Status: SHIPPED | OUTPATIENT
Start: 2025-06-03

## 2025-06-03 RX ORDER — INSULIN PUMP SYRINGE, 3 ML
EACH MISCELLANEOUS
Qty: 1 EACH | Refills: 0 | Status: SHIPPED | OUTPATIENT
Start: 2025-06-03 | End: 2026-06-03

## 2025-06-17 RX ORDER — LEVOCETIRIZINE DIHYDROCHLORIDE 5 MG/1
5 TABLET, FILM COATED ORAL NIGHTLY
Qty: 90 TABLET | Refills: 1 | Status: SHIPPED | OUTPATIENT
Start: 2025-06-17

## 2025-06-17 NOTE — TELEPHONE ENCOUNTER
No care due was identified.  [facility] Embedded Care Due Messages. Reference number: [id number].   6/17/2025 8:06:17 AM CDT

## 2025-06-17 NOTE — TELEPHONE ENCOUNTER
Refill Decision Note   Teresa Trujillo  is requesting a refill authorization.    Brief Assessment and Rationale for Refill:   Approve       Medication Therapy Plan:         Comments:     Note composed:9:40 AM 06/17/2025

## 2025-06-26 DIAGNOSIS — E11.65 TYPE 2 DIABETES MELLITUS WITH HYPERGLYCEMIA, WITHOUT LONG-TERM CURRENT USE OF INSULIN: ICD-10-CM

## 2025-06-26 NOTE — TELEPHONE ENCOUNTER
Refill Routing Note   Medication(s) are not appropriate for processing by [facility] for the following reason(s):        New or recently adjusted medication    [facility] action(s):  Defer      Medication Therapy Plan: FLOS      Appointments  past 12m or future 3m with PCP    Date Provider   Last Visit   12/18/2024 Keith Freeman MD   Next Visit   Visit date not found Keith Freeman MD   ED visits in past 90 days: 0        Note composed:10:59 AM 06/26/2025

## 2025-06-26 NOTE — TELEPHONE ENCOUNTER
Care Due:                  Date            Visit Type   Department     Provider  --------------------------------------------------------------------------------                                EP -                              PRIMARY      [facility] INTER[facility]  Last Visit: 12-      CARE ([facility])   MEDICINE       Keith Freeman  Next Visit: None Scheduled  None         None Found                                                            Last  Test          Frequency    Reason                     Performed    Due Date  --------------------------------------------------------------------------------    HBA1C.......  6 months...  semaglutide..............  03- 09-    [facility] Embedded Care Due Messages. Reference number: [id number].   6/26/2025 8:58:34 AM CDT

## 2025-06-27 RX ORDER — SEMAGLUTIDE 0.68 MG/ML
0.5 INJECTION, SOLUTION SUBCUTANEOUS WEEKLY
Qty: 9 ML | Refills: 0 | Status: SHIPPED | OUTPATIENT
Start: 2025-06-27

## 2025-07-30 DIAGNOSIS — E11.9 TYPE 2 DIABETES MELLITUS WITHOUT COMPLICATION: ICD-10-CM

## 2025-08-04 ENCOUNTER — LAB VISIT (OUTPATIENT)
Dept: LAB | Facility: HOSPITAL | Age: 60
End: 2025-08-04
Payer: COMMERCIAL

## 2025-08-04 DIAGNOSIS — E11.65 TYPE 2 DIABETES MELLITUS WITH HYPERGLYCEMIA, WITHOUT LONG-TERM CURRENT USE OF INSULIN: ICD-10-CM

## 2025-08-04 LAB
EAG (OHS): 140 MG/DL (ref 68–131)
HBA1C MFR BLD: 6.5 % (ref 4–5.6)

## 2025-08-04 PROCEDURE — 36415 COLL VENOUS BLD VENIPUNCTURE: CPT

## 2025-08-04 PROCEDURE — 83036 HEMOGLOBIN GLYCOSYLATED A1C: CPT

## 2025-08-05 ENCOUNTER — PATIENT MESSAGE (OUTPATIENT)
Dept: INTERNAL MEDICINE | Facility: CLINIC | Age: 60
End: 2025-08-05
Payer: COMMERCIAL

## 2025-08-06 DIAGNOSIS — E11.9 TYPE 2 DIABETES MELLITUS WITHOUT COMPLICATION, WITHOUT LONG-TERM CURRENT USE OF INSULIN: ICD-10-CM

## 2025-08-06 RX ORDER — BLOOD-GLUCOSE METER
EACH MISCELLANEOUS
Qty: 100 STRIP | Refills: 3 | Status: SHIPPED | OUTPATIENT
Start: 2025-08-06

## 2025-08-06 NOTE — TELEPHONE ENCOUNTER
Refill Decision Note   Teresa Ronnie  is requesting a refill authorization.  Brief Assessment and Rationale for Refill:  Approve     Medication Therapy Plan:        Comments:     Note composed:11:59 AM 08/06/2025

## 2025-08-06 NOTE — PROGRESS NOTES
CHIEF COMPLAINT: Type 2 Diabetes     HPI: Mrs. Teresa Trujillo is a 60 y.o. female who was diagnosed with Type 2 DM , new onset 7/2024.   A1c improved 9.2% to 7.1% to 6.5%   Lab Results   Component Value Date    HGBA1C 6.5 (H) 08/04/2025   Dealing with hot flashes, menopause  Seen by Dr. BRITTANY Asencio.   Loss 7# since 11/2024.    Dietary habits:  Watching portion  Needs more veggies  Loves popcorn from [employer]   Less eater, picky eater  16 oz of coke for 2 days  4 bottles of water/day   Snacks: chips, pickle    Fruits: plums, grapes , nectarines, banana    Exercise: intermittent , walking     PREVIOUS DIABETES MEDICATIONS TRIED  Metformin xr  Ozempic     CURRENT DIABETIC MEDS: metformin xr 500 mg bid, ozempic 0.5 mg weekly     Diabetes Management Status    Statin: Taking  ACE/ARB: Taking    Screening or Prevention Patient's value Goal Complete/Controlled?   HgA1C Testing and Control   Lab Results   Component Value Date    HGBA1C 6.5 (H) 08/04/2025      Annually/Less than 8% No   Lipid profile : 07/19/2024 Annually Yes   LDL control Lab Results   Component Value Date    LDLCALC 96.0 07/19/2024    Annually/Less than 100 mg/dl  Yes   Nephropathy screening Lab Results   Component Value Date    LABMICR 29.0 08/06/2024     Lab Results   Component Value Date    PROTEINUA Negative 05/29/2023    Annually Yes   Blood pressure BP Readings from Last 1 Encounters:   03/27/25 106/68    Less than 140/90 Yes   Dilated retinal exam : 03/17/2022 Annually Yes   Foot exam   : 11/12/2024 Annually No     REVIEW OF SYSTEMS  General: no weakness, fatigue, + weight changes (loss) 6-7#  Eyes: no double or blurred vision, eye pain, or redness  Cardiovascular: no chest pain, palpitations, edema, or murmurs.   Respiratory: no cough or dyspnea.   GI: no heartburn, nausea, or changes in bowel patterns; good appetite.   Skin: no rashes, dryness, itching, or reactions at insulin injection sites.  Neuro: no numbness, tingling, tremors, or vertigo.  "  Endocrine: no polyuria, polydipsia, polyphagia, heat or cold intolerance.     Vital Signs  /84 (BP Location: Left arm, Patient Position: Sitting)   Pulse 84   Ht 5' 9" (1.753 m)   Wt 71.2 kg (156 lb 15.5 oz)   SpO2 98%   BMI 23.18 kg/m²     Hemoglobin A1C   Date Value Ref Range Status   12/04/2024 9.2 (H) 4.0 - 5.6 % Final     Comment:     ADA Screening Guidelines:  5.7-6.4%  Consistent with prediabetes  >or=6.5%  Consistent with diabetes    High levels of fetal hemoglobin interfere with the HbA1C  assay. Heterozygous hemoglobin variants (HbS, HgC, etc)do  not significantly interfere with this assay.   However, presence of multiple variants may affect accuracy.     12/04/2024 9.2 (H) 4.0 - 5.6 % Final     Comment:     ADA Screening Guidelines:  5.7-6.4%  Consistent with prediabetes  >or=6.5%  Consistent with diabetes    High levels of fetal hemoglobin interfere with the HbA1C  assay. Heterozygous hemoglobin variants (HbS, HgC, etc)do  not significantly interfere with this assay.   However, presence of multiple variants may affect accuracy.     07/19/2024 8.8 (H) 4.0 - 5.6 % Final     Comment:     ADA Screening Guidelines:  5.7-6.4%  Consistent with prediabetes  >or=6.5%  Consistent with diabetes    High levels of fetal hemoglobin interfere with the HbA1C  assay. Heterozygous hemoglobin variants (HbS, HgC, etc)do  not significantly interfere with this assay.   However, presence of multiple variants may affect accuracy.       Hemoglobin A1c   Date Value Ref Range Status   08/04/2025 6.5 (H) 4.0 - 5.6 % Final     Comment:     ADA Screening Guidelines:  5.7-6.4%  Consistent with prediabetes  >=6.5%  Consistent with diabetes    High levels of fetal hemoglobin interfere with the HbA1C  assay. Heterozygous hemoglobin variants (HbS, HgC, etc)do  not significantly interfere with this assay.   However, presence of multiple variants may affect accuracy.   03/25/2025 7.1 (H) 4.0 - 5.6 % Final     Comment:     ADA " Screening Guidelines:  5.7-6.4%  Consistent with prediabetes  >=6.5%  Consistent with diabetes    High levels of fetal hemoglobin interfere with the HbA1C  assay. Heterozygous hemoglobin variants (HbS, HgC, etc)do  not significantly interfere with this assay.   However, presence of multiple variants may affect accuracy.        Chemistry        Component Value Date/Time     03/10/2025 1109    K 4.0 03/10/2025 1109     03/10/2025 1109    CO2 28 03/10/2025 1109    BUN 10 03/10/2025 1109    CREATININE 0.7 03/10/2025 1109     (H) 03/10/2025 1109        Component Value Date/Time    CALCIUM 9.7 03/10/2025 1109    ALKPHOS 103 03/10/2025 1109    AST 30 03/10/2025 1109    ALT 17 03/10/2025 1109    BILITOT 0.7 03/10/2025 1109    ESTGFRAFRICA >60 03/07/2022 1201    EGFRNONAA >60 03/07/2022 1201           Lab Results   Component Value Date    TSH 0.674 03/07/2022      Lab Results   Component Value Date    CHOL 174 07/19/2024    CHOL 207 (H) 03/08/2024    CHOL 236 (H) 05/29/2023     Lab Results   Component Value Date    HDL 60 07/19/2024    HDL 76 (H) 03/08/2024    HDL 77 (H) 05/29/2023     Lab Results   Component Value Date    LDLCALC 96.0 07/19/2024    LDLCALC 113 03/08/2024    LDLCALC 133.8 05/29/2023     Lab Results   Component Value Date    TRIG 90 07/19/2024    TRIG 92 03/08/2024    TRIG 126 05/29/2023     Lab Results   Component Value Date    CHOLHDL 34.5 07/19/2024    CHOLHDL 2.72 03/08/2024    CHOLHDL 32.6 05/29/2023         PHYSICAL EXAMINATION  Constitutional: Appears well, no distress Reviewed vitals above.  Eyes: conjunctivae & lids intact; PERRLA, EOMs intact; optic discs   Neck: Supple, trachea midline.   Respiratory: No wheezes, even and unlabored  Cardiovascular: RRR;  no edema or varicosities  Lymph: no lymphadenopathy palpated  Skin: warm and dry; no injection site reactions, no acanthosis nigracans observed.  Neuro:patient alert and cooperative, normal affect; steady gait.  Psychiatric:  judgement & insight intact, orientation of time, place & person intact, memory; mood & affect wnl     Diabetes Foot Exam:   Deferred    Assessment/Plan  1. Type 2 diabetes mellitus without complication, without long-term current use of insulin  Diabetic Eye Screening Photo    Hemoglobin A1C    Lipid Panel    A1c goal less than 7%  At goal  Lab Results   Component Value Date    HGBA1C 6.5 (H) 08/04/2025     Continue regimen  Eat fit grocery list given         2. Essential hypertension  Microalbumin/Creatinine Ratio, Urine      3. Major depressive disorder, recurrent, mild  On ssri  Stable   Affects motivation, mood, routine       4. Mixed hyperlipidemia  Failed statin  Last ldl 96   Goal less than 100 for ldl  Encourage pt to eat more whole grains, fiber  Eat more omega-3 via grilled/baked fish  Exercise more, 3-5 x a week at least 150 mins

## 2025-08-06 NOTE — TELEPHONE ENCOUNTER
No care due was identified.  [facility] Embedded Care Due Messages. Reference number: [id number].   8/06/2025 11:50:50 AM CDT

## 2025-08-07 ENCOUNTER — PATIENT MESSAGE (OUTPATIENT)
Dept: INTERNAL MEDICINE | Facility: CLINIC | Age: 60
End: 2025-08-07

## 2025-08-07 ENCOUNTER — OFFICE VISIT (OUTPATIENT)
Dept: INTERNAL MEDICINE | Facility: CLINIC | Age: 60
End: 2025-08-07
Payer: COMMERCIAL

## 2025-08-07 ENCOUNTER — CLINICAL SUPPORT (OUTPATIENT)
Dept: INTERNAL MEDICINE | Facility: CLINIC | Age: 60
End: 2025-08-07
Attending: INTERNAL MEDICINE
Payer: COMMERCIAL

## 2025-08-07 VITALS
DIASTOLIC BLOOD PRESSURE: 84 MMHG | OXYGEN SATURATION: 98 % | WEIGHT: 156.94 LBS | SYSTOLIC BLOOD PRESSURE: 124 MMHG | BODY MASS INDEX: 23.25 KG/M2 | HEIGHT: 69 IN | HEART RATE: 84 BPM

## 2025-08-07 DIAGNOSIS — F33.0 MAJOR DEPRESSIVE DISORDER, RECURRENT, MILD: ICD-10-CM

## 2025-08-07 DIAGNOSIS — E78.2 MIXED HYPERLIPIDEMIA: ICD-10-CM

## 2025-08-07 DIAGNOSIS — E11.65 TYPE 2 DIABETES MELLITUS WITH HYPERGLYCEMIA, WITHOUT LONG-TERM CURRENT USE OF INSULIN: ICD-10-CM

## 2025-08-07 DIAGNOSIS — E11.9 TYPE 2 DIABETES MELLITUS WITHOUT COMPLICATION, WITHOUT LONG-TERM CURRENT USE OF INSULIN: Primary | ICD-10-CM

## 2025-08-07 DIAGNOSIS — E11.9 TYPE 2 DIABETES MELLITUS WITHOUT COMPLICATION, WITHOUT LONG-TERM CURRENT USE OF INSULIN: ICD-10-CM

## 2025-08-07 DIAGNOSIS — I10 ESSENTIAL HYPERTENSION: ICD-10-CM

## 2025-08-07 LAB — HM EYE EXAM: NEGATIVE

## 2025-08-07 PROCEDURE — G2211 COMPLEX E/M VISIT ADD ON: HCPCS | Mod: S$GLB,,, | Performed by: NURSE PRACTITIONER

## 2025-08-07 PROCEDURE — 3079F DIAST BP 80-89 MM HG: CPT | Mod: CPTII,S$GLB,, | Performed by: NURSE PRACTITIONER

## 2025-08-07 PROCEDURE — 3074F SYST BP LT 130 MM HG: CPT | Mod: CPTII,S$GLB,, | Performed by: NURSE PRACTITIONER

## 2025-08-07 PROCEDURE — 92229 IMG RTA DETC/MNTR DS POC ALY: CPT | Mod: S$GLB,,, | Performed by: INTERNAL MEDICINE

## 2025-08-07 PROCEDURE — 99214 OFFICE O/P EST MOD 30 MIN: CPT | Mod: S$GLB,,, | Performed by: NURSE PRACTITIONER

## 2025-08-07 PROCEDURE — 99999 PR PBB SHADOW E&M-EST. PATIENT-LVL IV: CPT | Mod: PBBFAC,,, | Performed by: NURSE PRACTITIONER

## 2025-08-07 PROCEDURE — 4010F ACE/ARB THERAPY RXD/TAKEN: CPT | Mod: CPTII,S$GLB,, | Performed by: NURSE PRACTITIONER

## 2025-08-07 PROCEDURE — 1159F MED LIST DOCD IN RCRD: CPT | Mod: CPTII,S$GLB,, | Performed by: NURSE PRACTITIONER

## 2025-08-07 PROCEDURE — 3008F BODY MASS INDEX DOCD: CPT | Mod: CPTII,S$GLB,, | Performed by: NURSE PRACTITIONER

## 2025-08-07 PROCEDURE — 3044F HG A1C LEVEL LT 7.0%: CPT | Mod: CPTII,S$GLB,, | Performed by: NURSE PRACTITIONER

## 2025-08-07 PROCEDURE — 1160F RVW MEDS BY RX/DR IN RCRD: CPT | Mod: CPTII,S$GLB,, | Performed by: NURSE PRACTITIONER

## 2025-08-07 RX ORDER — SEMAGLUTIDE 0.68 MG/ML
0.5 INJECTION, SOLUTION SUBCUTANEOUS WEEKLY
Qty: 9 ML | Refills: 2 | Status: SHIPPED | OUTPATIENT
Start: 2025-08-07 | End: 2025-08-07

## 2025-08-07 RX ORDER — SEMAGLUTIDE 0.68 MG/ML
0.5 INJECTION, SOLUTION SUBCUTANEOUS WEEKLY
Qty: 9 ML | Refills: 2 | Status: SHIPPED | OUTPATIENT
Start: 2025-08-07

## 2025-08-07 NOTE — PROGRESS NOTES
Approved   12/18/2024  3:10 PM  Appeal supported: No   Note from payer: Request Reference Number: [id number]. OZEMPIC INJ 2MG/3ML is approved through 12/18/2025. Your patient may now fill this prescription and it will be covered.   Payer: [employer] Patient's Payer Case ID: [id number]    [phone]

## 2025-08-07 NOTE — PATIENT INSTRUCTIONS
Ozempic 0.5 mg weekly   Metformin xr 500 mg in am  Metformin xr 1000 mg at night  Lab Results   Component Value Date    HGBA1C 6.5 (H) 08/04/2025     Goal less than 7%    [email or web address]  Eat fit carie  Myfitnesspal carie  [email or web address]   MySugr carie  Glucose guide carie  [email or web address]     Goal  no higher than 180    Follow up in 4 months --December 2025 w/Masood  Labs prior to 11/30/25: A1c lipid, urine mac   Diabetic eye photo 2025

## (undated) DEVICE — ADHESIVE DERMABOND ADVANCED

## (undated) DEVICE — Device

## (undated) DEVICE — POWDER ARISTA AH 3G

## (undated) DEVICE — SUT MCRYL PLUS 4-0 PS2 27IN

## (undated) DEVICE — ELECTRODE REM PLYHSV RETURN 9

## (undated) DEVICE — TRAY MINOR GEN SURG

## (undated) DEVICE — APPLIER CLIP EPIX UNIV 5X34

## (undated) DEVICE — KITTNER ENDOSCOPIC BLNT 5MM

## (undated) DEVICE — SEE MEDLINE ITEM 156925

## (undated) DEVICE — SUT GUT PL. 4-0 27 FS-2

## (undated) DEVICE — SEE MEDLINE ITEM 157117

## (undated) DEVICE — ENDOSTITCH INSTRUMENT

## (undated) DEVICE — DRESSING LEUKOPLAST FLEX 1X3IN

## (undated) DEVICE — BANDAGE ADHESIVE

## (undated) DEVICE — SOL CLEARIFY VISUALIZATION LAP

## (undated) DEVICE — STRIP STERI REIN CLSR 1/2X2IN

## (undated) DEVICE — SUT VICRYL PLUS 3-0 SH 18IN

## (undated) DEVICE — TROCAR ENDOPATH XCEL 5MM 7.5CM

## (undated) DEVICE — TUBING HF INSUFFLATION W/ FLTR

## (undated) DEVICE — NDL HYPO REG 25G X 1 1/2

## (undated) DEVICE — BLADE SURG CARBON STEEL SZ11

## (undated) DEVICE — NDL INSUF ULTRA VERESS 120MM

## (undated) DEVICE — HEMOSTAT SURGICEL 4X8IN

## (undated) DEVICE — KIT WING PAD POSITIONING

## (undated) DEVICE — SYR B-D DISP CONTROL 10CC100/C

## (undated) DEVICE — TROCAR ENDOPATH XCEL 12MM 10CM

## (undated) DEVICE — PENCIL ELECTROSURG HOLST W/BLD

## (undated) DEVICE — WARMER DRAPE STERILE LF

## (undated) DEVICE — SOL 9P NACL IRR PIC IL

## (undated) DEVICE — TROCAR KII FIOS 5MM X 100MM

## (undated) DEVICE — TROCAR KII FIOS 11MM X 100MM

## (undated) DEVICE — SOL NS 1000CC

## (undated) DEVICE — CANNULA ENDOPATH XCEL 5X100MM

## (undated) DEVICE — SUT 0 VICRYL / UR6 (J603)

## (undated) DEVICE — APPLICATOR CHLORAPREP ORN 26ML

## (undated) DEVICE — SUT VICRYL 0 27 CT-2

## (undated) DEVICE — IRRIGATOR ENDOSCOPY DISP.